# Patient Record
Sex: FEMALE | Race: WHITE | NOT HISPANIC OR LATINO | Employment: OTHER | URBAN - METROPOLITAN AREA
[De-identification: names, ages, dates, MRNs, and addresses within clinical notes are randomized per-mention and may not be internally consistent; named-entity substitution may affect disease eponyms.]

---

## 2017-01-24 ENCOUNTER — TRANSCRIBE ORDERS (OUTPATIENT)
Dept: ADMINISTRATIVE | Facility: HOSPITAL | Age: 67
End: 2017-01-24

## 2017-01-24 ENCOUNTER — APPOINTMENT (OUTPATIENT)
Dept: GENERAL RADIOLOGY | Facility: HOSPITAL | Age: 67
End: 2017-01-24
Attending: FAMILY MEDICINE

## 2017-01-24 DIAGNOSIS — M79.622 LEFT UPPER ARM PAIN: Primary | ICD-10-CM

## 2017-05-13 ENCOUNTER — HOSPITAL ENCOUNTER (OUTPATIENT)
Facility: HOSPITAL | Age: 67
Setting detail: OBSERVATION
Discharge: HOME | End: 2017-05-15
Attending: EMERGENCY MEDICINE | Admitting: HOSPITALIST

## 2017-05-13 DIAGNOSIS — K92.2 ACUTE UPPER GI BLEEDING: Primary | ICD-10-CM

## 2017-05-13 PROBLEM — K21.9 GERD (GASTROESOPHAGEAL REFLUX DISEASE): Status: ACTIVE | Noted: 2017-05-13

## 2017-05-13 PROBLEM — E11.9 DIABETES MELLITUS: Status: ACTIVE | Noted: 2017-05-13

## 2017-05-13 PROBLEM — I95.1 ORTHOSTATIC HYPOTENSION: Status: ACTIVE | Noted: 2017-05-13

## 2017-05-13 LAB
ABO GROUP BLD: NORMAL
ABO GROUP BLD: NORMAL
ALBUMIN SERPL-MCNC: 4.8 G/DL (ref 3.2–4.8)
ALBUMIN/GLOB SERPL: 1.7 G/DL (ref 1.5–2.5)
ALP SERPL-CCNC: 75 U/L (ref 25–100)
ALT SERPL W P-5'-P-CCNC: 24 U/L (ref 7–40)
ANION GAP SERPL CALCULATED.3IONS-SCNC: 9 MMOL/L (ref 3–11)
APTT PPP: 25 SECONDS (ref 24–31)
AST SERPL-CCNC: 31 U/L (ref 0–33)
BASOPHILS # BLD AUTO: 0.01 10*3/MM3 (ref 0–0.2)
BASOPHILS NFR BLD AUTO: 0.1 % (ref 0–1)
BILIRUB SERPL-MCNC: 0.6 MG/DL (ref 0.3–1.2)
BLD GP AB SCN SERPL QL: NEGATIVE
BUN BLD-MCNC: 11 MG/DL (ref 9–23)
BUN/CREAT SERPL: 12.2 (ref 7–25)
CALCIUM SPEC-SCNC: 10.6 MG/DL (ref 8.7–10.4)
CHLORIDE SERPL-SCNC: 101 MMOL/L (ref 99–109)
CO2 SERPL-SCNC: 31 MMOL/L (ref 20–31)
CREAT BLD-MCNC: 0.9 MG/DL (ref 0.6–1.3)
D-LACTATE SERPL-SCNC: 1.4 MMOL/L (ref 0.5–2)
DEPRECATED RDW RBC AUTO: 47 FL (ref 37–54)
DEVELOPER EXPIRATION DATE: NORMAL
DEVELOPER LOT NUMBER: NORMAL
EOSINOPHIL # BLD AUTO: 0.19 10*3/MM3 (ref 0.1–0.3)
EOSINOPHIL NFR BLD AUTO: 2.2 % (ref 0–3)
ERYTHROCYTE [DISTWIDTH] IN BLOOD BY AUTOMATED COUNT: 13.7 % (ref 11.3–14.5)
EXPIRATION DATE: NORMAL
FECAL OCCULT BLOOD SCREEN, POC: NEGATIVE
GASTROCULT GAST QL: POSITIVE
GFR SERPL CREATININE-BSD FRML MDRD: 62 ML/MIN/1.73
GLOBULIN UR ELPH-MCNC: 2.9 GM/DL
GLUCOSE BLD-MCNC: 164 MG/DL (ref 70–100)
GLUCOSE BLDC GLUCOMTR-MCNC: 110 MG/DL (ref 70–130)
GLUCOSE BLDC GLUCOMTR-MCNC: 99 MG/DL (ref 70–130)
HCT VFR BLD AUTO: 43.9 % (ref 34.5–44)
HCT VFR BLD AUTO: 44.4 % (ref 34.5–44)
HGB BLD-MCNC: 13.4 G/DL (ref 11.5–15.5)
HGB BLD-MCNC: 14.1 G/DL (ref 11.5–15.5)
IMM GRANULOCYTES # BLD: 0.03 10*3/MM3 (ref 0–0.03)
IMM GRANULOCYTES NFR BLD: 0.3 % (ref 0–0.6)
INR PPP: 1.01
LYMPHOCYTES # BLD AUTO: 3.18 10*3/MM3 (ref 0.6–4.8)
LYMPHOCYTES NFR BLD AUTO: 36.1 % (ref 24–44)
Lab: NORMAL
MCH RBC QN AUTO: 29.9 PG (ref 27–31)
MCHC RBC AUTO-ENTMCNC: 32.1 G/DL (ref 32–36)
MCV RBC AUTO: 93.2 FL (ref 80–99)
MONOCYTES # BLD AUTO: 0.56 10*3/MM3 (ref 0–1)
MONOCYTES NFR BLD AUTO: 6.4 % (ref 0–12)
NEGATIVE CONTROL: NEGATIVE
NEUTROPHILS # BLD AUTO: 4.84 10*3/MM3 (ref 1.5–8.3)
NEUTROPHILS NFR BLD AUTO: 54.9 % (ref 41–71)
PH GAST: 4 [PH]
PLATELET # BLD AUTO: 183 10*3/MM3 (ref 150–450)
PMV BLD AUTO: 11.1 FL (ref 6–12)
POSITIVE CONTROL: POSITIVE
POTASSIUM BLD-SCNC: 4.2 MMOL/L (ref 3.5–5.5)
PROT SERPL-MCNC: 7.7 G/DL (ref 5.7–8.2)
PROTHROMBIN TIME: 11 SECONDS (ref 9.6–11.5)
RBC # BLD AUTO: 4.71 10*6/MM3 (ref 3.89–5.14)
RH BLD: POSITIVE
RH BLD: POSITIVE
SODIUM BLD-SCNC: 141 MMOL/L (ref 132–146)
WBC NRBC COR # BLD: 8.81 10*3/MM3 (ref 3.5–10.8)

## 2017-05-13 PROCEDURE — 99284 EMERGENCY DEPT VISIT MOD MDM: CPT

## 2017-05-13 PROCEDURE — 99220 PR INITIAL OBSERVATION CARE/DAY 70 MINUTES: CPT | Performed by: HOSPITALIST

## 2017-05-13 PROCEDURE — 85730 THROMBOPLASTIN TIME PARTIAL: CPT | Performed by: EMERGENCY MEDICINE

## 2017-05-13 PROCEDURE — 80053 COMPREHEN METABOLIC PANEL: CPT | Performed by: EMERGENCY MEDICINE

## 2017-05-13 PROCEDURE — 86850 RBC ANTIBODY SCREEN: CPT | Performed by: EMERGENCY MEDICINE

## 2017-05-13 PROCEDURE — 86900 BLOOD TYPING SEROLOGIC ABO: CPT | Performed by: EMERGENCY MEDICINE

## 2017-05-13 PROCEDURE — 85610 PROTHROMBIN TIME: CPT | Performed by: EMERGENCY MEDICINE

## 2017-05-13 PROCEDURE — G0378 HOSPITAL OBSERVATION PER HR: HCPCS

## 2017-05-13 PROCEDURE — 96376 TX/PRO/DX INJ SAME DRUG ADON: CPT

## 2017-05-13 PROCEDURE — 83605 ASSAY OF LACTIC ACID: CPT | Performed by: EMERGENCY MEDICINE

## 2017-05-13 PROCEDURE — 96365 THER/PROPH/DIAG IV INF INIT: CPT

## 2017-05-13 PROCEDURE — 82271 OCCULT BLOOD OTHER SOURCES: CPT | Performed by: EMERGENCY MEDICINE

## 2017-05-13 PROCEDURE — 85014 HEMATOCRIT: CPT | Performed by: HOSPITALIST

## 2017-05-13 PROCEDURE — 85018 HEMOGLOBIN: CPT | Performed by: HOSPITALIST

## 2017-05-13 PROCEDURE — 82962 GLUCOSE BLOOD TEST: CPT

## 2017-05-13 PROCEDURE — 36415 COLL VENOUS BLD VENIPUNCTURE: CPT

## 2017-05-13 PROCEDURE — 86900 BLOOD TYPING SEROLOGIC ABO: CPT

## 2017-05-13 PROCEDURE — 86901 BLOOD TYPING SEROLOGIC RH(D): CPT

## 2017-05-13 PROCEDURE — 96375 TX/PRO/DX INJ NEW DRUG ADDON: CPT

## 2017-05-13 PROCEDURE — 96361 HYDRATE IV INFUSION ADD-ON: CPT

## 2017-05-13 PROCEDURE — 85025 COMPLETE CBC W/AUTO DIFF WBC: CPT | Performed by: EMERGENCY MEDICINE

## 2017-05-13 PROCEDURE — 25010000002 ONDANSETRON PER 1 MG: Performed by: HOSPITALIST

## 2017-05-13 PROCEDURE — 86901 BLOOD TYPING SEROLOGIC RH(D): CPT | Performed by: EMERGENCY MEDICINE

## 2017-05-13 RX ORDER — ACETAMINOPHEN 325 MG/1
650 TABLET ORAL EVERY 4 HOURS PRN
Status: DISCONTINUED | OUTPATIENT
Start: 2017-05-13 | End: 2017-05-15 | Stop reason: HOSPADM

## 2017-05-13 RX ORDER — SODIUM CHLORIDE 0.9 % (FLUSH) 0.9 %
10 SYRINGE (ML) INJECTION AS NEEDED
Status: DISCONTINUED | OUTPATIENT
Start: 2017-05-13 | End: 2017-05-15 | Stop reason: HOSPADM

## 2017-05-13 RX ORDER — ATENOLOL 50 MG/1
50 TABLET ORAL DAILY
COMMUNITY
End: 2019-02-20 | Stop reason: HOSPADM

## 2017-05-13 RX ORDER — SIMVASTATIN 20 MG
20 TABLET ORAL NIGHTLY
COMMUNITY
End: 2019-02-20 | Stop reason: HOSPADM

## 2017-05-13 RX ORDER — SODIUM CHLORIDE 0.9 % (FLUSH) 0.9 %
1-10 SYRINGE (ML) INJECTION AS NEEDED
Status: DISCONTINUED | OUTPATIENT
Start: 2017-05-13 | End: 2017-05-15 | Stop reason: HOSPADM

## 2017-05-13 RX ORDER — NICOTINE POLACRILEX 4 MG
15 LOZENGE BUCCAL
Status: DISCONTINUED | OUTPATIENT
Start: 2017-05-13 | End: 2017-05-15 | Stop reason: HOSPADM

## 2017-05-13 RX ORDER — FLUOXETINE HYDROCHLORIDE 20 MG/1
40 CAPSULE ORAL DAILY
Status: DISCONTINUED | OUTPATIENT
Start: 2017-05-13 | End: 2017-05-15 | Stop reason: HOSPADM

## 2017-05-13 RX ORDER — LOSARTAN POTASSIUM AND HYDROCHLOROTHIAZIDE 25; 100 MG/1; MG/1
1 TABLET ORAL DAILY
COMMUNITY
End: 2019-02-20 | Stop reason: HOSPADM

## 2017-05-13 RX ORDER — ONDANSETRON 4 MG/1
4 TABLET, FILM COATED ORAL EVERY 6 HOURS PRN
Status: DISCONTINUED | OUTPATIENT
Start: 2017-05-13 | End: 2017-05-15 | Stop reason: HOSPADM

## 2017-05-13 RX ORDER — FERROUS SULFATE 325(65) MG
325 TABLET ORAL
Status: DISCONTINUED | OUTPATIENT
Start: 2017-05-14 | End: 2017-05-15 | Stop reason: HOSPADM

## 2017-05-13 RX ORDER — FLUOXETINE HYDROCHLORIDE 20 MG/1
40 CAPSULE ORAL DAILY
COMMUNITY

## 2017-05-13 RX ORDER — GLIMEPIRIDE 1 MG/1
1 TABLET ORAL
COMMUNITY

## 2017-05-13 RX ORDER — ONDANSETRON 2 MG/ML
4 INJECTION INTRAMUSCULAR; INTRAVENOUS EVERY 6 HOURS PRN
Status: DISCONTINUED | OUTPATIENT
Start: 2017-05-13 | End: 2017-05-15 | Stop reason: HOSPADM

## 2017-05-13 RX ORDER — DEXTROSE MONOHYDRATE 25 G/50ML
25 INJECTION, SOLUTION INTRAVENOUS
Status: DISCONTINUED | OUTPATIENT
Start: 2017-05-13 | End: 2017-05-15 | Stop reason: HOSPADM

## 2017-05-13 RX ORDER — ASPIRIN 81 MG/1
81 TABLET ORAL DAILY
Status: ON HOLD | COMMUNITY
End: 2019-02-20

## 2017-05-13 RX ORDER — SODIUM CHLORIDE 9 MG/ML
75 INJECTION, SOLUTION INTRAVENOUS CONTINUOUS
Status: DISCONTINUED | OUTPATIENT
Start: 2017-05-13 | End: 2017-05-15 | Stop reason: HOSPADM

## 2017-05-13 RX ORDER — ATORVASTATIN CALCIUM 10 MG/1
10 TABLET, FILM COATED ORAL DAILY
Status: DISCONTINUED | OUTPATIENT
Start: 2017-05-13 | End: 2017-05-15 | Stop reason: HOSPADM

## 2017-05-13 RX ORDER — PANTOPRAZOLE SODIUM 40 MG/10ML
80 INJECTION, POWDER, LYOPHILIZED, FOR SOLUTION INTRAVENOUS ONCE
Status: COMPLETED | OUTPATIENT
Start: 2017-05-13 | End: 2017-05-13

## 2017-05-13 RX ORDER — MELOXICAM 15 MG/1
15 TABLET ORAL DAILY
COMMUNITY
End: 2017-05-13

## 2017-05-13 RX ORDER — FERROUS SULFATE TAB EC 324 MG (65 MG FE EQUIVALENT) 324 (65 FE) MG
324 TABLET DELAYED RESPONSE ORAL
COMMUNITY

## 2017-05-13 RX ORDER — PANTOPRAZOLE SODIUM 40 MG/1
40 TABLET, DELAYED RELEASE ORAL DAILY
COMMUNITY
End: 2019-02-20 | Stop reason: HOSPADM

## 2017-05-13 RX ORDER — SODIUM CHLORIDE 0.9 % (FLUSH) 0.9 %
10 SYRINGE (ML) INJECTION AS NEEDED
Status: DISCONTINUED | OUTPATIENT
Start: 2017-05-13 | End: 2017-05-13

## 2017-05-13 RX ADMIN — SODIUM CHLORIDE 8 MG/HR: 900 INJECTION INTRAVENOUS at 17:35

## 2017-05-13 RX ADMIN — ACETAMINOPHEN 650 MG: 325 TABLET, FILM COATED ORAL at 21:04

## 2017-05-13 RX ADMIN — PANTOPRAZOLE SODIUM 80 MG: 40 INJECTION, POWDER, FOR SOLUTION INTRAVENOUS at 11:56

## 2017-05-13 RX ADMIN — ONDANSETRON 4 MG: 2 INJECTION INTRAMUSCULAR; INTRAVENOUS at 17:32

## 2017-05-13 RX ADMIN — SODIUM CHLORIDE 1000 ML: 9 INJECTION, SOLUTION INTRAVENOUS at 12:34

## 2017-05-13 RX ADMIN — SODIUM CHLORIDE 75 ML/HR: 9 INJECTION, SOLUTION INTRAVENOUS at 14:58

## 2017-05-13 RX ADMIN — SODIUM CHLORIDE 8 MG/HR: 900 INJECTION INTRAVENOUS at 13:32

## 2017-05-13 RX ADMIN — ATORVASTATIN CALCIUM 10 MG: 10 TABLET, FILM COATED ORAL at 14:58

## 2017-05-14 PROBLEM — I10 HYPERTENSION: Status: ACTIVE | Noted: 2017-05-14

## 2017-05-14 LAB
ANION GAP SERPL CALCULATED.3IONS-SCNC: 5 MMOL/L (ref 3–11)
BUN BLD-MCNC: 11 MG/DL (ref 9–23)
BUN/CREAT SERPL: 13.8 (ref 7–25)
CALCIUM SPEC-SCNC: 9.5 MG/DL (ref 8.7–10.4)
CHLORIDE SERPL-SCNC: 106 MMOL/L (ref 99–109)
CO2 SERPL-SCNC: 28 MMOL/L (ref 20–31)
CREAT BLD-MCNC: 0.8 MG/DL (ref 0.6–1.3)
DEPRECATED RDW RBC AUTO: 49.1 FL (ref 37–54)
ERYTHROCYTE [DISTWIDTH] IN BLOOD BY AUTOMATED COUNT: 14 % (ref 11.3–14.5)
GFR SERPL CREATININE-BSD FRML MDRD: 72 ML/MIN/1.73
GLUCOSE BLD-MCNC: 136 MG/DL (ref 70–100)
GLUCOSE BLDC GLUCOMTR-MCNC: 105 MG/DL (ref 70–130)
GLUCOSE BLDC GLUCOMTR-MCNC: 108 MG/DL (ref 70–130)
GLUCOSE BLDC GLUCOMTR-MCNC: 113 MG/DL (ref 70–130)
GLUCOSE BLDC GLUCOMTR-MCNC: 121 MG/DL (ref 70–130)
GLUCOSE BLDC GLUCOMTR-MCNC: 129 MG/DL (ref 70–130)
GLUCOSE BLDC GLUCOMTR-MCNC: 145 MG/DL (ref 70–130)
HCT VFR BLD AUTO: 37.7 % (ref 34.5–44)
HCT VFR BLD AUTO: 38.8 % (ref 34.5–44)
HGB BLD-MCNC: 11.9 G/DL (ref 11.5–15.5)
HGB BLD-MCNC: 11.9 G/DL (ref 11.5–15.5)
IRON 24H UR-MRATE: 58 MCG/DL (ref 50–175)
IRON SATN MFR SERPL: 18 % (ref 15–50)
MCH RBC QN AUTO: 29.5 PG (ref 27–31)
MCHC RBC AUTO-ENTMCNC: 30.7 G/DL (ref 32–36)
MCV RBC AUTO: 96.3 FL (ref 80–99)
PLATELET # BLD AUTO: 144 10*3/MM3 (ref 150–450)
PMV BLD AUTO: 10.7 FL (ref 6–12)
POTASSIUM BLD-SCNC: 4.1 MMOL/L (ref 3.5–5.5)
RBC # BLD AUTO: 4.03 10*6/MM3 (ref 3.89–5.14)
SODIUM BLD-SCNC: 139 MMOL/L (ref 132–146)
TIBC SERPL-MCNC: 324 MCG/DL (ref 250–450)
WBC NRBC COR # BLD: 5.96 10*3/MM3 (ref 3.5–10.8)

## 2017-05-14 PROCEDURE — G0378 HOSPITAL OBSERVATION PER HR: HCPCS

## 2017-05-14 PROCEDURE — 85014 HEMATOCRIT: CPT | Performed by: HOSPITALIST

## 2017-05-14 PROCEDURE — 85018 HEMOGLOBIN: CPT | Performed by: HOSPITALIST

## 2017-05-14 PROCEDURE — 80048 BASIC METABOLIC PNL TOTAL CA: CPT | Performed by: HOSPITALIST

## 2017-05-14 PROCEDURE — 99225 PR SBSQ OBSERVATION CARE/DAY 25 MINUTES: CPT | Performed by: INTERNAL MEDICINE

## 2017-05-14 PROCEDURE — 83550 IRON BINDING TEST: CPT | Performed by: HOSPITALIST

## 2017-05-14 PROCEDURE — 82962 GLUCOSE BLOOD TEST: CPT

## 2017-05-14 PROCEDURE — 83540 ASSAY OF IRON: CPT | Performed by: HOSPITALIST

## 2017-05-14 PROCEDURE — 85027 COMPLETE CBC AUTOMATED: CPT | Performed by: HOSPITALIST

## 2017-05-14 RX ORDER — ATENOLOL 50 MG/1
50 TABLET ORAL DAILY
Status: DISCONTINUED | OUTPATIENT
Start: 2017-05-14 | End: 2017-05-15 | Stop reason: HOSPADM

## 2017-05-14 RX ADMIN — SODIUM CHLORIDE 8 MG/HR: 900 INJECTION INTRAVENOUS at 06:54

## 2017-05-14 RX ADMIN — SODIUM CHLORIDE 8 MG/HR: 900 INJECTION INTRAVENOUS at 23:22

## 2017-05-14 RX ADMIN — SODIUM CHLORIDE 8 MG/HR: 900 INJECTION INTRAVENOUS at 12:30

## 2017-05-14 RX ADMIN — FLUOXETINE HYDROCHLORIDE 40 MG: 20 CAPSULE ORAL at 08:59

## 2017-05-14 RX ADMIN — ATENOLOL 50 MG: 50 TABLET ORAL at 12:29

## 2017-05-14 RX ADMIN — SODIUM CHLORIDE 8 MG/HR: 900 INJECTION INTRAVENOUS at 01:27

## 2017-05-14 RX ADMIN — SODIUM CHLORIDE 8 MG/HR: 900 INJECTION INTRAVENOUS at 17:09

## 2017-05-14 RX ADMIN — ATORVASTATIN CALCIUM 10 MG: 10 TABLET, FILM COATED ORAL at 08:59

## 2017-05-14 RX ADMIN — FERROUS SULFATE TAB 325 MG (65 MG ELEMENTAL FE) 325 MG: 325 (65 FE) TAB at 08:59

## 2017-05-14 RX ADMIN — Medication 5 MG: at 01:29

## 2017-05-15 ENCOUNTER — ANESTHESIA (OUTPATIENT)
Dept: GASTROENTEROLOGY | Facility: HOSPITAL | Age: 67
End: 2017-05-15

## 2017-05-15 ENCOUNTER — ANESTHESIA EVENT (OUTPATIENT)
Dept: GASTROENTEROLOGY | Facility: HOSPITAL | Age: 67
End: 2017-05-15

## 2017-05-15 VITALS
OXYGEN SATURATION: 95 % | HEIGHT: 66 IN | DIASTOLIC BLOOD PRESSURE: 61 MMHG | BODY MASS INDEX: 37.45 KG/M2 | TEMPERATURE: 98 F | HEART RATE: 53 BPM | SYSTOLIC BLOOD PRESSURE: 150 MMHG | WEIGHT: 233 LBS | RESPIRATION RATE: 20 BRPM

## 2017-05-15 LAB
GLUCOSE BLDC GLUCOMTR-MCNC: 108 MG/DL (ref 70–130)
GLUCOSE BLDC GLUCOMTR-MCNC: 124 MG/DL (ref 70–130)
HCT VFR BLD AUTO: 37.1 % (ref 34.5–44)
HGB BLD-MCNC: 12 G/DL (ref 11.5–15.5)

## 2017-05-15 PROCEDURE — 99217 PR OBSERVATION CARE DISCHARGE MANAGEMENT: CPT | Performed by: NURSE PRACTITIONER

## 2017-05-15 PROCEDURE — 85018 HEMOGLOBIN: CPT | Performed by: INTERNAL MEDICINE

## 2017-05-15 PROCEDURE — G0378 HOSPITAL OBSERVATION PER HR: HCPCS

## 2017-05-15 PROCEDURE — 25010000002 PROPOFOL 10 MG/ML EMULSION: Performed by: NURSE ANESTHETIST, CERTIFIED REGISTERED

## 2017-05-15 PROCEDURE — 82962 GLUCOSE BLOOD TEST: CPT

## 2017-05-15 PROCEDURE — 85014 HEMATOCRIT: CPT | Performed by: INTERNAL MEDICINE

## 2017-05-15 RX ORDER — ONDANSETRON 2 MG/ML
4 INJECTION INTRAMUSCULAR; INTRAVENOUS ONCE AS NEEDED
Status: DISCONTINUED | OUTPATIENT
Start: 2017-05-15 | End: 2017-05-15

## 2017-05-15 RX ORDER — SODIUM CHLORIDE, SODIUM LACTATE, POTASSIUM CHLORIDE, AND CALCIUM CHLORIDE .6; .31; .03; .02 G/100ML; G/100ML; G/100ML; G/100ML
9 INJECTION, SOLUTION INTRAVENOUS CONTINUOUS
Status: DISCONTINUED | OUTPATIENT
Start: 2017-05-15 | End: 2017-05-15 | Stop reason: HOSPADM

## 2017-05-15 RX ORDER — LIDOCAINE HYDROCHLORIDE 10 MG/ML
0.5 INJECTION, SOLUTION EPIDURAL; INFILTRATION; INTRACAUDAL; PERINEURAL ONCE AS NEEDED
Status: CANCELLED | OUTPATIENT
Start: 2017-05-15

## 2017-05-15 RX ORDER — SODIUM CHLORIDE 0.9 % (FLUSH) 0.9 %
1-10 SYRINGE (ML) INJECTION AS NEEDED
Status: CANCELLED | OUTPATIENT
Start: 2017-05-15

## 2017-05-15 RX ORDER — LIDOCAINE HYDROCHLORIDE 10 MG/ML
INJECTION, SOLUTION INFILTRATION; PERINEURAL AS NEEDED
Status: DISCONTINUED | OUTPATIENT
Start: 2017-05-15 | End: 2017-05-15 | Stop reason: SURG

## 2017-05-15 RX ORDER — SODIUM CHLORIDE, SODIUM LACTATE, POTASSIUM CHLORIDE, CALCIUM CHLORIDE 600; 310; 30; 20 MG/100ML; MG/100ML; MG/100ML; MG/100ML
9 INJECTION, SOLUTION INTRAVENOUS CONTINUOUS
Status: CANCELLED | OUTPATIENT
Start: 2017-05-15

## 2017-05-15 RX ORDER — PROPOFOL 10 MG/ML
VIAL (ML) INTRAVENOUS AS NEEDED
Status: DISCONTINUED | OUTPATIENT
Start: 2017-05-15 | End: 2017-05-15 | Stop reason: SURG

## 2017-05-15 RX ADMIN — PROPOFOL 50 MG: 10 INJECTION, EMULSION INTRAVENOUS at 11:38

## 2017-05-15 RX ADMIN — PROPOFOL 30 MG: 10 INJECTION, EMULSION INTRAVENOUS at 11:42

## 2017-05-15 RX ADMIN — PROPOFOL 40 MG: 10 INJECTION, EMULSION INTRAVENOUS at 11:44

## 2017-05-15 RX ADMIN — FERROUS SULFATE TAB 325 MG (65 MG ELEMENTAL FE) 325 MG: 325 (65 FE) TAB at 09:32

## 2017-05-15 RX ADMIN — FLUOXETINE HYDROCHLORIDE 40 MG: 20 CAPSULE ORAL at 09:32

## 2017-05-15 RX ADMIN — SODIUM CHLORIDE 8 MG/HR: 900 INJECTION INTRAVENOUS at 05:25

## 2017-05-15 RX ADMIN — ATORVASTATIN CALCIUM 10 MG: 10 TABLET, FILM COATED ORAL at 09:33

## 2017-05-15 RX ADMIN — PROPOFOL 50 MG: 10 INJECTION, EMULSION INTRAVENOUS at 11:40

## 2017-05-15 RX ADMIN — LIDOCAINE HYDROCHLORIDE 100 MG: 10 INJECTION, SOLUTION INFILTRATION; PERINEURAL at 11:35

## 2017-05-15 RX ADMIN — SODIUM CHLORIDE, POTASSIUM CHLORIDE, SODIUM LACTATE AND CALCIUM CHLORIDE: 600; 310; 30; 20 INJECTION, SOLUTION INTRAVENOUS at 11:29

## 2017-05-15 RX ADMIN — SODIUM CHLORIDE, POTASSIUM CHLORIDE, SODIUM LACTATE AND CALCIUM CHLORIDE 9 ML/HR: 600; 310; 30; 20 INJECTION, SOLUTION INTRAVENOUS at 10:50

## 2017-05-15 RX ADMIN — ATENOLOL 50 MG: 50 TABLET ORAL at 09:33

## 2017-05-15 RX ADMIN — SODIUM CHLORIDE, POTASSIUM CHLORIDE, SODIUM LACTATE AND CALCIUM CHLORIDE 250 ML: 600; 310; 30; 20 INJECTION, SOLUTION INTRAVENOUS at 11:48

## 2017-05-15 RX ADMIN — PROPOFOL 75 MG: 10 INJECTION, EMULSION INTRAVENOUS at 11:36

## 2017-06-01 ENCOUNTER — TRANSCRIBE ORDERS (OUTPATIENT)
Dept: MAMMOGRAPHY | Facility: HOSPITAL | Age: 67
End: 2017-06-01

## 2017-06-01 DIAGNOSIS — Z12.31 VISIT FOR SCREENING MAMMOGRAM: Primary | ICD-10-CM

## 2017-06-20 ENCOUNTER — OUTSIDE FACILITY SERVICE (OUTPATIENT)
Dept: GASTROENTEROLOGY | Facility: CLINIC | Age: 67
End: 2017-06-20

## 2017-06-20 ENCOUNTER — LAB REQUISITION (OUTPATIENT)
Dept: LAB | Facility: HOSPITAL | Age: 67
End: 2017-06-20

## 2017-06-20 DIAGNOSIS — D12.0 BENIGN NEOPLASM OF CECUM: ICD-10-CM

## 2017-06-20 DIAGNOSIS — K92.1 MELENA: ICD-10-CM

## 2017-06-20 DIAGNOSIS — Z12.11 ENCOUNTER FOR SCREENING FOR MALIGNANT NEOPLASM OF COLON: ICD-10-CM

## 2017-06-20 DIAGNOSIS — D12.5 BENIGN NEOPLASM OF SIGMOID COLON: ICD-10-CM

## 2017-06-20 DIAGNOSIS — D12.6 BENIGN NEOPLASM OF COLON: ICD-10-CM

## 2017-06-20 PROCEDURE — 45380 COLONOSCOPY AND BIOPSY: CPT | Performed by: INTERNAL MEDICINE

## 2017-06-20 PROCEDURE — G0500 MOD SEDAT ENDO SERVICE >5YRS: HCPCS | Performed by: INTERNAL MEDICINE

## 2017-06-20 PROCEDURE — 45385 COLONOSCOPY W/LESION REMOVAL: CPT | Performed by: INTERNAL MEDICINE

## 2017-06-20 PROCEDURE — 88305 TISSUE EXAM BY PATHOLOGIST: CPT | Performed by: INTERNAL MEDICINE

## 2017-06-23 ENCOUNTER — TELEPHONE (OUTPATIENT)
Dept: GASTROENTEROLOGY | Facility: CLINIC | Age: 67
End: 2017-06-23

## 2017-06-23 LAB
CYTO UR: NORMAL
LAB AP CASE REPORT: NORMAL
LAB AP CLINICAL INFORMATION: NORMAL
Lab: NORMAL
PATH REPORT.FINAL DX SPEC: NORMAL
PATH REPORT.GROSS SPEC: NORMAL

## 2017-06-30 ENCOUNTER — HOSPITAL ENCOUNTER (OUTPATIENT)
Dept: MAMMOGRAPHY | Facility: HOSPITAL | Age: 67
Discharge: HOME | End: 2017-06-30
Attending: FAMILY MEDICINE | Admitting: FAMILY MEDICINE

## 2017-06-30 DIAGNOSIS — Z12.31 VISIT FOR SCREENING MAMMOGRAM: ICD-10-CM

## 2017-06-30 PROCEDURE — 77063 BREAST TOMOSYNTHESIS BI: CPT | Performed by: RADIOLOGY

## 2017-06-30 PROCEDURE — 77063 BREAST TOMOSYNTHESIS BI: CPT

## 2017-06-30 PROCEDURE — G0202 SCR MAMMO BI INCL CAD: HCPCS | Performed by: RADIOLOGY

## 2017-06-30 PROCEDURE — G0202 SCR MAMMO BI INCL CAD: HCPCS

## 2018-06-26 ENCOUNTER — TRANSCRIBE ORDERS (OUTPATIENT)
Dept: ADMINISTRATIVE | Facility: HOSPITAL | Age: 68
End: 2018-06-26

## 2018-06-26 DIAGNOSIS — Z12.31 VISIT FOR SCREENING MAMMOGRAM: Primary | ICD-10-CM

## 2018-07-06 ENCOUNTER — HOSPITAL ENCOUNTER (OUTPATIENT)
Dept: MAMMOGRAPHY | Facility: HOSPITAL | Age: 68
Discharge: HOME | End: 2018-07-06
Attending: FAMILY MEDICINE | Admitting: FAMILY MEDICINE

## 2018-07-06 DIAGNOSIS — Z12.31 VISIT FOR SCREENING MAMMOGRAM: ICD-10-CM

## 2018-07-06 PROCEDURE — 77063 BREAST TOMOSYNTHESIS BI: CPT | Performed by: RADIOLOGY

## 2018-07-06 PROCEDURE — 77067 SCR MAMMO BI INCL CAD: CPT | Performed by: RADIOLOGY

## 2018-07-06 PROCEDURE — 77067 SCR MAMMO BI INCL CAD: CPT

## 2018-07-06 PROCEDURE — 77063 BREAST TOMOSYNTHESIS BI: CPT

## 2018-07-16 ENCOUNTER — HOSPITAL ENCOUNTER (OUTPATIENT)
Dept: ULTRASOUND IMAGING | Facility: HOSPITAL | Age: 68
Discharge: HOME | End: 2018-07-16

## 2018-07-16 ENCOUNTER — HOSPITAL ENCOUNTER (OUTPATIENT)
Dept: MAMMOGRAPHY | Facility: HOSPITAL | Age: 68
Discharge: HOME | End: 2018-07-16
Admitting: RADIOLOGY

## 2018-07-16 DIAGNOSIS — R92.8 ABNORMAL MAMMOGRAM: ICD-10-CM

## 2018-07-16 PROCEDURE — 77066 DX MAMMO INCL CAD BI: CPT | Performed by: RADIOLOGY

## 2018-07-16 PROCEDURE — 76642 ULTRASOUND BREAST LIMITED: CPT | Performed by: RADIOLOGY

## 2018-07-16 PROCEDURE — 77066 DX MAMMO INCL CAD BI: CPT

## 2018-07-16 PROCEDURE — 76642 ULTRASOUND BREAST LIMITED: CPT

## 2018-07-16 PROCEDURE — G0279 TOMOSYNTHESIS, MAMMO: HCPCS

## 2018-07-16 PROCEDURE — G0279 TOMOSYNTHESIS, MAMMO: HCPCS | Performed by: RADIOLOGY

## 2019-02-14 ENCOUNTER — TRANSCRIBE ORDERS (OUTPATIENT)
Dept: ADMINISTRATIVE | Facility: HOSPITAL | Age: 69
End: 2019-02-14

## 2019-02-14 ENCOUNTER — HOSPITAL ENCOUNTER (OUTPATIENT)
Dept: GENERAL RADIOLOGY | Facility: HOSPITAL | Age: 69
Discharge: HOME | End: 2019-02-14
Admitting: FAMILY MEDICINE

## 2019-02-14 DIAGNOSIS — M25.551 BILATERAL HIP PAIN: Primary | ICD-10-CM

## 2019-02-14 DIAGNOSIS — M25.552 BILATERAL HIP PAIN: ICD-10-CM

## 2019-02-14 DIAGNOSIS — M25.552 BILATERAL HIP PAIN: Primary | ICD-10-CM

## 2019-02-14 DIAGNOSIS — M25.551 BILATERAL HIP PAIN: ICD-10-CM

## 2019-02-14 PROCEDURE — 73521 X-RAY EXAM HIPS BI 2 VIEWS: CPT

## 2019-02-17 ENCOUNTER — APPOINTMENT (OUTPATIENT)
Dept: CT IMAGING | Facility: HOSPITAL | Age: 69
End: 2019-02-17

## 2019-02-17 ENCOUNTER — APPOINTMENT (OUTPATIENT)
Dept: GENERAL RADIOLOGY | Facility: HOSPITAL | Age: 69
End: 2019-02-17

## 2019-02-17 ENCOUNTER — HOSPITAL ENCOUNTER (INPATIENT)
Facility: HOSPITAL | Age: 69
LOS: 3 days | Discharge: HOME | End: 2019-02-20
Attending: EMERGENCY MEDICINE | Admitting: FAMILY MEDICINE

## 2019-02-17 DIAGNOSIS — E11.65 POORLY CONTROLLED DIABETES MELLITUS (HCC): ICD-10-CM

## 2019-02-17 DIAGNOSIS — J96.02 ACUTE RESPIRATORY FAILURE WITH HYPOXIA AND HYPERCAPNIA (HCC): ICD-10-CM

## 2019-02-17 DIAGNOSIS — I10 ESSENTIAL HYPERTENSION: ICD-10-CM

## 2019-02-17 DIAGNOSIS — J96.01 ACUTE RESPIRATORY FAILURE WITH HYPOXIA AND HYPERCAPNIA (HCC): ICD-10-CM

## 2019-02-17 DIAGNOSIS — I21.4 NSTEMI (NON-ST ELEVATED MYOCARDIAL INFARCTION) (HCC): Primary | ICD-10-CM

## 2019-02-17 DIAGNOSIS — J18.9 MULTIFOCAL PNEUMONIA: ICD-10-CM

## 2019-02-17 DIAGNOSIS — I51.81 TAKOTSUBO CARDIOMYOPATHY: ICD-10-CM

## 2019-02-17 LAB
ALBUMIN SERPL-MCNC: 4.89 G/DL (ref 3.2–4.8)
ALBUMIN/GLOB SERPL: 1.6 G/DL (ref 1.5–2.5)
ALP SERPL-CCNC: 89 U/L (ref 25–100)
ALT SERPL W P-5'-P-CCNC: 55 U/L (ref 7–40)
ANION GAP SERPL CALCULATED.3IONS-SCNC: 14 MMOL/L (ref 3–11)
APTT PPP: 30.8 SECONDS (ref 85–120)
ARTERIAL PATENCY WRIST A: ABNORMAL
AST SERPL-CCNC: 92 U/L (ref 0–33)
ATMOSPHERIC PRESS: ABNORMAL MMHG
BASE EXCESS BLDA CALC-SCNC: -4.7 MMOL/L (ref 0–2)
BASOPHILS # BLD AUTO: 0.02 10*3/MM3 (ref 0–0.2)
BASOPHILS NFR BLD AUTO: 0.1 % (ref 0–1)
BDY SITE: ABNORMAL
BILIRUB SERPL-MCNC: 0.5 MG/DL (ref 0.3–1.2)
BILIRUB UR QL STRIP: NEGATIVE
BODY TEMPERATURE: 37 C
BUN BLD-MCNC: 14 MG/DL (ref 9–23)
BUN/CREAT SERPL: 14.3 (ref 7–25)
CALCIUM SPEC-SCNC: 10.4 MG/DL (ref 8.7–10.4)
CHLORIDE SERPL-SCNC: 104 MMOL/L (ref 99–109)
CLARITY UR: CLEAR
CO2 BLDA-SCNC: 23.5 MMOL/L (ref 23–27)
CO2 SERPL-SCNC: 25 MMOL/L (ref 20–31)
COHGB MFR BLD: 1.2 % (ref 0–2)
COLOR UR: YELLOW
CREAT BLD-MCNC: 0.98 MG/DL (ref 0.6–1.3)
DEPRECATED RDW RBC AUTO: 46.7 FL (ref 37–54)
EOSINOPHIL # BLD AUTO: 0.15 10*3/MM3 (ref 0–0.3)
EOSINOPHIL NFR BLD AUTO: 1 % (ref 0–3)
ERYTHROCYTE [DISTWIDTH] IN BLOOD BY AUTOMATED COUNT: 13.7 % (ref 11.3–14.5)
GFR SERPL CREATININE-BSD FRML MDRD: 56 ML/MIN/1.73
GLOBULIN UR ELPH-MCNC: 3.1 GM/DL
GLUCOSE BLD-MCNC: 230 MG/DL (ref 70–100)
GLUCOSE UR STRIP-MCNC: ABNORMAL MG/DL
HCO3 BLDA-SCNC: 22.1 MMOL/L (ref 20–26)
HCT VFR BLD AUTO: 47.2 % (ref 34.5–44)
HCT VFR BLD CALC: 47.3 %
HGB BLD-MCNC: 15.6 G/DL (ref 11.5–15.5)
HGB BLDA-MCNC: 15.4 G/DL (ref 14–18)
HGB UR QL STRIP.AUTO: NEGATIVE
HOLD SPECIMEN: NORMAL
HOLD SPECIMEN: NORMAL
HOROWITZ INDEX BLD+IHG-RTO: 40 %
IMM GRANULOCYTES # BLD AUTO: 0.04 10*3/MM3 (ref 0–0.05)
IMM GRANULOCYTES NFR BLD AUTO: 0.3 % (ref 0–0.6)
INR PPP: 0.98 (ref 0.85–1.16)
KETONES UR QL STRIP: ABNORMAL
LEUKOCYTE ESTERASE UR QL STRIP.AUTO: NEGATIVE
LIPASE SERPL-CCNC: 36 U/L (ref 6–51)
LYMPHOCYTES # BLD AUTO: 3.89 10*3/MM3 (ref 0.6–4.8)
LYMPHOCYTES NFR BLD AUTO: 26.5 % (ref 24–44)
MCH RBC QN AUTO: 31.3 PG (ref 27–31)
MCHC RBC AUTO-ENTMCNC: 33.1 G/DL (ref 32–36)
MCV RBC AUTO: 94.8 FL (ref 80–99)
METHGB BLD QL: 0.8 % (ref 0–1.5)
MODALITY: ABNORMAL
MONOCYTES # BLD AUTO: 0.64 10*3/MM3 (ref 0–1)
MONOCYTES NFR BLD AUTO: 4.4 % (ref 0–12)
NEUTROPHILS # BLD AUTO: 10 10*3/MM3 (ref 1.5–8.3)
NEUTROPHILS NFR BLD AUTO: 68 % (ref 41–71)
NITRITE UR QL STRIP: NEGATIVE
NOTE: ABNORMAL
OXYHGB MFR BLDV: 88.2 % (ref 94–99)
PCO2 BLDA: 46.2 MM HG (ref 35–45)
PCO2 TEMP ADJ BLD: 46.2 MM HG (ref 35–45)
PH BLDA: 7.29 PH UNITS (ref 7.35–7.45)
PH UR STRIP.AUTO: <=5 [PH] (ref 5–8)
PH, TEMP CORRECTED: 7.29 PH UNITS
PLATELET # BLD AUTO: 261 10*3/MM3 (ref 150–450)
PMV BLD AUTO: 12 FL (ref 6–12)
PO2 BLDA: 65.2 MM HG (ref 83–108)
PO2 TEMP ADJ BLD: 65.2 MM HG (ref 83–108)
POTASSIUM BLD-SCNC: 4.5 MMOL/L (ref 3.5–5.5)
PROT SERPL-MCNC: 8 G/DL (ref 5.7–8.2)
PROT UR QL STRIP: NEGATIVE
PROTHROMBIN TIME: 12.5 SECONDS (ref 11.2–14.3)
RBC # BLD AUTO: 4.98 10*6/MM3 (ref 3.89–5.14)
SODIUM BLD-SCNC: 143 MMOL/L (ref 132–146)
SP GR UR STRIP: 1.09 (ref 1–1.03)
TROPONIN I SERPL-MCNC: 0.01 NG/ML (ref 0–0.07)
TROPONIN I SERPL-MCNC: 0.23 NG/ML (ref 0–0.07)
UROBILINOGEN UR QL STRIP: ABNORMAL
VENTILATOR MODE: ABNORMAL
WBC NRBC COR # BLD: 14.7 10*3/MM3 (ref 3.5–10.8)
WHOLE BLOOD HOLD SPECIMEN: NORMAL
WHOLE BLOOD HOLD SPECIMEN: NORMAL

## 2019-02-17 PROCEDURE — 4A023N7 MEASUREMENT OF CARDIAC SAMPLING AND PRESSURE, LEFT HEART, PERCUTANEOUS APPROACH: ICD-10-PCS | Performed by: INTERNAL MEDICINE

## 2019-02-17 PROCEDURE — 94799 UNLISTED PULMONARY SVC/PX: CPT

## 2019-02-17 PROCEDURE — 84484 ASSAY OF TROPONIN QUANT: CPT

## 2019-02-17 PROCEDURE — 85730 THROMBOPLASTIN TIME PARTIAL: CPT | Performed by: PHYSICIAN ASSISTANT

## 2019-02-17 PROCEDURE — 93005 ELECTROCARDIOGRAM TRACING: CPT | Performed by: PHYSICIAN ASSISTANT

## 2019-02-17 PROCEDURE — 25010000002 CEFTRIAXONE PER 250 MG: Performed by: PHYSICIAN ASSISTANT

## 2019-02-17 PROCEDURE — 87147 CULTURE TYPE IMMUNOLOGIC: CPT | Performed by: PHYSICIAN ASSISTANT

## 2019-02-17 PROCEDURE — 25010000002 AZITHROMYCIN: Performed by: PHYSICIAN ASSISTANT

## 2019-02-17 PROCEDURE — 71260 CT THORAX DX C+: CPT

## 2019-02-17 PROCEDURE — 84145 PROCALCITONIN (PCT): CPT | Performed by: PHYSICIAN ASSISTANT

## 2019-02-17 PROCEDURE — 99285 EMERGENCY DEPT VISIT HI MDM: CPT

## 2019-02-17 PROCEDURE — 87186 SC STD MICRODIL/AGAR DIL: CPT | Performed by: PHYSICIAN ASSISTANT

## 2019-02-17 PROCEDURE — 85610 PROTHROMBIN TIME: CPT | Performed by: PHYSICIAN ASSISTANT

## 2019-02-17 PROCEDURE — 25010000002 IOPAMIDOL 61 % SOLUTION: Performed by: EMERGENCY MEDICINE

## 2019-02-17 PROCEDURE — 87076 CULTURE ANAEROBE IDENT EACH: CPT | Performed by: PHYSICIAN ASSISTANT

## 2019-02-17 PROCEDURE — 25010000002 HYDROMORPHONE PER 4 MG: Performed by: EMERGENCY MEDICINE

## 2019-02-17 PROCEDURE — B2111ZZ FLUOROSCOPY OF MULTIPLE CORONARY ARTERIES USING LOW OSMOLAR CONTRAST: ICD-10-PCS | Performed by: INTERNAL MEDICINE

## 2019-02-17 PROCEDURE — 85025 COMPLETE CBC W/AUTO DIFF WBC: CPT | Performed by: EMERGENCY MEDICINE

## 2019-02-17 PROCEDURE — 80053 COMPREHEN METABOLIC PANEL: CPT | Performed by: EMERGENCY MEDICINE

## 2019-02-17 PROCEDURE — 87185 SC STD ENZYME DETCJ PER NZM: CPT | Performed by: PHYSICIAN ASSISTANT

## 2019-02-17 PROCEDURE — 83880 ASSAY OF NATRIURETIC PEPTIDE: CPT | Performed by: INTERNAL MEDICINE

## 2019-02-17 PROCEDURE — 25010000002 HEPARIN (PORCINE) PER 1000 UNITS: Performed by: PHYSICIAN ASSISTANT

## 2019-02-17 PROCEDURE — 93005 ELECTROCARDIOGRAM TRACING: CPT | Performed by: EMERGENCY MEDICINE

## 2019-02-17 PROCEDURE — 71045 X-RAY EXAM CHEST 1 VIEW: CPT

## 2019-02-17 PROCEDURE — 87040 BLOOD CULTURE FOR BACTERIA: CPT | Performed by: PHYSICIAN ASSISTANT

## 2019-02-17 PROCEDURE — 82805 BLOOD GASES W/O2 SATURATION: CPT

## 2019-02-17 PROCEDURE — 81003 URINALYSIS AUTO W/O SCOPE: CPT | Performed by: EMERGENCY MEDICINE

## 2019-02-17 PROCEDURE — 94660 CPAP INITIATION&MGMT: CPT

## 2019-02-17 PROCEDURE — 83605 ASSAY OF LACTIC ACID: CPT | Performed by: PHYSICIAN ASSISTANT

## 2019-02-17 PROCEDURE — 25010000002 PROMETHAZINE PER 50 MG: Performed by: PHYSICIAN ASSISTANT

## 2019-02-17 PROCEDURE — 87077 CULTURE AEROBIC IDENTIFY: CPT | Performed by: PHYSICIAN ASSISTANT

## 2019-02-17 PROCEDURE — 36600 WITHDRAWAL OF ARTERIAL BLOOD: CPT

## 2019-02-17 PROCEDURE — 74177 CT ABD & PELVIS W/CONTRAST: CPT

## 2019-02-17 PROCEDURE — 87150 DNA/RNA AMPLIFIED PROBE: CPT | Performed by: PHYSICIAN ASSISTANT

## 2019-02-17 PROCEDURE — 83690 ASSAY OF LIPASE: CPT | Performed by: EMERGENCY MEDICINE

## 2019-02-17 PROCEDURE — B2151ZZ FLUOROSCOPY OF LEFT HEART USING LOW OSMOLAR CONTRAST: ICD-10-PCS | Performed by: INTERNAL MEDICINE

## 2019-02-17 RX ORDER — HEPARIN SODIUM 10000 [USP'U]/100ML
11 INJECTION, SOLUTION INTRAVENOUS
Status: DISCONTINUED | OUTPATIENT
Start: 2019-02-17 | End: 2019-02-18

## 2019-02-17 RX ORDER — METOPROLOL TARTRATE 50 MG/1
50 TABLET, FILM COATED ORAL 2 TIMES DAILY
COMMUNITY
End: 2019-11-26 | Stop reason: HOSPADM

## 2019-02-17 RX ORDER — HYDROMORPHONE HYDROCHLORIDE 1 MG/ML
0.5 INJECTION, SOLUTION INTRAMUSCULAR; INTRAVENOUS; SUBCUTANEOUS
Status: DISCONTINUED | OUTPATIENT
Start: 2019-02-17 | End: 2019-02-20 | Stop reason: HOSPADM

## 2019-02-17 RX ORDER — HEPARIN SODIUM 1000 [USP'U]/ML
4000 INJECTION, SOLUTION INTRAVENOUS; SUBCUTANEOUS ONCE
Status: COMPLETED | OUTPATIENT
Start: 2019-02-17 | End: 2019-02-17

## 2019-02-17 RX ORDER — CEFTRIAXONE SODIUM 1 G/50ML
1 INJECTION, SOLUTION INTRAVENOUS ONCE
Status: COMPLETED | OUTPATIENT
Start: 2019-02-17 | End: 2019-02-18

## 2019-02-17 RX ORDER — SODIUM CHLORIDE 0.9 % (FLUSH) 0.9 %
10 SYRINGE (ML) INJECTION AS NEEDED
Status: DISCONTINUED | OUTPATIENT
Start: 2019-02-17 | End: 2019-02-20 | Stop reason: HOSPADM

## 2019-02-17 RX ORDER — ASPIRIN 81 MG/1
324 TABLET, CHEWABLE ORAL ONCE
Status: COMPLETED | OUTPATIENT
Start: 2019-02-17 | End: 2019-02-17

## 2019-02-17 RX ORDER — LANSOPRAZOLE 30 MG/1
30 CAPSULE, DELAYED RELEASE ORAL DAILY
COMMUNITY

## 2019-02-17 RX ORDER — PROMETHAZINE HYDROCHLORIDE 25 MG/ML
12.5 INJECTION, SOLUTION INTRAMUSCULAR; INTRAVENOUS ONCE
Status: COMPLETED | OUTPATIENT
Start: 2019-02-17 | End: 2019-02-17

## 2019-02-17 RX ADMIN — IOPAMIDOL 70 ML: 612 INJECTION, SOLUTION INTRAVENOUS at 21:38

## 2019-02-17 RX ADMIN — HEPARIN SODIUM 9 UNITS/KG/HR: 10000 INJECTION, SOLUTION INTRAVENOUS at 22:49

## 2019-02-17 RX ADMIN — AZITHROMYCIN MONOHYDRATE 500 MG: 500 INJECTION, POWDER, LYOPHILIZED, FOR SOLUTION INTRAVENOUS at 23:00

## 2019-02-17 RX ADMIN — HEPARIN SODIUM 4000 UNITS: 1000 INJECTION, SOLUTION INTRAVENOUS; SUBCUTANEOUS at 22:50

## 2019-02-17 RX ADMIN — CEFTRIAXONE SODIUM 1 G: 1 INJECTION, SOLUTION INTRAVENOUS at 23:50

## 2019-02-17 RX ADMIN — SODIUM CHLORIDE 1000 ML: 9 INJECTION, SOLUTION INTRAVENOUS at 19:53

## 2019-02-17 RX ADMIN — IOPAMIDOL 100 ML: 612 INJECTION, SOLUTION INTRAVENOUS at 20:06

## 2019-02-17 RX ADMIN — ASPIRIN 81 MG 324 MG: 81 TABLET ORAL at 22:51

## 2019-02-17 RX ADMIN — PROMETHAZINE HYDROCHLORIDE 12.5 MG: 25 INJECTION INTRAMUSCULAR; INTRAVENOUS at 19:53

## 2019-02-17 RX ADMIN — HYDROMORPHONE HYDROCHLORIDE 0.5 MG: 1 INJECTION, SOLUTION INTRAMUSCULAR; INTRAVENOUS; SUBCUTANEOUS at 19:57

## 2019-02-18 ENCOUNTER — APPOINTMENT (OUTPATIENT)
Dept: CARDIOLOGY | Facility: HOSPITAL | Age: 69
End: 2019-02-18

## 2019-02-18 ENCOUNTER — APPOINTMENT (OUTPATIENT)
Dept: GENERAL RADIOLOGY | Facility: HOSPITAL | Age: 69
End: 2019-02-18

## 2019-02-18 PROBLEM — E78.5 HYPERLIPIDEMIA: Status: ACTIVE | Noted: 2019-02-18

## 2019-02-18 PROBLEM — J18.9 COMMUNITY ACQUIRED PNEUMONIA OF RIGHT LUNG: Status: ACTIVE | Noted: 2019-02-18

## 2019-02-18 PROBLEM — J96.01 ACUTE RESPIRATORY FAILURE WITH HYPOXIA AND HYPERCAPNIA: Status: ACTIVE | Noted: 2019-02-18

## 2019-02-18 PROBLEM — J96.02 ACUTE RESPIRATORY FAILURE WITH HYPOXIA AND HYPERCAPNIA: Status: ACTIVE | Noted: 2019-02-18

## 2019-02-18 LAB
ANION GAP SERPL CALCULATED.3IONS-SCNC: 9 MMOL/L (ref 3–11)
ARTICHOKE IGE QN: 148 MG/DL (ref 0–130)
BACTERIA BLD CULT: ABNORMAL
BASOPHILS # BLD AUTO: 0.01 10*3/MM3 (ref 0–0.2)
BASOPHILS NFR BLD AUTO: 0.1 % (ref 0–1)
BH CV ECHO MEAS - AO MAX PG (FULL): 7.7 MMHG
BH CV ECHO MEAS - AO MAX PG: 10.1 MMHG
BH CV ECHO MEAS - AO MEAN PG (FULL): 4.5 MMHG
BH CV ECHO MEAS - AO MEAN PG: 6 MMHG
BH CV ECHO MEAS - AO ROOT AREA (BSA CORRECTED): 1.3
BH CV ECHO MEAS - AO ROOT AREA: 5.9 CM^2
BH CV ECHO MEAS - AO ROOT DIAM: 2.7 CM
BH CV ECHO MEAS - AO V2 MAX: 158.8 CM/SEC
BH CV ECHO MEAS - AO V2 MEAN: 113.7 CM/SEC
BH CV ECHO MEAS - AO V2 VTI: 27.6 CM
BH CV ECHO MEAS - AVA(I,A): 1.7 CM^2
BH CV ECHO MEAS - AVA(I,D): 1.7 CM^2
BH CV ECHO MEAS - AVA(V,A): 1.6 CM^2
BH CV ECHO MEAS - AVA(V,D): 1.6 CM^2
BH CV ECHO MEAS - BSA(HAYCOCK): 2.3 M^2
BH CV ECHO MEAS - BSA: 2.2 M^2
BH CV ECHO MEAS - BZI_BMI: 39.1 KILOGRAMS/M^2
BH CV ECHO MEAS - BZI_METRIC_HEIGHT: 167.6 CM
BH CV ECHO MEAS - BZI_METRIC_WEIGHT: 109.8 KG
BH CV ECHO MEAS - EDV(CUBED): 175.2 ML
BH CV ECHO MEAS - EDV(TEICH): 153.4 ML
BH CV ECHO MEAS - EF(CUBED): 83.5 %
BH CV ECHO MEAS - EF(TEICH): 75.9 %
BH CV ECHO MEAS - ESV(CUBED): 28.8 ML
BH CV ECHO MEAS - ESV(TEICH): 36.9 ML
BH CV ECHO MEAS - FS: 45.2 %
BH CV ECHO MEAS - IVS/LVPW: 1.1
BH CV ECHO MEAS - IVSD: 1.2 CM
BH CV ECHO MEAS - LA DIMENSION: 4.2 CM
BH CV ECHO MEAS - LA/AO: 1.5
BH CV ECHO MEAS - LAD MAJOR: 5.1 CM
BH CV ECHO MEAS - LAT PEAK E' VEL: 9 CM/SEC
BH CV ECHO MEAS - LV MASS(C)D: 260.9 GRAMS
BH CV ECHO MEAS - LV MASS(C)DI: 120.3 GRAMS/M^2
BH CV ECHO MEAS - LV MAX PG: 2.4 MMHG
BH CV ECHO MEAS - LV MEAN PG: 1.5 MMHG
BH CV ECHO MEAS - LV V1 MAX: 77.3 CM/SEC
BH CV ECHO MEAS - LV V1 MEAN: 56.9 CM/SEC
BH CV ECHO MEAS - LV V1 VTI: 14 CM
BH CV ECHO MEAS - LVIDD: 5.6 CM
BH CV ECHO MEAS - LVIDS: 3.1 CM
BH CV ECHO MEAS - LVOT AREA (M): 3.5 CM^2
BH CV ECHO MEAS - LVOT AREA: 3.3 CM^2
BH CV ECHO MEAS - LVOT DIAM: 2.1 CM
BH CV ECHO MEAS - LVPWD: 1.1 CM
BH CV ECHO MEAS - MED PEAK E' VEL: 4.1 CM/SEC
BH CV ECHO MEAS - PA ACC SLOPE: 1246 CM/SEC^2
BH CV ECHO MEAS - PA ACC TIME: 0.08 SEC
BH CV ECHO MEAS - PA MAX PG: 8.3 MMHG
BH CV ECHO MEAS - PA PR(ACCEL): 42.6 MMHG
BH CV ECHO MEAS - PA V2 MAX: 143.9 CM/SEC
BH CV ECHO MEAS - RAP SYSTOLE: 3 MMHG
BH CV ECHO MEAS - RVSP: 33 MMHG
BH CV ECHO MEAS - SI(AO): 74.7 ML/M^2
BH CV ECHO MEAS - SI(CUBED): 67.5 ML/M^2
BH CV ECHO MEAS - SI(LVOT): 21.4 ML/M^2
BH CV ECHO MEAS - SI(TEICH): 53.7 ML/M^2
BH CV ECHO MEAS - SV(AO): 162.1 ML
BH CV ECHO MEAS - SV(CUBED): 146.4 ML
BH CV ECHO MEAS - SV(LVOT): 46.4 ML
BH CV ECHO MEAS - SV(TEICH): 116.5 ML
BH CV ECHO MEAS - TAPSE (>1.6): 3.3 CM2
BH CV ECHO MEAS - TR MAX PG: 30 MMHG
BH CV ECHO MEAS - TR MAX VEL: 270.1 CM/SEC
BH CV VAS BP RIGHT ARM: NORMAL MMHG
BH CV XLRA - RV BASE: 2.7 CM
BH CV XLRA - RV LENGTH: 7.5 CM
BH CV XLRA - RV MID: 2.8 CM
BH CV XLRA - TDI S': 12.6 CM/SEC
BNP SERPL-MCNC: 83 PG/ML (ref 0–100)
BUN BLD-MCNC: 12 MG/DL (ref 9–23)
BUN/CREAT SERPL: 14.3 (ref 7–25)
CALCIUM SPEC-SCNC: 9.6 MG/DL (ref 8.7–10.4)
CHLORIDE SERPL-SCNC: 106 MMOL/L (ref 99–109)
CHOLEST SERPL-MCNC: 213 MG/DL (ref 0–200)
CO2 SERPL-SCNC: 23 MMOL/L (ref 20–31)
CREAT BLD-MCNC: 0.84 MG/DL (ref 0.6–1.3)
D-LACTATE SERPL-SCNC: 2 MMOL/L (ref 0.5–2)
D-LACTATE SERPL-SCNC: 2.4 MMOL/L (ref 0.5–2)
DEPRECATED RDW RBC AUTO: 46.8 FL (ref 37–54)
EOSINOPHIL # BLD AUTO: 0 10*3/MM3 (ref 0–0.3)
EOSINOPHIL NFR BLD AUTO: 0 % (ref 0–3)
ERYTHROCYTE [DISTWIDTH] IN BLOOD BY AUTOMATED COUNT: 13.5 % (ref 11.3–14.5)
GFR SERPL CREATININE-BSD FRML MDRD: 67 ML/MIN/1.73
GLUCOSE BLD-MCNC: 319 MG/DL (ref 70–100)
GLUCOSE BLDC GLUCOMTR-MCNC: 186 MG/DL (ref 70–130)
GLUCOSE BLDC GLUCOMTR-MCNC: 195 MG/DL (ref 70–130)
GLUCOSE BLDC GLUCOMTR-MCNC: 205 MG/DL (ref 70–130)
GLUCOSE BLDC GLUCOMTR-MCNC: 223 MG/DL (ref 70–130)
GLUCOSE BLDC GLUCOMTR-MCNC: 238 MG/DL (ref 70–130)
GLUCOSE BLDC GLUCOMTR-MCNC: 259 MG/DL (ref 70–130)
GLUCOSE BLDC GLUCOMTR-MCNC: 301 MG/DL (ref 70–130)
HBA1C MFR BLD: 7.7 % (ref 4.8–5.6)
HCT VFR BLD AUTO: 45.5 % (ref 34.5–44)
HDLC SERPL-MCNC: 49 MG/DL (ref 40–60)
HGB BLD-MCNC: 14.9 G/DL (ref 11.5–15.5)
HOLD SPECIMEN: NORMAL
IMM GRANULOCYTES # BLD AUTO: 0.05 10*3/MM3 (ref 0–0.05)
IMM GRANULOCYTES NFR BLD AUTO: 0.3 % (ref 0–0.6)
LEFT ATRIUM VOLUME INDEX: 21.2 ML/M^2
LEFT ATRIUM VOLUME: 46 ML
LYMPHOCYTES # BLD AUTO: 1.88 10*3/MM3 (ref 0.6–4.8)
LYMPHOCYTES NFR BLD AUTO: 11.1 % (ref 24–44)
MAGNESIUM SERPL-MCNC: 1.4 MG/DL (ref 1.3–2.7)
MCH RBC QN AUTO: 30.7 PG (ref 27–31)
MCHC RBC AUTO-ENTMCNC: 32.7 G/DL (ref 32–36)
MCV RBC AUTO: 93.8 FL (ref 80–99)
MONOCYTES # BLD AUTO: 0.88 10*3/MM3 (ref 0–1)
MONOCYTES NFR BLD AUTO: 5.2 % (ref 0–12)
NEUTROPHILS # BLD AUTO: 14.11 10*3/MM3 (ref 1.5–8.3)
NEUTROPHILS NFR BLD AUTO: 83.3 % (ref 41–71)
PLATELET # BLD AUTO: 279 10*3/MM3 (ref 150–450)
PMV BLD AUTO: 11.7 FL (ref 6–12)
POTASSIUM BLD-SCNC: 4.8 MMOL/L (ref 3.5–5.5)
PROCALCITONIN SERPL-MCNC: <0.05 NG/ML
RBC # BLD AUTO: 4.85 10*6/MM3 (ref 3.89–5.14)
SODIUM BLD-SCNC: 138 MMOL/L (ref 132–146)
TRIGL SERPL-MCNC: 176 MG/DL (ref 0–150)
TROPONIN I SERPL-MCNC: 0.76 NG/ML (ref 0–0.07)
TROPONIN I SERPL-MCNC: 2.25 NG/ML
TSH SERPL DL<=0.05 MIU/L-ACNC: 1.8 MIU/ML (ref 0.35–5.35)
UFH PPP CHRO-ACNC: 0.2 IU/ML (ref 0.3–0.7)
UFH PPP CHRO-ACNC: 0.35 IU/ML (ref 0.3–0.7)
UFH PPP CHRO-ACNC: 0.36 IU/ML (ref 0.3–0.7)
WBC NRBC COR # BLD: 16.93 10*3/MM3 (ref 3.5–10.8)

## 2019-02-18 PROCEDURE — 94799 UNLISTED PULMONARY SVC/PX: CPT

## 2019-02-18 PROCEDURE — 99152 MOD SED SAME PHYS/QHP 5/>YRS: CPT | Performed by: INTERNAL MEDICINE

## 2019-02-18 PROCEDURE — 25010000002 FENTANYL CITRATE (PF) 100 MCG/2ML SOLUTION: Performed by: INTERNAL MEDICINE

## 2019-02-18 PROCEDURE — 82962 GLUCOSE BLOOD TEST: CPT

## 2019-02-18 PROCEDURE — 83735 ASSAY OF MAGNESIUM: CPT | Performed by: NURSE PRACTITIONER

## 2019-02-18 PROCEDURE — 25010000002 MIDAZOLAM PER 1 MG: Performed by: INTERNAL MEDICINE

## 2019-02-18 PROCEDURE — 99223 1ST HOSP IP/OBS HIGH 75: CPT | Performed by: INTERNAL MEDICINE

## 2019-02-18 PROCEDURE — 25010000002 MAGNESIUM SULFATE 2 GM/50ML SOLUTION: Performed by: PHYSICIAN ASSISTANT

## 2019-02-18 PROCEDURE — 93005 ELECTROCARDIOGRAM TRACING: CPT | Performed by: INTERNAL MEDICINE

## 2019-02-18 PROCEDURE — 0 IOPAMIDOL PER 1 ML: Performed by: INTERNAL MEDICINE

## 2019-02-18 PROCEDURE — 85520 HEPARIN ASSAY: CPT

## 2019-02-18 PROCEDURE — 99153 MOD SED SAME PHYS/QHP EA: CPT | Performed by: INTERNAL MEDICINE

## 2019-02-18 PROCEDURE — 93306 TTE W/DOPPLER COMPLETE: CPT | Performed by: INTERNAL MEDICINE

## 2019-02-18 PROCEDURE — C1769 GUIDE WIRE: HCPCS | Performed by: INTERNAL MEDICINE

## 2019-02-18 PROCEDURE — 93005 ELECTROCARDIOGRAM TRACING: CPT | Performed by: NURSE PRACTITIONER

## 2019-02-18 PROCEDURE — 93458 L HRT ARTERY/VENTRICLE ANGIO: CPT | Performed by: INTERNAL MEDICINE

## 2019-02-18 PROCEDURE — 63710000001 INSULIN LISPRO (HUMAN) PER 5 UNITS: Performed by: INTERNAL MEDICINE

## 2019-02-18 PROCEDURE — 85520 HEPARIN ASSAY: CPT | Performed by: INTERNAL MEDICINE

## 2019-02-18 PROCEDURE — 87899 AGENT NOS ASSAY W/OPTIC: CPT | Performed by: INTERNAL MEDICINE

## 2019-02-18 PROCEDURE — 93306 TTE W/DOPPLER COMPLETE: CPT

## 2019-02-18 PROCEDURE — 85025 COMPLETE CBC W/AUTO DIFF WBC: CPT | Performed by: NURSE PRACTITIONER

## 2019-02-18 PROCEDURE — 84484 ASSAY OF TROPONIN QUANT: CPT | Performed by: NURSE PRACTITIONER

## 2019-02-18 PROCEDURE — 80048 BASIC METABOLIC PNL TOTAL CA: CPT | Performed by: NURSE PRACTITIONER

## 2019-02-18 PROCEDURE — 83605 ASSAY OF LACTIC ACID: CPT | Performed by: PHYSICIAN ASSISTANT

## 2019-02-18 PROCEDURE — 93010 ELECTROCARDIOGRAM REPORT: CPT | Performed by: INTERNAL MEDICINE

## 2019-02-18 PROCEDURE — 63710000001 INSULIN LISPRO (HUMAN) PER 5 UNITS: Performed by: NURSE PRACTITIONER

## 2019-02-18 PROCEDURE — 86738 MYCOPLASMA ANTIBODY: CPT | Performed by: INTERNAL MEDICINE

## 2019-02-18 PROCEDURE — 71045 X-RAY EXAM CHEST 1 VIEW: CPT

## 2019-02-18 PROCEDURE — 83036 HEMOGLOBIN GLYCOSYLATED A1C: CPT | Performed by: NURSE PRACTITIONER

## 2019-02-18 PROCEDURE — 80061 LIPID PANEL: CPT | Performed by: NURSE PRACTITIONER

## 2019-02-18 PROCEDURE — 84443 ASSAY THYROID STIM HORMONE: CPT | Performed by: NURSE PRACTITIONER

## 2019-02-18 PROCEDURE — 25010000002 SULFUR HEXAFLUORIDE MICROSPH 60.7-25 MG RECONSTITUTED SUSPENSION: Performed by: NURSE PRACTITIONER

## 2019-02-18 PROCEDURE — C1894 INTRO/SHEATH, NON-LASER: HCPCS | Performed by: INTERNAL MEDICINE

## 2019-02-18 RX ORDER — METOPROLOL TARTRATE 50 MG/1
50 TABLET, FILM COATED ORAL EVERY 12 HOURS SCHEDULED
Status: DISCONTINUED | OUTPATIENT
Start: 2019-02-18 | End: 2019-02-20 | Stop reason: HOSPADM

## 2019-02-18 RX ORDER — IPRATROPIUM BROMIDE AND ALBUTEROL SULFATE 2.5; .5 MG/3ML; MG/3ML
3 SOLUTION RESPIRATORY (INHALATION) EVERY 4 HOURS PRN
Status: DISCONTINUED | OUTPATIENT
Start: 2019-02-18 | End: 2019-02-20 | Stop reason: HOSPADM

## 2019-02-18 RX ORDER — SODIUM CHLORIDE 0.9 % (FLUSH) 0.9 %
3-10 SYRINGE (ML) INJECTION AS NEEDED
Status: DISCONTINUED | OUTPATIENT
Start: 2019-02-18 | End: 2019-02-20 | Stop reason: HOSPADM

## 2019-02-18 RX ORDER — NITROGLYCERIN 0.4 MG/1
0.4 TABLET SUBLINGUAL
Status: DISCONTINUED | OUTPATIENT
Start: 2019-02-18 | End: 2019-02-20 | Stop reason: HOSPADM

## 2019-02-18 RX ORDER — FENTANYL CITRATE 50 UG/ML
INJECTION, SOLUTION INTRAMUSCULAR; INTRAVENOUS AS NEEDED
Status: DISCONTINUED | OUTPATIENT
Start: 2019-02-18 | End: 2019-02-18 | Stop reason: HOSPADM

## 2019-02-18 RX ORDER — PANTOPRAZOLE SODIUM 40 MG/1
40 TABLET, DELAYED RELEASE ORAL
Status: DISCONTINUED | OUTPATIENT
Start: 2019-02-18 | End: 2019-02-18 | Stop reason: SDUPTHER

## 2019-02-18 RX ORDER — HYDROCHLOROTHIAZIDE 25 MG/1
25 TABLET ORAL DAILY
Status: DISCONTINUED | OUTPATIENT
Start: 2019-02-19 | End: 2019-02-19

## 2019-02-18 RX ORDER — CEFTRIAXONE SODIUM 1 G/50ML
1 INJECTION, SOLUTION INTRAVENOUS EVERY 24 HOURS
Status: DISCONTINUED | OUTPATIENT
Start: 2019-02-19 | End: 2019-02-20

## 2019-02-18 RX ORDER — ATORVASTATIN CALCIUM 40 MG/1
80 TABLET, FILM COATED ORAL NIGHTLY
Status: DISCONTINUED | OUTPATIENT
Start: 2019-02-18 | End: 2019-02-20 | Stop reason: HOSPADM

## 2019-02-18 RX ORDER — PANTOPRAZOLE SODIUM 40 MG/1
40 TABLET, DELAYED RELEASE ORAL
Status: DISCONTINUED | OUTPATIENT
Start: 2019-02-18 | End: 2019-02-20 | Stop reason: HOSPADM

## 2019-02-18 RX ORDER — MAGNESIUM SULFATE HEPTAHYDRATE 40 MG/ML
2 INJECTION, SOLUTION INTRAVENOUS AS NEEDED
Status: DISCONTINUED | OUTPATIENT
Start: 2019-02-18 | End: 2019-02-20 | Stop reason: HOSPADM

## 2019-02-18 RX ORDER — NICOTINE POLACRILEX 4 MG
15 LOZENGE BUCCAL
Status: DISCONTINUED | OUTPATIENT
Start: 2019-02-18 | End: 2019-02-20 | Stop reason: HOSPADM

## 2019-02-18 RX ORDER — DEXTROSE MONOHYDRATE 25 G/50ML
25 INJECTION, SOLUTION INTRAVENOUS
Status: DISCONTINUED | OUTPATIENT
Start: 2019-02-18 | End: 2019-02-20 | Stop reason: HOSPADM

## 2019-02-18 RX ORDER — SODIUM CHLORIDE 0.9 % (FLUSH) 0.9 %
3 SYRINGE (ML) INJECTION EVERY 12 HOURS SCHEDULED
Status: DISCONTINUED | OUTPATIENT
Start: 2019-02-18 | End: 2019-02-20 | Stop reason: HOSPADM

## 2019-02-18 RX ORDER — MAGNESIUM SULFATE HEPTAHYDRATE 40 MG/ML
4 INJECTION, SOLUTION INTRAVENOUS AS NEEDED
Status: DISCONTINUED | OUTPATIENT
Start: 2019-02-18 | End: 2019-02-20 | Stop reason: HOSPADM

## 2019-02-18 RX ORDER — ASPIRIN 81 MG/1
81 TABLET, CHEWABLE ORAL DAILY
Status: DISCONTINUED | OUTPATIENT
Start: 2019-02-19 | End: 2019-02-20 | Stop reason: HOSPADM

## 2019-02-18 RX ORDER — MIDAZOLAM HYDROCHLORIDE 1 MG/ML
INJECTION INTRAMUSCULAR; INTRAVENOUS AS NEEDED
Status: DISCONTINUED | OUTPATIENT
Start: 2019-02-18 | End: 2019-02-18 | Stop reason: HOSPADM

## 2019-02-18 RX ORDER — CLOPIDOGREL BISULFATE 75 MG/1
300 TABLET ORAL ONCE
Status: COMPLETED | OUTPATIENT
Start: 2019-02-18 | End: 2019-02-18

## 2019-02-18 RX ORDER — LOSARTAN POTASSIUM 50 MG/1
100 TABLET ORAL
Status: DISCONTINUED | OUTPATIENT
Start: 2019-02-19 | End: 2019-02-19

## 2019-02-18 RX ORDER — LIDOCAINE HYDROCHLORIDE 10 MG/ML
INJECTION, SOLUTION EPIDURAL; INFILTRATION; INTRACAUDAL; PERINEURAL AS NEEDED
Status: DISCONTINUED | OUTPATIENT
Start: 2019-02-18 | End: 2019-02-18 | Stop reason: HOSPADM

## 2019-02-18 RX ORDER — SODIUM CHLORIDE 9 MG/ML
75 INJECTION, SOLUTION INTRAVENOUS ONCE
Status: COMPLETED | OUTPATIENT
Start: 2019-02-18 | End: 2019-02-18

## 2019-02-18 RX ORDER — FLUOXETINE HYDROCHLORIDE 20 MG/1
40 CAPSULE ORAL DAILY
Status: DISCONTINUED | OUTPATIENT
Start: 2019-02-18 | End: 2019-02-20 | Stop reason: HOSPADM

## 2019-02-18 RX ORDER — FERROUS SULFATE 325(65) MG
325 TABLET ORAL
Status: DISCONTINUED | OUTPATIENT
Start: 2019-02-18 | End: 2019-02-20 | Stop reason: HOSPADM

## 2019-02-18 RX ORDER — ACETAMINOPHEN 325 MG/1
650 TABLET ORAL EVERY 6 HOURS PRN
Status: DISCONTINUED | OUTPATIENT
Start: 2019-02-18 | End: 2019-02-20 | Stop reason: HOSPADM

## 2019-02-18 RX ADMIN — DOXYCYCLINE 100 MG: 100 INJECTION, POWDER, LYOPHILIZED, FOR SOLUTION INTRAVENOUS at 02:49

## 2019-02-18 RX ADMIN — ACETAMINOPHEN 650 MG: 325 TABLET ORAL at 06:07

## 2019-02-18 RX ADMIN — PANTOPRAZOLE SODIUM 40 MG: 40 TABLET, DELAYED RELEASE ORAL at 06:07

## 2019-02-18 RX ADMIN — SODIUM CHLORIDE 75 ML/HR: 9 INJECTION, SOLUTION INTRAVENOUS at 02:27

## 2019-02-18 RX ADMIN — MAGNESIUM SULFATE HEPTAHYDRATE 2 G: 40 INJECTION, SOLUTION INTRAVENOUS at 18:20

## 2019-02-18 RX ADMIN — SODIUM CHLORIDE, PRESERVATIVE FREE 3 ML: 5 INJECTION INTRAVENOUS at 22:07

## 2019-02-18 RX ADMIN — SULFUR HEXAFLUORIDE 2 ML: KIT at 15:15

## 2019-02-18 RX ADMIN — INSULIN LISPRO 5 UNITS: 100 INJECTION, SOLUTION INTRAVENOUS; SUBCUTANEOUS at 02:48

## 2019-02-18 RX ADMIN — SODIUM CHLORIDE, PRESERVATIVE FREE 3 ML: 5 INJECTION INTRAVENOUS at 02:24

## 2019-02-18 RX ADMIN — INSULIN LISPRO 5 UNITS: 100 INJECTION, SOLUTION INTRAVENOUS; SUBCUTANEOUS at 08:23

## 2019-02-18 RX ADMIN — ACETAMINOPHEN 650 MG: 325 TABLET ORAL at 16:17

## 2019-02-18 RX ADMIN — INSULIN LISPRO 5 UNITS: 100 INJECTION, SOLUTION INTRAVENOUS; SUBCUTANEOUS at 11:44

## 2019-02-18 RX ADMIN — METOPROLOL TARTRATE 50 MG: 50 TABLET ORAL at 21:38

## 2019-02-18 RX ADMIN — Medication 325 MG: at 07:56

## 2019-02-18 RX ADMIN — INSULIN LISPRO 5 UNITS: 100 INJECTION, SOLUTION INTRAVENOUS; SUBCUTANEOUS at 21:38

## 2019-02-18 RX ADMIN — CLOPIDOGREL BISULFATE 300 MG: 75 TABLET, FILM COATED ORAL at 08:23

## 2019-02-18 RX ADMIN — DOXYCYCLINE 100 MG: 100 INJECTION, POWDER, LYOPHILIZED, FOR SOLUTION INTRAVENOUS at 11:44

## 2019-02-18 RX ADMIN — METOPROLOL TARTRATE 50 MG: 50 TABLET ORAL at 07:56

## 2019-02-18 RX ADMIN — FLUOXETINE HYDROCHLORIDE 40 MG: 20 CAPSULE ORAL at 07:55

## 2019-02-18 RX ADMIN — INSULIN LISPRO 3 UNITS: 100 INJECTION, SOLUTION INTRAVENOUS; SUBCUTANEOUS at 16:21

## 2019-02-18 RX ADMIN — MAGNESIUM SULFATE HEPTAHYDRATE 2 G: 40 INJECTION, SOLUTION INTRAVENOUS at 16:18

## 2019-02-18 RX ADMIN — MAGNESIUM SULFATE HEPTAHYDRATE 2 G: 40 INJECTION, SOLUTION INTRAVENOUS at 22:07

## 2019-02-18 RX ADMIN — ATORVASTATIN CALCIUM 80 MG: 40 TABLET, FILM COATED ORAL at 21:38

## 2019-02-18 NOTE — PLAN OF CARE
Problem: Patient Care Overview  Goal: Plan of Care Review  Outcome: Ongoing (interventions implemented as appropriate)   02/18/19 0319   Coping/Psychosocial   Plan of Care Reviewed With patient   Plan of Care Review   Progress no change   OTHER   Outcome Summary Patient arrived from ED this shift. Currently on bipap @ 40. Tolerating IV antibiotics. On heparin gtt. Fall precautions in place. Will continue to monitor.       Problem: Fall Risk (Adult)  Goal: Identify Related Risk Factors and Signs and Symptoms  Outcome: Ongoing (interventions implemented as appropriate)   02/18/19 0319   Fall Risk (Adult)   Related Risk Factors (Fall Risk) inadequate lighting;sleep pattern alteration;environment unfamiliar   Signs and Symptoms (Fall Risk) presence of risk factors       Problem: Cardiac: ACS (Acute Coronary Syndrome) (Adult)  Goal: Signs and Symptoms of Listed Potential Problems Will be Absent, Minimized or Managed (Cardiac: ACS)  Outcome: Ongoing (interventions implemented as appropriate)   02/18/19 0319   Goal/Outcome Evaluation   Problems Assessed (Acute Coronary Syndrome) all   Problems Present (Acute Coronary Syn) situational response       Problem: ARDS (Acute Resp Distress Syndrome) (Adult)  Goal: Signs and Symptoms of Listed Potential Problems Will be Absent, Minimized or Managed (ARDS)  Outcome: Ongoing (interventions implemented as appropriate)   02/18/19 0319   Goal/Outcome Evaluation   Problems Assessed (Acute Respiratory Distress Syndrome) all   Problems Present (ARDS) hypoxia/hypoxemia;situational response

## 2019-02-18 NOTE — PROGRESS NOTES
Please see history and physical dated today for full details.  I've seen and examined the patient.  She is a 68-year-old female with a history of diabetes, morbid obesity, hypertension, hyperlipidemia who presented to the emergency room with complaints of left upper quadrant and left breast discomfort.  Initial troponin was unremarkable.  Chest x-ray showed some opacities and a chest CT was done which showed some confluent opacities suggestive of pneumonia but also worrisome for pulmonary edema and mention of ARDS.  On arrival back from the CT scanner she was hypoxic and lethargic and rebounded with a nonrebreather and brief BiPAP.  Subsequent troponins and continue to rise with most recent being greater than 2.  EKG shows some inferior T-wave changes almost nonspecific lateral changes.  She was given aspirin and started on a heparin drip.  I'm also going to load her with Plavix this morning.  Currently rates chest discomfort that 1-2 out of 10.  Breathing has improved and going to wean her down to nasal cannula.  White blood cell count was 16,000 on admission and she does describe some chills at home.  We will continue empiric antibiotics for community-acquired pneumonia.  Unclear how much of the appearance of the CAT scan of her chest is related to edema and she may benefit from some diuretics.  Cardiology has been consult did and she'll be kept nothing by mouth in case of possible left heart catheterization today.  Currently glucoses have been greater than 300 and I will adjust her insulin better control of that.  Plan of care discussed with patient and all questions answered.    Electronically signed by Adin Kaplan MD, 02/18/19, 7:53 AM.

## 2019-02-18 NOTE — H&P
[facility] Medicine Services  HISTORY AND PHYSICAL    Patient Name: Micaela Mc  : 1950  MRN: [id number]  Primary Care Physician: Katya Howard MD  Date of admission: 2019      Subjective   Subjective     Chief Complaint:  Abdominal pain, nausea     HPI:  Micaela Mc is a 68 y.o. female with PMH significant for anemia, GERD, DM2, HTn, and HLD that presents to the ED with complaint of acute onset abdominal pain and nausea with diaphoresis.   She states that she went to [employer] this morning and was not feeling well when she came home. She notes that she abdominal pain in her LUQ and lower chest. She reports having a loose stool, but no vomiting although she reports she felt she needed to vomiting. She did have associated chills and diaphoresis. She denies any fever or cough.   Upon arrival to the ED she was stable. CT chest was obtained secondary to concerning areas found on CXR. Upon return from CT, she was found to be pale, diaphoretic, and semi-conscious with oxygen saturation around 75% on room air which rebounded on 100% non-rebreather. ABG was obtained and found to have hypoxia and hypercapnia and pt was ultimately placed on BiPAP.  CT chest was concerning for multifocal pneumonia. She is also found to have concern for NSTEMI.   She will be admitted to Hospital Medicine for further evaluation.     Review of Systems   Constitutional: Positive for appetite change, chills, diaphoresis and fatigue. Negative for activity change and fever.   HENT: Negative.    Eyes: Negative for visual disturbance.   Respiratory: Positive for shortness of breath. Negative for cough, chest tightness and wheezing.    Cardiovascular: Positive for chest pain. Negative for palpitations and leg swelling.   Gastrointestinal: Positive for abdominal pain, diarrhea and nausea. Negative for constipation and vomiting.   Genitourinary: Negative for difficulty urinating, dysuria, frequency and  urgency.   Musculoskeletal: Negative for arthralgias and myalgias.   Skin: Negative for color change, pallor and rash.   Neurological: Negative for dizziness, weakness and headaches.   Psychiatric/Behavioral: Negative for confusion. The patient is not nervous/anxious.         Otherwise complete ROS reviewed and is negative except as mentioned in the HPI.    Personal History     Past Medical History:   Diagnosis Date   • Anemia    • Diabetes mellitus (CMS/HCC)    • GERD (gastroesophageal reflux disease)    • Hyperlipidemia    • Hypertension        Past Surgical History:   Procedure Laterality Date   • APPENDECTOMY     • BREAST CYST ASPIRATION  1999    pt doesn't remember which breast   • ENDOSCOPY N/A 5/15/2017    Procedure: ESOPHAGOGASTRODUODENOSCOPY;  Surgeon: Jennifer Ingram MD;  Location: [facility] ENDOSCOPY;  Service:    • HYSTERECTOMY      age 41   • LAPAROSCOPIC GASTRIC BANDING         Family History: family history is not on file. Otherwise pertinent FHx was reviewed and unremarkable.     Social History:  reports that  has never smoked. she has never used smokeless tobacco. She reports that she drinks alcohol. She reports that she does not use drugs.  Social History     Social History Narrative   • Not on file       Medications:    Available home medication information reviewed.    (Not in a hospital admission)    Allergies   Allergen Reactions   • Codeine Itching       Objective   Objective     Vital Signs:   Temp:  [97.5 °F (36.4 °C)] 97.5 °F (36.4 °C)  Heart Rate:  [72-88] 88  Resp:  [16-24] 21  BP: ()/() 158/91  FiO2 (%):  [40 %] 40 %        Physical Exam   Constitutional: She is oriented to person, place, and time. She appears well-developed and well-nourished. No distress.   HENT:   Head: Normocephalic.   Eyes: Pupils are equal, round, and reactive to light.   Neck: Normal range of motion. Neck supple. No JVD present.   Cardiovascular: Normal rate, regular rhythm, normal heart sounds and intact  distal pulses. Exam reveals no gallop and no friction rub.   No murmur heard.  Pulmonary/Chest: Effort normal. She has no wheezes. She has no rales.   BiPAP in place during exam. Coarse lung sounds throughout    Abdominal: Soft. Bowel sounds are normal. She exhibits no distension and no mass. There is no tenderness. There is no guarding.   Musculoskeletal: Normal range of motion. She exhibits no edema or tenderness.   Neurological: She is alert and oriented to person, place, and time.   Skin: Skin is warm and dry. No erythema. No pallor.   Psychiatric: She has a normal mood and affect. Her behavior is normal. Thought content normal.   Vitals reviewed.       Results Reviewed:  I have personally reviewed current lab, radiology, and data and agree.    Results from last 7 days   Lab Units 02/17/19  1859   WBC 10*3/mm3 14.70*   HEMOGLOBIN g/dL 15.6*   HEMATOCRIT % 47.2*   PLATELETS 10*3/mm3 261   INR  0.98     Results from last 7 days   Lab Units 02/17/19  2355  02/17/19  1859   SODIUM mmol/L  --   --  143   POTASSIUM mmol/L  --   --  4.5   CHLORIDE mmol/L  --   --  104   CO2 mmol/L  --   --  25.0   BUN mg/dL  --   --  14   CREATININE mg/dL  --   --  0.98   GLUCOSE mg/dL  --   --  230*   CALCIUM mg/dL  --   --  10.4   ALT (SGPT) U/L  --   --  55*   AST (SGOT) U/L  --   --  92*   TROPONIN I ng/mL 0.76*   < >  --     < > = values in this interval not displayed.     Estimated Creatinine Clearance: 66.9 mL/min (by C-G formula based on SCr of 0.98 mg/dL).  Brief Urine Lab Results  (Last result in the past 365 days)      Color   Clarity   Blood   Leuk Est   Nitrite   Protein   CREAT   Urine HCG        02/17/19 2254 Yellow Clear Negative Negative Negative Negative             No results found for: BNP  Imaging Results (last 24 hours)     Procedure Component Value Units Date/Time    CT Chest With Contrast [[id number]] Collected:  02/17/19 2129     Updated:  02/17/19 2258    Narrative:       EXAM:    CT Chest With Contrast      EXAM DATE/TIME:    2/17/2019 9:29 PM     CLINICAL HISTORY:    68 years old, female; Abnormal findings; Abnormal radiologic exam of lung or   chest; Patient HX: Abnormal CT abdomen this admit; Rll lung; Additional info:   Rul / rll mass / infiltrate     TECHNIQUE:    Axial computed tomography images of the chest with intravenous contrast.    All CT scans at this facility use at least one of these dose optimization   techniques: automated exposure control; mA and/or kV adjustment per patient   size (includes targeted exams where dose is matched to clinical indication); or   iterative reconstruction.    Coronal and sagittal reformatted images were created and reviewed.     CONTRAST:    70 ml of isovue 300 administered intravenously.     COMPARISON:    CR XR CHEST 1 VW 2/17/2019 7:11 PM     FINDINGS:    Lungs:  Diffuse interstitial thickening, bilaterally. Focal confluent   consolidation in the peripheral aspect of the right upper lobe measuring   approximately 8.5 x 3.4 x 4.4 cm (series 2 image 13 and series 4 image 38).   Redemonstration of the focal opacity in the right lower lobe measuring 3.2 x   2.4 x 3.2 cm (series 235). There are subtle groundglass opacities throughout   both lungs.    Pleural space: Normal. No pneumothorax. No pleural effusion.    Heart:  Negative for right heart strain. Mild cardiomegaly. No pericardial   effusion.    Aorta:  Aorta and arteries have atherosclerotic calcification. No aortic   aneurysm.    Lymph nodes: Unremarkable. No enlarged lymph nodes.    Bones/joints:  Multilevel degenerative spondylosis of the spine. No acute   fracture or dislocation.    Soft tissues: Unremarkable.    Gallbladder and bile ducts:  Redemonstration of a gallstone in the neck with   subtle adjacent fat stranding.    Stomach and bowel:  Status post gastric banding.       Impression:       1. Redemonstration of the focal confluent regions of consolidation in the   peripheral aspect of the right upper lobe  (corresponding to the radiographs)   and in the right lower lobe as described. Additional diffuse scattered regions   of groundglass opacities and diffuse interstitial thickening, bilaterally.     Although these findings are nonspecific, consider multilobar pneumonia with   considerations for both typical and atypical etiologies. Please correlate   clinically to exclude the possibility of early acute respiratory distress   syndrome (ARDS). Followup CT of the chest in 3 months is advised to document   resolution and exclude other less likely etiologies.     2. Negative for pulmonary embolism.     THIS DOCUMENT HAS BEEN ELECTRONICALLY SIGNED BY XU BAHENA MD    CT Abdomen Pelvis With Contrast [[id number]] Collected:  02/17/19 1957     Updated:  02/17/19 2128    Narrative:       EXAM:    CT Abdomen and Pelvis With Contrast     EXAM DATE/TIME:    2/17/2019 7:57 PM     CLINICAL HISTORY:    68 years old, female; Pain; Abdominal pain; Additional info: Llq abd pain     TECHNIQUE:    Axial computed tomography images of the abdomen and pelvis with intravenous   contrast.    All CT scans at this facility use at least one of these dose optimization   techniques: automated exposure control; mA and/or kV adjustment per patient   size (includes targeted exams where dose is matched to clinical indication); or   iterative reconstruction.    Coronal reformatted images were created and reviewed.     CONTRAST:    100 ml of  administered intravenously.     COMPARISON:    CT ABD PELVIS WO CONTRAST 3/16/2016 2:18 PM     FINDINGS:    Lower thorax:  Focal groundglass opacity abutting the pleura in the right   lower lobe measures 3 x 2 cm (series 2 image 3). Subtle groundglass opacities   throughout the lung bases, bilaterally. Diffuse interstitial thickening.     ABDOMEN:    Liver: Normal. No mass.    Gallbladder and bile ducts:  There is a gallstone in the gallbladder neck   measuring up to 1.4 cm (series 2 image 25). There are  adjacent subtle   inflammatory changes in the gallbladder neck region. No biliary dilatation.    Pancreas: Normal. No ductal dilation.    Spleen: Normal. No splenomegaly.    Adrenals: Normal. No mass.    Kidneys and ureters: Normal. No hydronephrosis.    Stomach and bowel:  Status post gastric banding. No abnormal dilatation of the   proximal esophagus to the extent imaged. Scattered colonic diverticula without   inflammatory changes to suggest acute diverticulitis. No evidence of intestinal   perforation or obstruction.    Appendix:  Status post appendectomy.     PELVIS:    Bladder:  No calcified stones in the incompletely distended urinary bladder.    Reproductive:  Status post hysterectomy. No adnexal cysts or masses are   identified.     ABDOMEN and PELVIS:    Intraperitoneal space: Normal. No free air. No significant fluid collection.    Bones/joints:  Multilevel degenerative spondylosis of the spine. No acute   fracture or dislocation.    Soft tissues:  Small fat containing umbilical hernia.    Vasculature:  Aorta and arteries have atherosclerotic calcification. No   abdominal aortic aneurysm.    Lymph nodes: Normal. No enlarged lymph nodes.       Impression:       1. Gallstone in the gallbladder neck with subtle djacent inflammatory changes.   Consider further assessment with a right quadrant ultrasound as clinically   warranted if there is concern for acute cholecystitis.   2. Status post gastric banding with no evident complication.   3. Nonspecific focal ground glass opacity abutting the pleura in the right   lower lobe (3 x 2 cm). Although nonspecific, consider pulmonary infection, to   include atypical etiologies, such as fungal or mycobacterium. Malignancy is a   differential consideration.     Further assessment with a CT of the chest is advised for further assessment of   the right upper lung opacity seen on the chest radiograph from the same day.     4. Additionally, there are subtle groundglass  opacities of both lung bases and   interstitial thickening, nonspecific findings.   5. Colonic diverticulosis without diverticulitis.   6. Additional chronic findings as described.     THIS DOCUMENT HAS BEEN ELECTRONICALLY SIGNED BY XU BAHENA MD    XR Chest 1 View [[id number]] Collected:  19     Updated:  19    Narrative:       EXAM:    XR Chest, 1 View     EXAM DATE/TIME:    2019 7:01 PM     CLINICAL HISTORY:    68 years old, female; Pain; Other: See notes; Additional info: Upper abdominal   pain triage protocol     TECHNIQUE:    XR of the chest, 1 view.     COMPARISON:    No relevant prior studies available.     FINDINGS:    Lungs:  There is right lower lobe opacity at the level of the diaphragm which   is seen greater advantage on the comparison CT. There is an additional focal   opacity the peripheral aspect of the right upper lung located at the level of   the peripheral third-fifth ribs abutting the pleura, a nonspecific finding.    Pleural space:  No pleural effusion or pneumothorax.    Heart/Mediastinum:  There is mild cardiomegaly and pericardial lipomatosis as   seen on the CT from the same date.    Bones/joints: Unremarkable.       Impression:       Nonspecific right lower lobe opacity at the level of the diaphragm which is   seen to greater advantage of comparison CT and additional focal opacity at the   peripheral aspect of the right upper lung as described. Although nonspecific   findings, consider pulmonary infection or malignancy. Further assessment with a   CT of the chest is advised.     THIS DOCUMENT HAS BEEN ELECTRONICALLY SIGNED BY XU BAHENA MD        Results for orders placed during the hospital encounter of 01/21/15   CONVERTED (HISTORICAL) ECHO    Narrative Patient:      GLORIA GILLIAM    [facility] Rec#:     [id number]               :          1950            Date:         2015            Age:          64y                   Height:       167.64 cm /  66.0 in  Weight:       107.05 kg / 235.9 lbs  Sex:          F                     BSA:          2.15  Room#:        op                        Sonographer:  Gina Saldana BS,RDCS,RVS  Referring:    CORRINE  Reading:      Chase Iverson MD  Primary:      HENMELISSAMARYH  ______________________________________________________________________    Transthoracic Echocardiogram    Indication:  HTN  BP:           136/64  HR:           57    Conclusions  1. The estimated ejection fraction is 55-60%.   2. All valves are structurally and functionally normal with no  hemodynamically significant valve disease.     Findings       Technical Comments:  The study quality is fair.      Left Ventricle:  The left ventricular chamber size is normal. The estimated ejection  fraction is 55-60%.      Left Atrium:  The left atrium is mildly dilated.     Right Ventricle:  The right ventricle is mildly dilated.  The right ventricular global  systolic function is normal.     Right Atrium:  The right atrial cavity size is normal. No atrial septal defect is  visualized.     Aortic Valve:  The aortic valve is trileaflet. Mild aortic leaflet calcification is  visualized. There is no evidence of aortic regurgitation. There is no  evidence of aortic stenosis.     Mitral Valve:  The mitral valve leaflets appear normal. There is no evidence of mitral  valve prolapse. There is mild mitral regurgitation.  There is no  evidence of mitral stenosis.     Tricuspid Valve:  The tricuspid valve leaflets are normal.  There is mild tricuspid  regurgitation.     Pulmonic Valve:  The pulmonic valve appears normal. There is a trace pulmonic  regurgitation.      Pericardium:  There is no evidence of pericardial effusion.     Aorta:  There is no dilatation of the aortic root.     Pulmonary Artery:  The main pulmonary artery appears normal.     Venous:  The venous system appears normal.     Measurements   Chambers  Name                    Value           RVIDd (AP)  2D           2.92 cm         IVSd (2D)               0.92 cm         LVIDd (2D)              5.17 cm         LVIDs (2D)              3.35 cm         LVPWd (2D)              1 cm            Ao root diameter (2D)   2.6 cm          LA dimension (AP) 2D    4.7 cm          LA:Ao ratio (2D)        1.81 ratio        Diastolic/Systolic Function  Name                    Value           MV E-wave Vmax          0.85 m/sec      MV A-wave Vmax          0.91 m/sec      MV E:A ratio            0.9 ratio       LV lateral e' Vmax      0.05 m/sec      LV E:e' lateral ratio   16.5 ratio        Aortic Valve  Name                    Value           AV Vmax                 1.8 m/sec       AV VTI                  35.9 cm         AV peak gradient        13 mmHg         AV mean gradient        6 mmHg          LVOT Vmax               0.89 m/sec      LVOT VTI                21.7 cm         LVOT peak gradient      3 mmHg          LVOT mean gradient      2 mmHg            Pulmonic Valve/Qp:Qs  Name                    Value           PV Vmax                 1.18 m/sec      PV peak gradient        6 mmHg          PV acceleration time    127 msec          Electronically signed by: Chase Iverson MD on 01/22/2015 17:42:25       Assessment/Plan   Assessment / Plan     Active Hospital Problems    Diagnosis Date Noted   • **Acute respiratory failure with hypoxia and hypercapnia (CMS/HCC) [J96.01, J96.02] 02/18/2019   • Hyperlipidemia [E78.5] 02/18/2019   • Community acquired pneumonia of right lung [J18.9] 02/18/2019   • NSTEMI (non-ST elevated myocardial infarction) (CMS/HCC) [I21.4] 02/17/2019   • Hypertension [I10] 05/14/2017   • GERD (gastroesophageal reflux disease) [K21.9] 05/13/2017   • Type 2 diabetes mellitus (CMS/HCC) [E11.9] 05/13/2017     68 year old female presenting to the ED with atypical presentation of LUQ abdominal pain/ lower chest pain who is found to have NSTEMI as well as CT concerning for right lobe pneumonia     Acute  Respiratory Failure/ Right Lobe PNA  -BiPAP in place for now, wean as appropriate  -Rocephin and Azithromycin started in ED, will continue Rocephin and change to doxycycline   -PNA antigens  -PRN nebs   -Lactic acid, procal  pending   -CBC, BMP in am     NSTEMI  -Heparin gtt started in ED  -trend troponin   -Trend EKG  -BNP pending   -ASA load given in ED, will continue daily   -TSH, Lipid panel, CBC, BMP in am     Diabetes Mellitus 2  -FSBG ACHS  -SS insulin   -HgA1c in am     GERD  -continue home medication    Hypertension   -continue home medication       DVT prophylaxis:  Heparin gtt     CODE STATUS:    There are no questions and answers to display.       Admission Status:  I believe this patient meets INPATIENT status due to the need for care which can only be reasonably provided in an hospital setting such as possible need for aggressive/expedited ancillary services and/or consultation services, IV medications, close physician monitoring, and/or procedures.  In such, I feel patient’s risk for adverse outcomes and need for care warrant INPATIENT evaluation and predict the patient’s care encounter to likely last beyond 2 midnights.    Electronically signed by DIONNE Hawk, 02/18/2019    Brief Attending Admission Attestation     I have seen and examined the patient, performing an independent face-to-face diagnostic evaluation with plan of care reviewed and developed with the advanced practice clinician (APC).      Brief Summary Statement:   Micaela Mc is a 68 y.o. female with history of type 2 diabetes mellitus, morbid obesity, hypertension, obstructive sleep apnea but noncompliant with CPAP at home.  Patient presents to the ER with complaint of left upper abdominal pain/chest discomfort.  Her symptoms started several hours ago, associated with nausea but no emesis.  In addition, patient reports some chills and diaphoresis.  She denies prior history of coronary artery disease.  No vomiting,  dysuria, urinary frequency, dizziness or syncope.  Patient noted to be hypoxic in the ER.  She was started on BiPAP and ABG showed pH of 7.28 with PCO2 of 46.2 and PO2 of 65.2.  Troponin is 0.23 but EKG shows normal ST elevation.  Chest CT with IV contrast reports some focal confluent regions of consolidation in the peripheral aspect of the right upper lobe and in the right lower lobe.  There are diffuse scattered regions of ground glass opacities and diffuse interstitial thickening bilaterally.  Findings are consistent with possible multilobar pneumonia.  Patient was started on IV antibiotics for community-acquired pneumonia.  We'll continue Rocephin and IV doxycycline.  We'll check mycoplasma antibodies as well as Legionella urine antigens.  Patient is currently on BiPAP and will hold off as symptoms improve.  Remainder of detailed HPI is as noted above and has been reviewed and/or edited by me for completeness.      Attending Physical Exam:  Constitutional: No acute distress, awake, alert  Eyes: PERRLA, sclerae anicteric, no conjunctival injection  HENT: NCAT, mucous membranes moist  Neck: Supple, no thyromegaly, no lymphadenopathy, trachea midline  Respiratory: Clear to auscultation bilaterally, nonlabored respirations   Cardiovascular: RRR, no murmurs, rubs, or gallops, palpable pedal pulses bilaterally  Gastrointestinal: Positive bowel sounds, soft, nontender, nondistended  Musculoskeletal: No bilateral ankle edema, no clubbing or cyanosis to extremities  Psychiatric: Appropriate affect, cooperative  Neurologic: Oriented x 3, strength symmetric in all extremities, Cranial Nerves grossly intact, speech clear  Skin: No rashes      Brief Assessment/Plan :  See above for further detailed assessment and plan developed with APC which I have reviewed and/or edited for completeness.      Electronically signed by Clifford Llanes MD, 02/18/19, 12:20 AM.

## 2019-02-18 NOTE — PROGRESS NOTES
Order received for Phase II Cardiac Rehab. Staff will follow up once patient is assessed by cardiology.

## 2019-02-18 NOTE — NURSING NOTE
Patient refusing to wear biPap.  States it does not help and it makes her sweaty.  Primary RN and charge RN reiterated importance of wearing biPap as ordered.  Respiratory therapy placed patient on non-rebreather.  Patient agrees to wear this.  Will address with hospitalist on call.

## 2019-02-18 NOTE — ED PROVIDER NOTES
Subjective   68-year-old female presents to emergency department complaining of nausea, diaphoresis, crampy upper abdominal pain, some left lower quadrant abdominal pain, and some loose stools.  No chest arm neck jaw shoulder pain or shortness of breath.  No sputum or hemoptysis.  No hematochezia or melena.  No hematemesis or emesis.  She has had some chills and sweats.  She denies flank pain dysuria hematuria or pyuria.  She states she took a dose of Levsin due to her nausea, and this is helped somewhat with her symptoms.  She has a history of hypertension, hyperlipidemia, non-insulin-dependent diabetes.  Her primary care provider is Dr. Katya Howard.  Bon has no prior history of heart disease otherwise, no history of MI or angina.  He does have a history of upper GI bleed in May 2017, takes Prevacid daily.  Medication list includes atenolol 50 mg daily, ferrous sulfate, calcium carbonate, Prozac, Amaryl, Levsin, Prevacid, metformin, Lopressor, 81 mg aspirin daily losartan hydrochlorothiazide 100/25, Protonix 40 mg, Zocor 20 mg.  She is allergic codeine.        History provided by:  Patient  Illness   Location:  Per HPI  Quality:  Per HPI  Severity:  Moderate  Onset quality:  Gradual  Duration:  1 day  Timing:  Intermittent  Progression:  Waxing and waning  Chronicity:  New  Context:  Per HPI  Relieved by:  Per HPI  Worsened by:  Per HPI  Ineffective treatments:  None  Associated symptoms: diarrhea, fatigue and nausea    Associated symptoms: no abdominal pain, no chest pain, no congestion, no cough, no fever, no headaches, no myalgias, no rash, no rhinorrhea, no shortness of breath, no sore throat, no vomiting and no wheezing        Review of Systems   Constitutional: Positive for activity change, appetite change, chills, diaphoresis and fatigue. Negative for fever.   HENT: Negative for congestion, rhinorrhea and sore throat.    Respiratory: Negative for cough, chest tightness, shortness of breath and wheezing.     Cardiovascular: Negative for chest pain, palpitations and leg swelling.   Gastrointestinal: Positive for diarrhea and nausea. Negative for abdominal pain and vomiting.   Musculoskeletal: Negative for myalgias.   Skin: Negative for rash.   Neurological: Negative for headaches.   All other systems reviewed and are negative.      Past Medical History:   Diagnosis Date   • Anemia    • Diabetes mellitus (CMS/HCC)    • GERD (gastroesophageal reflux disease)    • Hyperlipidemia    • Hypertension        Allergies   Allergen Reactions   • Codeine Itching       Past Surgical History:   Procedure Laterality Date   • APPENDECTOMY     • BREAST CYST ASPIRATION  1999    pt doesn't remember which breast   • ENDOSCOPY N/A 5/15/2017    Procedure: ESOPHAGOGASTRODUODENOSCOPY;  Surgeon: Jennifer Ingram MD;  Location: [facility] ENDOSCOPY;  Service:    • HYSTERECTOMY      age 41   • LAPAROSCOPIC GASTRIC BANDING         Family History   Problem Relation Age of Onset   • Breast cancer Neg Hx    • Colon cancer Neg Hx    • Endometrial cancer Neg Hx    • Ovarian cancer Neg Hx        Social History     Socioeconomic History   • Marital status:      Spouse name: Not on file   • Number of children: Not on file   • Years of education: Not on file   • Highest education level: Not on file   Tobacco Use   • Smoking status: Never Smoker   • Smokeless tobacco: Never Used   Substance and Sexual Activity   • Alcohol use: Yes     Comment: OCCASIONALLY    • Drug use: No   • Sexual activity: Defer           Objective   Physical Exam   Constitutional: She is oriented to person, place, and time. She appears well-developed and well-nourished.  Non-toxic appearance. She appears ill. No distress.   HENT:   Head: Normocephalic and atraumatic.   Right Ear: External ear normal.   Left Ear: External ear normal.   Nose: Nose normal.   Mouth/Throat: Oropharynx is clear and moist. No oropharyngeal exudate.   Eyes: Conjunctivae and EOM are normal. Pupils are equal,  round, and reactive to light. Right eye exhibits no discharge. Left eye exhibits no discharge. No scleral icterus.   Neck: Normal range of motion. Neck supple. No JVD present. No tracheal deviation present. No thyromegaly present.   Cardiovascular: Normal rate and normal heart sounds. Exam reveals no gallop and no friction rub.   No murmur heard.  Pulmonary/Chest: Effort normal. No stridor. No respiratory distress. She has no wheezes. She has no rhonchi. She has no rales. She exhibits no tenderness.   Abdominal: Soft. Bowel sounds are normal. She exhibits no distension. There is no hepatosplenomegaly or splenomegaly. There is tenderness (Mild tenderness left upper and left lower quadrant, no guarding or rebound.  Bowel sounds are normal.  No palpable organomegaly or pulsatile masses.). There is no rigidity, no rebound, no guarding, no CVA tenderness, no tenderness at McBurney's point and negative Red's sign.   Musculoskeletal: Normal range of motion. She exhibits no edema, tenderness or deformity.   Neurological: She is alert and oriented to person, place, and time. No cranial nerve deficit. She exhibits normal muscle tone. Coordination normal.   Skin: Skin is warm and dry. No rash noted. She is not diaphoretic. No erythema. No pallor.   Psychiatric: She has a normal mood and affect. Her behavior is normal. Judgment and thought content normal.   Nursing note and vitals reviewed.      Procedures           ED Course  ED Course as of Feb 17 2333   Sun Feb 17, 2019   5009 Patient remained stable during course of stay in emergency department, until returning from CT scan for CT of the chest with contrast to evaluate right upper and lower lobe mass / infiltrate identified per chest x-ray.  Upon her return from CT, she was found to be pale, diaphoretic, semi-conscious, with oxygen saturations are around 75% on room air.  She was immediately placed on 100% nonrebreather with near immediate return of normal oxygen  saturations, mentation.  Patient still denies having chest or back pain.  Her repeat EKG (pre-event) shows subtle inversion of T waves in the lateral leads, and a second troponin that resulted from same time she came back from CT was elevated at 0.23.  A third postevent EKG was obtained, with no appreciable change.  Arterial blood gas performed remarkable for PCO2 45, and pH of 7.28.  BiPAP was initiated.  She was given a 324 mg aspirin and heparinized.  We'll admit to telemetry for N STEMI, mixed respiratory failure, controlled diabetes, and multifocal pneumonia  [TG]      ED Course User Index  [TG] Yves Zaragoza PA-C                HEART Score (for prediction of 6-week risk of major adverse cardiac event) reviewed and/or performed as part of the patient evaluation and treatment planning process.  The result associated with this review/performance is: 6    JANI (UA/NSTEMI) Index (for mortality estimate in patients with unstable angina or non-STEMI) reviewed and/or performed as part of the patient evaluation and treatment planning process.  The result associated with this review/performance is: 5       MDM      Final diagnoses:   NSTEMI (non-ST elevated myocardial infarction) (CMS/HCC)   Acute respiratory failure with hypoxia and hypercapnia (CMS/HCC)   Multifocal pneumonia   Essential hypertension   Poorly controlled diabetes mellitus (CMS/HCC)            Yves Zaragoza PA-C  02/17/19 6724

## 2019-02-18 NOTE — PROGRESS NOTES
Discharge Planning Assessment  [facility]     Patient Name: Micaela Mc  MRN: [id number]  Today's Date: 2/18/2019    Admit Date: 2/17/2019    Discharge Needs Assessment     Row Name 02/18/19 1526       Living Environment    Name(s) of Who Lives With Patient  Ruddy Cruz    Current Living Arrangements  home/apartment/condo    Primary Care Provided by  self    Provides Primary Care For  no one    Family Caregiver if Needed  spouse    Family Caregiver Names  Spouse, Ruddy Mc    Quality of Family Relationships  supportive;helpful;involved    Able to Return to Prior Arrangements  yes    Living Arrangement Comments  Lives with spouse in house with 1 step at entrance.       Resource/Environmental Concerns    Resource/Environmental Concerns  none    Transportation Concerns  car, none       Transition Planning    Patient/Family Anticipates Transition to  home with family    Patient/Family Anticipated Services at Transition  none    Transportation Anticipated  family or friend will provide       Discharge Needs Assessment    Concerns to be Addressed  no discharge needs identified    Equipment Currently Used at Home  bipap/cpap    Anticipated Changes Related to Illness  none    Equipment Needed After Discharge  none        Discharge Plan     Row Name 02/18/19 1528       Plan    Plan  Plan is home with spouse at discharge    Patient/Family in Agreement with Plan  yes    Plan Comments  Met with patient at bedside.  Lives with spouse in house with1 step at entry.  PCP is Katya Howard,  No home health.   Has CPAP.  Following for D/C needs    Final Discharge Disposition Code  01 - home or self-care        Destination      No service coordination in this encounter.      Durable Medical Equipment      No service coordination in this encounter.      Dialysis/Infusion      No service coordination in this encounter.      Home Medical Care      No service coordination in this encounter.      Community Resources       No service coordination in this encounter.          Demographic Summary     Row Name 02/18/19 1525       General Information    Admission Type  inpatient    Arrived From  emergency department    Referral Source  admission list    Reason for Consult  discharge planning    Preferred Language  English        Functional Status     Row Name 02/18/19 1526       Functional Status    Usual Activity Tolerance  fair    Current Activity Tolerance  fair       Functional Status, IADL    Medications  independent    Meal Preparation  independent    Housekeeping  assistive person    Laundry  assistive person    Shopping  assistive person    IADL Comments  Spouse assists with housekeeping, laundry and shopping        Psychosocial    No documentation.       Abuse/Neglect    No documentation.       Legal    No documentation.       Substance Abuse    No documentation.       Patient Forms    No documentation.           Miri Osuna, RN

## 2019-02-18 NOTE — PLAN OF CARE
Problem: Patient Care Overview  Goal: Plan of Care Review   02/18/19 1045   Coping/Psychosocial   Plan of Care Reviewed With patient   Plan of Care Review   Progress improving   OTHER   Outcome Summary Patient denies chest pain elevated troponins last at 05:51 2.247. Cards has been consulted and Mag replacement is being started       Problem: Fall Risk (Adult)  Goal: Identify Related Risk Factors and Signs and Symptoms  Outcome: Ongoing (interventions implemented as appropriate)   02/18/19 1045   Fall Risk (Adult)   Related Risk Factors (Fall Risk) environment unfamiliar;sleep pattern alteration   Signs and Symptoms (Fall Risk) presence of risk factors     Goal: Absence of Fall  Outcome: Ongoing (interventions implemented as appropriate)   02/18/19 1045   Fall Risk (Adult)   Absence of Fall making progress toward outcome       Problem: Cardiac: ACS (Acute Coronary Syndrome) (Adult)  Goal: Signs and Symptoms of Listed Potential Problems Will be Absent, Minimized or Managed (Cardiac: ACS)  Outcome: Ongoing (interventions implemented as appropriate)   02/18/19 1045   Goal/Outcome Evaluation   Problems Assessed (Acute Coronary Syndrome) all   Problems Present (Acute Coronary Syn) situational response       Problem: ARDS (Acute Resp Distress Syndrome) (Adult)  Goal: Signs and Symptoms of Listed Potential Problems Will be Absent, Minimized or Managed (ARDS)  Outcome: Ongoing (interventions implemented as appropriate)   02/18/19 1045   Goal/Outcome Evaluation   Problems Assessed (Acute Respiratory Distress Syndrome) all   Problems Present (ARDS) hypoxia/hypoxemia;situational response

## 2019-02-18 NOTE — CONSULTS
[facility] at [facility]   Consult    Micaela Mc  1950    There is no work phone number on file.      02/18/19    DATE OF ADMISSION: 2/17/2019  [facility] 2F    Katya Howard MD  [address] [facility] [address] / [facility] [id number]    Chief Complaint/Reason for Consultation: NSTEMI    Problem List:  1. Recurrent diaphoresis spells.  2. Hypertension.  1. Echo 1/21/15: EF 55-60%, mild MR/TR  2. MPS 1/22/15: EF 55% at rest, 57% with stress, normal cardiac PET nuclear perfusion study  3. Dyslipidemia.  4. Murmur.  5. Type 2 diabetes mellitus.  6. GERD.  7. Peptic ulcer disease.  8. Depression.  9. Obstructive sleep apnea, intolerant to CPAP.  10. Iron deficiency.  11. Tonsillectomy.  12. Partial hysterectomy.  13. Appendectomy.  14. Right ankle fracture.    15. Remote LAP-BAND surgery.      History of Present Illness:   Patient is a 68-year-old  female with a past medical history significant for hypertension, hyperlipidemia, diabetes mellitus type 2, [place], GERD, who presented to [facility] ED yesterday with complaints of new onset left-sided upper abdominal pain that initially occurred at rest while she was at [place].  She reports that it was initially dull in nature and mild only rating 1-2 out of 10.  She felt that this was indigestion.  She went home and took a nap, woke up, and reports that the pain was worse.  This was associated with nausea and diaphoresis.  She denied any vomiting, dizziness, lightheadedness, or syncope.  She reports she took a sublingual Hycosamine with no relief.  Subsequently, she presented to the ED for further evaluation.  Initial troponin 0.23, 0.76, 2.247.  EKG this morning with new T-wave inversion in the inferior and anterolateral leads.  She was given aspirin 325 mg, 300 mg Plavix and initiated on a heparin drip.  CT chest concerning for possible right sided pneumonia for which she was started on antibiotics.  Of note, she reports she has  had worsening fatigue over the last month and a half but denies any exertional chest pains.  She does report that she has chronic dyspnea on exertion with activity such as walking up the stairs but reports that this has been unchanged in nature.  She is a nonsmoker.  Her mother did have an MI in her 50s.    Allergies   Allergen Reactions   • Codeine Itching       Prior to Admission Medications     Prescriptions Last Dose Informant Patient Reported? Taking?    atenolol (TENORMIN) 50 MG tablet  Self Yes Yes    Take 50 mg by mouth Daily.    Calcium Carb-Cholecalciferol (CALCIUM 1000 + D PO)  Self Yes Yes    Take 1,000 mg by mouth Daily.    ferrous sulfate 324 (65 FE) MG tablet delayed-release EC tablet  Self Yes Yes    Take 324 mg by mouth Daily With Breakfast.    FLUoxetine (PROzac) 20 MG capsule  Self Yes Yes    Take 40 mg by mouth Daily.    glimepiride (AMARYL) 1 MG tablet  Self Yes Yes    Take 1 mg by mouth Every Morning Before Breakfast.    hyoscyamine (LEVSIN) 0.125 MG SL tablet   Yes Yes    Take 0.125 mg by mouth Every 4 (Four) Hours As Needed for Cramping.    lansoprazole (PREVACID) 30 MG capsule   Yes Yes    Take 30 mg by mouth Daily.    metFORMIN (GLUCOPHAGE) 500 MG tablet  Self Yes Yes    Take 500 mg by mouth 2 (Two) Times a Day With Meals.    metoprolol tartrate (LOPRESSOR) 50 MG tablet   Yes Yes    Take 50 mg by mouth 2 (Two) Times a Day.    aspirin 81 MG EC tablet  Self Yes No    Take 81 mg by mouth Daily.    losartan-hydrochlorothiazide (HYZAAR) 100-25 MG per tablet  Self Yes No    Take 1 tablet by mouth Daily.    pantoprazole (PROTONIX) 40 MG EC tablet  Self Yes No    Take 40 mg by mouth Daily.    simvastatin (ZOCOR) 20 MG tablet  Self Yes No    Take 20 mg by mouth Every Night.            Current Facility-Administered Medications:   •  acetaminophen (TYLENOL) tablet 650 mg, 650 mg, Oral, Q6H PRN, Saloni Wu, APRN, 650 mg at 02/18/19 0607  •  [START ON 2/19/2019] aspirin chewable tablet 81 mg, 81  mg, Oral, Daily, Mirna Nguyen APRN  •  atorvastatin (LIPITOR) tablet 80 mg, 80 mg, Oral, Nightly, Mirna Nguyen APRN  •  [START ON 2/19/2019] cefTRIAXone (ROCEPHIN) IVPB 1 g, 1 g, Intravenous, Q24H, Mirna Nguyen APRN  •  dextrose (D50W) 25 g/ 50mL Intravenous Solution 25 g, 25 g, Intravenous, Q15 Min PRN, Mirna Nguyen APRN  •  dextrose (GLUTOSE) oral gel 15 g, 15 g, Oral, Q15 Min PRN, Mirna Nguyen APRN  •  doxycycline (VIBRAMYCIN) 100 mg/100 mL 0.9% NS MBP, 100 mg, Intravenous, Q12H, Mirna Nguyen APRN, 100 mg at 02/18/19 0249  •  ferrous sulfate tablet 325 mg, 325 mg, Oral, Daily With Breakfast, Mirna Nguyen APRN, 325 mg at 02/18/19 0756  •  FLUoxetine (PROzac) capsule 40 mg, 40 mg, Oral, Daily, Mirna Nguyen APRN, 40 mg at 02/18/19 0755  •  glucagon (human recombinant) (GLUCAGEN DIAGNOSTIC) injection 1 mg, 1 mg, Subcutaneous, PRN, Mirna Nguyen APRN  •  heparin 73930 units/250 mL (100 units/mL) in 0.45 % NaCl infusion, 9 Units/kg/hr, Intravenous, Titrated, Yves Zaragoza PA-C, Last Rate: 9.9 mL/hr at 02/17/19 2249, 9 Units/kg/hr at 02/17/19 2249  •  HYDROmorphone (DILAUDID) injection 0.5 mg, 0.5 mg, Intravenous, Q15 Min PRN, Larry Verduzco MD, 0.5 mg at 02/17/19 1957  •  insulin lispro (humaLOG) injection 0-14 Units, 0-14 Units, Subcutaneous, 4x Daily With Meals & Nightly, Adin Kaplan MD, 5 Units at 02/18/19 0823  •  ipratropium-albuterol (DUO-NEB) nebulizer solution 3 mL, 3 mL, Nebulization, Q4H PRN, Patrick, Mirna, APRN  •  Magnesium Sulfate 2 gram Bolus, followed by 8 gram infusion (total Mg dose 10 grams)- Mg less than or equal to 1mg/dL, 2 g, Intravenous, PRN **OR** Magnesium Sulfate 2 gram / 50mL Infusion (GIVE X 3 BAGS TO EQUAL 6GM TOTAL DOSE) - Mg 1.1 - 1.5 mg/dl, 2 g, Intravenous, PRN **OR** Magnesium Sulfate 4 gram infusion- Mg 1.6-1.9 mg/dL, 4 g, Intravenous, PRN, Gertrude Solorzano, PA-C  •  metoprolol tartrate (LOPRESSOR) tablet 50 mg, 50 mg, Oral, Q12H, Patrick  DIONNE Bradley, 50 mg at 02/18/19 0756  •  nitroglycerin (NITROSTAT) SL tablet 0.4 mg, 0.4 mg, Sublingual, Q5 Min PRN, Mirna Nguyen APRN  •  pantoprazole (PROTONIX) EC tablet 40 mg, 40 mg, Oral, Q AM, Mirna Nguyen APRN, 40 mg at 02/18/19 0607  •  Pharmacy to Dose Heparin, , Does not apply, Continuous PRN, Olvin Kong, [facility]  •  sodium chloride 0.9 % flush 10 mL, 10 mL, Intravenous, PRN, Larry Verduzco MD  •  sodium chloride 0.9 % flush 3 mL, 3 mL, Intravenous, Q12H, Mirna Nguyen APRN, 3 mL at 02/18/19 0224  •  sodium chloride 0.9 % flush 3-10 mL, 3-10 mL, Intravenous, PRN, Mirna Nguyen APRN    Social History     Socioeconomic History   • Marital status:      Spouse name: Not on file   • Number of children: Not on file   • Years of education: Not on file   • Highest education level: Not on file   Tobacco Use   • Smoking status: Never Smoker   • Smokeless tobacco: Never Used   Substance and Sexual Activity   • Alcohol use: Yes     Comment: OCCASIONALLY    • Drug use: No   • Sexual activity: Defer       Family History   Problem Relation Age of Onset   • Breast cancer Neg Hx    • Colon cancer Neg Hx    • Endometrial cancer Neg Hx    • Ovarian cancer Neg Hx        REVIEW OF SYSTEMS:   CONST:  No weight loss, fever, chills, weakness + fatigue.   HEENT:  No visual loss, blurred vision, double vision, yellow sclerae.                   No hearing loss, congestion, sore throat.   SKIN:      No rashes, urticaria, ulcers, sores.     CARD:     + lower left sided CP, no palpitations, edema  RESP:     + shortness of breath, no hemoptysis, cough, sputum.   GI:           No anorexia, nausea, vomiting, diarrhea. + LUQ abdominal pain, no melena.   :         No burning on urination, hematuria or increased frequency.  ENDO:    + diaphoresis, no cold or heat intolerance. No polyuria or polydipsia.   NEURO:  No headache, dizziness, syncope, paralysis, ataxia, or parasthesias.                  No change in bowel or  "bladder control. No history of CVA/TIA  MUSC:    No muscle, back pain, joint pain or stiffness.   HEME:    No anemia, bleeding, bruising. No history of DVT/PE.  PSYCH:  No history of depression, anxiety    OBJECTIVE:    Vitals:    02/18/19 0040 02/18/19 0047 02/18/19 0405 02/18/19 0722   BP:  147/85 159/95 147/85   BP Location:  Right arm Right arm Right arm   Patient Position:  Lying Lying Lying   Pulse:  86 90 90   Resp: 24 22 18 18   Temp:  96.1 °F (35.6 °C) 98 °F (36.7 °C) 98.4 °F (36.9 °C)   TempSrc:  Axillary Oral Oral   SpO2: 98% 98% 99% 98%   Weight:  110 kg (242 lb)     Height:  167.6 cm (66\")           Vital Sign Min/Max for last 24 hours  Temp  Min: 96.1 °F (35.6 °C)  Max: 98.4 °F (36.9 °C)   BP  Min: 93/65  Max: 176/87   Pulse  Min: 72  Max: 90   Resp  Min: 16  Max: 24   SpO2  Min: 91 %  Max: 99 %   Flow (L/min)  Min: 5  Max: 15      Intake/Output Summary (Last 24 hours) at 2/18/2019 1012  Last data filed at 2/18/2019 0400  Gross per 24 hour   Intake 700 ml   Output 500 ml   Net 200 ml             Physical Exam:  GEN: Obese female, well nourished, well-developed, no acute distress  HEENT: Normocephalic, atraumatic, PERRLA, moist mucous membranes  NECK: Supple, No JVD, no thyromegaly, no lymphadenopathy, no carotid bruit noted  CARD: S1S2, RRR, no murmur, gallop, or rub  LUNGS: Clear to auscultation, normal respiratory effort  ABDOMEN: Soft, nontender, normal bowel sounds  EXTREMITIES: No gross deformities, no clubbing, cyanosis, or edema  SKIN: Warm, dry  NEURO: No focal deficits, alert and oriented x 3  PSYCHIATRIC: Normal affect and mood      Data:   Results from last 7 days   Lab Units 02/18/19  0358 02/17/19  1859   WBC 10*3/mm3 16.93* 14.70*   HEMOGLOBIN g/dL 14.9 15.6*   HEMATOCRIT % 45.5* 47.2*   PLATELETS 10*3/mm3 279 261     Results from last 7 days   Lab Units 02/18/19  0358 02/17/19  1859   SODIUM mmol/L 138 143   POTASSIUM mmol/L 4.8 4.5   CHLORIDE mmol/L 106 104   CO2 mmol/L 23.0 25.0   BUN " mg/dL 12 14   CREATININE mg/dL 0.84 0.98   GLUCOSE mg/dL 319* 230*      Results from last 7 days   Lab Units 02/18/19  0358   HEMOGLOBIN A1C % 7.70*     Results from last 7 days   Lab Units 02/18/19  0358   TSH mIU/mL 1.797         Results from last 7 days   Lab Units 02/17/19  1859   PROTIME Seconds 12.5   INR  0.98   APTT seconds 30.8*     Results from last 7 days   Lab Units 02/18/19  0551   TROPONIN I ng/mL 2.247*     Results from last 7 days   Lab Units 02/18/19  0358   CHOLESTEROL mg/dL 213*   TRIGLYCERIDES mg/dL 176*   HDL CHOL mg/dL 49   LDL CHOL mg/dL 148*           Intake/Output Summary (Last 24 hours) at 2/18/2019 1012  Last data filed at 2/18/2019 0400  Gross per 24 hour   Intake 700 ml   Output 500 ml   Net 200 ml       Chest X-Ray:  Imaging Results (last 24 hours)     Procedure Component Value Units Date/Time    XR Chest 1 View [[id number]] Collected:  02/18/19 0011     Updated:  02/18/19 0144    Narrative:       EXAM:    XR Chest, 1 View     EXAM DATE/TIME:    2/18/2019 12:11 AM     CLINICAL HISTORY:    68 years old, female; Type 2 diabetes mellitus with hyperglycemia; Essential   (primary) hypertension; Non-st elevation (nstemi) myocardial infarction;   Pneumonia, unspecified organism; Acute respiratory failure with hypoxia; Acute   respiratory failure with hypercapnia; Signs and symptoms; Dyspnea; Additional   info: Repeat cxr     TECHNIQUE:    XR of the chest, 1 view.     COMPARISON:    CR XR CHEST 1 VW 2/17/2019 7:11 PM     FINDINGS:     Heart size within normal limits. Moderate mixed interstitial/alveolar opacities   throughout both lungs, confluent in the right lung apex and right lung base,   overall increased from prior study. Appearance is consistent with pulmonary   edema; pneumonia could be coexisting. No visible pneumothorax or pleural   effusion.      Impression:       1. Moderate mixed interstitial/alveolar opacities throughout both lungs,   confluent in the right lung apex and right lung  base, overall increased from   prior study. Appearance is consistent with pulmonary edema; pneumonia could be   coexisting.    THIS DOCUMENT HAS BEEN ELECTRONICALLY SIGNED BY ROSSI BERNARDO MD    CT Chest With Contrast [[id number]] Collected:  02/17/19 2129     Updated:  02/17/19 2258    Narrative:       EXAM:    CT Chest With Contrast     EXAM DATE/TIME:    2/17/2019 9:29 PM     CLINICAL HISTORY:    68 years old, female; Abnormal findings; Abnormal radiologic exam of lung or   chest; Patient HX: Abnormal CT abdomen this admit; Rll lung; Additional info:   Rul / rll mass / infiltrate     TECHNIQUE:    Axial computed tomography images of the chest with intravenous contrast.    All CT scans at this facility use at least one of these dose optimization   techniques: automated exposure control; mA and/or kV adjustment per patient   size (includes targeted exams where dose is matched to clinical indication); or   iterative reconstruction.    Coronal and sagittal reformatted images were created and reviewed.     CONTRAST:    70 ml of isovue 300 administered intravenously.     COMPARISON:    CR XR CHEST 1 VW 2/17/2019 7:11 PM     FINDINGS:    Lungs:  Diffuse interstitial thickening, bilaterally. Focal confluent   consolidation in the peripheral aspect of the right upper lobe measuring   approximately 8.5 x 3.4 x 4.4 cm (series 2 image 13 and series 4 image 38).   Redemonstration of the focal opacity in the right lower lobe measuring 3.2 x   2.4 x 3.2 cm (series 235). There are subtle groundglass opacities throughout   both lungs.    Pleural space: Normal. No pneumothorax. No pleural effusion.    Heart:  Negative for right heart strain. Mild cardiomegaly. No pericardial   effusion.    Aorta:  Aorta and arteries have atherosclerotic calcification. No aortic   aneurysm.    Lymph nodes: Unremarkable. No enlarged lymph nodes.    Bones/joints:  Multilevel degenerative spondylosis of the spine. No acute   fracture or dislocation.     Soft tissues: Unremarkable.    Gallbladder and bile ducts:  Redemonstration of a gallstone in the neck with   subtle adjacent fat stranding.    Stomach and bowel:  Status post gastric banding.       Impression:       1. Redemonstration of the focal confluent regions of consolidation in the   peripheral aspect of the right upper lobe (corresponding to the radiographs)   and in the right lower lobe as described. Additional diffuse scattered regions   of groundglass opacities and diffuse interstitial thickening, bilaterally.     Although these findings are nonspecific, consider multilobar pneumonia with   considerations for both typical and atypical etiologies. Please correlate   clinically to exclude the possibility of early acute respiratory distress   syndrome (ARDS). Followup CT of the chest in 3 months is advised to document   resolution and exclude other less likely etiologies.     2. Negative for pulmonary embolism.     THIS DOCUMENT HAS BEEN ELECTRONICALLY SIGNED BY XU BAHENA MD    CT Abdomen Pelvis With Contrast [[id number]] Collected:  02/17/19 1957     Updated:  02/17/19 2128    Narrative:       EXAM:    CT Abdomen and Pelvis With Contrast     EXAM DATE/TIME:    2/17/2019 7:57 PM     CLINICAL HISTORY:    68 years old, female; Pain; Abdominal pain; Additional info: Llq abd pain     TECHNIQUE:    Axial computed tomography images of the abdomen and pelvis with intravenous   contrast.    All CT scans at this facility use at least one of these dose optimization   techniques: automated exposure control; mA and/or kV adjustment per patient   size (includes targeted exams where dose is matched to clinical indication); or   iterative reconstruction.    Coronal reformatted images were created and reviewed.     CONTRAST:    100 ml of  administered intravenously.     COMPARISON:    CT ABD PELVIS WO CONTRAST 3/16/2016 2:18 PM     FINDINGS:    Lower thorax:  Focal groundglass opacity abutting the pleura in the  right   lower lobe measures 3 x 2 cm (series 2 image 3). Subtle groundglass opacities   throughout the lung bases, bilaterally. Diffuse interstitial thickening.     ABDOMEN:    Liver: Normal. No mass.    Gallbladder and bile ducts:  There is a gallstone in the gallbladder neck   measuring up to 1.4 cm (series 2 image 25). There are adjacent subtle   inflammatory changes in the gallbladder neck region. No biliary dilatation.    Pancreas: Normal. No ductal dilation.    Spleen: Normal. No splenomegaly.    Adrenals: Normal. No mass.    Kidneys and ureters: Normal. No hydronephrosis.    Stomach and bowel:  Status post gastric banding. No abnormal dilatation of the   proximal esophagus to the extent imaged. Scattered colonic diverticula without   inflammatory changes to suggest acute diverticulitis. No evidence of intestinal   perforation or obstruction.    Appendix:  Status post appendectomy.     PELVIS:    Bladder:  No calcified stones in the incompletely distended urinary bladder.    Reproductive:  Status post hysterectomy. No adnexal cysts or masses are   identified.     ABDOMEN and PELVIS:    Intraperitoneal space: Normal. No free air. No significant fluid collection.    Bones/joints:  Multilevel degenerative spondylosis of the spine. No acute   fracture or dislocation.    Soft tissues:  Small fat containing umbilical hernia.    Vasculature:  Aorta and arteries have atherosclerotic calcification. No   abdominal aortic aneurysm.    Lymph nodes: Normal. No enlarged lymph nodes.       Impression:       1. Gallstone in the gallbladder neck with subtle djacent inflammatory changes.   Consider further assessment with a right quadrant ultrasound as clinically   warranted if there is concern for acute cholecystitis.   2. Status post gastric banding with no evident complication.   3. Nonspecific focal ground glass opacity abutting the pleura in the right   lower lobe (3 x 2 cm). Although nonspecific, consider pulmonary  infection, to   include atypical etiologies, such as fungal or mycobacterium. Malignancy is a   differential consideration.     Further assessment with a CT of the chest is advised for further assessment of   the right upper lung opacity seen on the chest radiograph from the same day.     4. Additionally, there are subtle groundglass opacities of both lung bases and   interstitial thickening, nonspecific findings.   5. Colonic diverticulosis without diverticulitis.   6. Additional chronic findings as described.     THIS DOCUMENT HAS BEEN ELECTRONICALLY SIGNED BY XU BAHENA MD    XR Chest 1 View [[id number]] Collected:  02/17/19 1901     Updated:  02/17/19 2113    Narrative:       EXAM:    XR Chest, 1 View     EXAM DATE/TIME:    2/17/2019 7:01 PM     CLINICAL HISTORY:    68 years old, female; Pain; Other: See notes; Additional info: Upper abdominal   pain triage protocol     TECHNIQUE:    XR of the chest, 1 view.     COMPARISON:    No relevant prior studies available.     FINDINGS:    Lungs:  There is right lower lobe opacity at the level of the diaphragm which   is seen greater advantage on the comparison CT. There is an additional focal   opacity the peripheral aspect of the right upper lung located at the level of   the peripheral third-fifth ribs abutting the pleura, a nonspecific finding.    Pleural space:  No pleural effusion or pneumothorax.    Heart/Mediastinum:  There is mild cardiomegaly and pericardial lipomatosis as   seen on the CT from the same date.    Bones/joints: Unremarkable.       Impression:       Nonspecific right lower lobe opacity at the level of the diaphragm which is   seen to greater advantage of comparison CT and additional focal opacity at the   peripheral aspect of the right upper lung as described. Although nonspecific   findings, consider pulmonary infection or malignancy. Further assessment with a   CT of the chest is advised.     THIS DOCUMENT HAS BEEN ELECTRONICALLY SIGNED BY  "XU BAHENA MD          Telemetry: NSR, HR 72-90 bpm  EKG: NSR, HR 90 bpm, TWI in inferior and anterolateral leads, new compared to EKG from yesterday    Assessment and Plan:   1. NSTEMI:  -Patient with new onset left-sided abdominal pain associated with diaphoresis and nausea.  She has had elevation of her biomarkers with most recent troponin 2.247 and new T-wave inversions noted on her EKG.  She has been NPO since midnight.  Will proceed with left heart catheterization +/- CBI with Dr. Cutler.      2. Acute respiratory failure/PNA:  -Concern for possible right-sided pneumonia, initiated on antibiotics per primary team    3. Hypertension:  - Restart home losartan/HCTZ    4. Hyperlipidemia:  - On statin  - FLP reviewed.      5. DMII:  - HgA1c 7.7%  - Management per primary team    Electronically signed by Sandee Ivory, DIONNE, 02/18/19, 10:12 AM.  ___________________________________________________________  The patient was seen and examined by me.  Agree and verified/discussed key components of E/M as outlined by the Nurse practitioner/PA.    /85 (BP Location: Right arm, Patient Position: Lying)   Pulse 90   Temp 98.4 °F (36.9 °C) (Oral)   Resp 18   Ht 167.6 cm (66\")   Wt 110 kg (242 lb)   SpO2 98%   Breastfeeding? No   BMI 39.06 kg/m²     General Appearance:   · well developed  · well nourished  Neck:  · thyroid not enlarged  · supple  Respiratory:  · no respiratory distress  · normal breath sounds  · no rales  Cardiovascular:  · no jugular venous distention  · regular rhythm  · apical impulse normal  · S1 normal, S2 normal  · no S3, no S4   · no murmur  · no rub, no thrill  · carotid pulses normal; no bruit  · pedal pulses normal  · lower extremity edema: none  .   Skin:   · warm, dry        Plan:    Chest discomfort started yesterday, off and on, still present but much improved  Troponin still climbing, currently 2.2  EKG shows inferior and anterior lateral T-wave inversions, " more accentuated than previous ECG  Patient has multiple risk factors for coronary disease including diabetes mellitus, obesity, hypertension, and hyperlipidemia  Cardiac catheterization with possible PCI recommended  Continue current medical therapy with aspirin, Plavix, heparin, statin, and beta blocker    The risks, benefits, and alternative options of cardiac catheterization were discussed with the patient and her  at bedside.  The risks include death, MI, stroke, infection, vascular injury requiring surgical repair and/or blood transfusion, coronary dissection, renal dysfunction/failure, allergic reaction, emergent CABG.  If PCI is needed there is a 1-2% risk of emergent CABG.  The patient is agreeable for cardiac catheterization, possible PCI or CABG. Plan is to proceed with cardiac catheterization and possible PCI.   I personally reviewed the patient's chest x-ray image which shows multilobar infiltrates, and I personally reviewed her EKG tracing which shows sinus rhythm with inferior and anterolateral T-wave inversions with no ST deviation.    Martir Cutler MD, [facility]

## 2019-02-19 PROBLEM — I50.811 ACUTE SYSTOLIC RIGHT HEART FAILURE: Status: ACTIVE | Noted: 2019-02-19

## 2019-02-19 PROBLEM — I51.81 TAKOTSUBO CARDIOMYOPATHY: Status: ACTIVE | Noted: 2019-02-19

## 2019-02-19 LAB
ANION GAP SERPL CALCULATED.3IONS-SCNC: 6 MMOL/L (ref 3–11)
BUN BLD-MCNC: 10 MG/DL (ref 9–23)
BUN/CREAT SERPL: 11 (ref 7–25)
CALCIUM SPEC-SCNC: 9.1 MG/DL (ref 8.7–10.4)
CHLORIDE SERPL-SCNC: 102 MMOL/L (ref 99–109)
CO2 SERPL-SCNC: 28 MMOL/L (ref 20–31)
CREAT BLD-MCNC: 0.91 MG/DL (ref 0.6–1.3)
DEPRECATED RDW RBC AUTO: 50.4 FL (ref 37–54)
ERYTHROCYTE [DISTWIDTH] IN BLOOD BY AUTOMATED COUNT: 14.4 % (ref 11.3–14.5)
GFR SERPL CREATININE-BSD FRML MDRD: 61 ML/MIN/1.73
GLUCOSE BLD-MCNC: 227 MG/DL (ref 70–100)
GLUCOSE BLDC GLUCOMTR-MCNC: 194 MG/DL (ref 70–130)
GLUCOSE BLDC GLUCOMTR-MCNC: 199 MG/DL (ref 70–130)
HCT VFR BLD AUTO: 40.4 % (ref 34.5–44)
HGB BLD-MCNC: 12.9 G/DL (ref 11.5–15.5)
M PNEUMONIAE IGG ABS: 216 U/ML (ref 0–99)
M PNEUMONIAE IGM ABS: <770 U/ML (ref 0–769)
MAGNESIUM SERPL-MCNC: 2.4 MG/DL (ref 1.3–2.7)
MCH RBC QN AUTO: 30.6 PG (ref 27–31)
MCHC RBC AUTO-ENTMCNC: 31.9 G/DL (ref 32–36)
MCV RBC AUTO: 96 FL (ref 80–99)
PLATELET # BLD AUTO: 193 10*3/MM3 (ref 150–450)
PMV BLD AUTO: 12 FL (ref 6–12)
POTASSIUM BLD-SCNC: 4.4 MMOL/L (ref 3.5–5.5)
RBC # BLD AUTO: 4.21 10*6/MM3 (ref 3.89–5.14)
SODIUM BLD-SCNC: 136 MMOL/L (ref 132–146)
WBC NRBC COR # BLD: 13.51 10*3/MM3 (ref 3.5–10.8)

## 2019-02-19 PROCEDURE — 99233 SBSQ HOSP IP/OBS HIGH 50: CPT | Performed by: INTERNAL MEDICINE

## 2019-02-19 PROCEDURE — 80048 BASIC METABOLIC PNL TOTAL CA: CPT | Performed by: INTERNAL MEDICINE

## 2019-02-19 PROCEDURE — 25010000002 HEPARIN (PORCINE) PER 1000 UNITS: Performed by: INTERNAL MEDICINE

## 2019-02-19 PROCEDURE — 85027 COMPLETE CBC AUTOMATED: CPT | Performed by: INTERNAL MEDICINE

## 2019-02-19 PROCEDURE — 82962 GLUCOSE BLOOD TEST: CPT

## 2019-02-19 PROCEDURE — 99232 SBSQ HOSP IP/OBS MODERATE 35: CPT | Performed by: NURSE PRACTITIONER

## 2019-02-19 PROCEDURE — 83735 ASSAY OF MAGNESIUM: CPT | Performed by: INTERNAL MEDICINE

## 2019-02-19 PROCEDURE — 25010000002 VANCOMYCIN 10 G RECONSTITUTED SOLUTION: Performed by: INTERNAL MEDICINE

## 2019-02-19 PROCEDURE — 25010000002 CEFTRIAXONE PER 250 MG: Performed by: NURSE PRACTITIONER

## 2019-02-19 RX ORDER — FUROSEMIDE 40 MG/1
40 TABLET ORAL DAILY
Status: DISCONTINUED | OUTPATIENT
Start: 2019-02-19 | End: 2019-02-20 | Stop reason: HOSPADM

## 2019-02-19 RX ORDER — HEPARIN SODIUM 5000 [USP'U]/ML
5000 INJECTION, SOLUTION INTRAVENOUS; SUBCUTANEOUS EVERY 8 HOURS SCHEDULED
Status: DISCONTINUED | OUTPATIENT
Start: 2019-02-19 | End: 2019-02-20 | Stop reason: HOSPADM

## 2019-02-19 RX ADMIN — Medication 325 MG: at 11:10

## 2019-02-19 RX ADMIN — FUROSEMIDE 40 MG: 40 TABLET ORAL at 11:10

## 2019-02-19 RX ADMIN — ACETAMINOPHEN 650 MG: 325 TABLET ORAL at 20:51

## 2019-02-19 RX ADMIN — ATORVASTATIN CALCIUM 80 MG: 40 TABLET, FILM COATED ORAL at 20:52

## 2019-02-19 RX ADMIN — CEFTRIAXONE SODIUM 1 G: 1 INJECTION, SOLUTION INTRAVENOUS at 20:51

## 2019-02-19 RX ADMIN — VANCOMYCIN HYDROCHLORIDE 2250 MG: 10 INJECTION, POWDER, LYOPHILIZED, FOR SOLUTION INTRAVENOUS at 00:16

## 2019-02-19 RX ADMIN — DOXYCYCLINE 100 MG: 100 INJECTION, POWDER, LYOPHILIZED, FOR SOLUTION INTRAVENOUS at 12:46

## 2019-02-19 RX ADMIN — INSULIN LISPRO 3 UNITS: 100 INJECTION, SOLUTION INTRAVENOUS; SUBCUTANEOUS at 17:42

## 2019-02-19 RX ADMIN — SODIUM CHLORIDE, PRESERVATIVE FREE 3 ML: 5 INJECTION INTRAVENOUS at 20:54

## 2019-02-19 RX ADMIN — HEPARIN SODIUM 5000 UNITS: 5000 INJECTION INTRAVENOUS; SUBCUTANEOUS at 17:40

## 2019-02-19 RX ADMIN — DOXYCYCLINE 100 MG: 100 INJECTION, POWDER, LYOPHILIZED, FOR SOLUTION INTRAVENOUS at 23:50

## 2019-02-19 RX ADMIN — PANTOPRAZOLE SODIUM 40 MG: 40 TABLET, DELAYED RELEASE ORAL at 06:06

## 2019-02-19 RX ADMIN — SACUBITRIL AND VALSARTAN 1 TABLET: 49; 51 TABLET, FILM COATED ORAL at 11:10

## 2019-02-19 RX ADMIN — METOPROLOL TARTRATE 50 MG: 50 TABLET ORAL at 11:11

## 2019-02-19 RX ADMIN — METOPROLOL TARTRATE 50 MG: 50 TABLET ORAL at 20:52

## 2019-02-19 RX ADMIN — SODIUM CHLORIDE, PRESERVATIVE FREE 3 ML: 5 INJECTION INTRAVENOUS at 11:12

## 2019-02-19 RX ADMIN — ASPIRIN 81 MG 81 MG: 81 TABLET ORAL at 11:10

## 2019-02-19 RX ADMIN — SACUBITRIL AND VALSARTAN 1 TABLET: 49; 51 TABLET, FILM COATED ORAL at 20:51

## 2019-02-19 RX ADMIN — FLUOXETINE HYDROCHLORIDE 40 MG: 20 CAPSULE ORAL at 11:10

## 2019-02-19 RX ADMIN — DOXYCYCLINE 100 MG: 100 INJECTION, POWDER, LYOPHILIZED, FOR SOLUTION INTRAVENOUS at 02:37

## 2019-02-19 RX ADMIN — INSULIN LISPRO 5 UNITS: 100 INJECTION, SOLUTION INTRAVENOUS; SUBCUTANEOUS at 20:52

## 2019-02-19 RX ADMIN — INSULIN LISPRO 5 UNITS: 100 INJECTION, SOLUTION INTRAVENOUS; SUBCUTANEOUS at 08:00

## 2019-02-19 RX ADMIN — INSULIN LISPRO 3 UNITS: 100 INJECTION, SOLUTION INTRAVENOUS; SUBCUTANEOUS at 12:45

## 2019-02-19 NOTE — PROGRESS NOTES
[facility] Medicine Services  PROGRESS NOTE    Patient Name: Micaela Mc  : 1950  MRN: [id number]    Date of Admission: 2019  Length of Stay: 2  Primary Care Physician: Katya Howard MD    Subjective   Subjective     CC:  F/u CHF, NSTEMI    HPI:  No problems overnight. Denies any further chest pressure.  Breathing is ok.  No fever, cough, sputum.     Review of Systems  Gen- No fevers, chills  CV- No chest pain, palpitations  Resp- No cough, dyspnea  GI- No N/V/D, abd pain    Otherwise ROS is negative except as mentioned in the HPI.    Objective   Objective     Vital Signs:   Temp:  [98 °F (36.7 °C)-99.3 °F (37.4 °C)] 99.3 °F (37.4 °C)  Heart Rate:  [] 82  Resp:  [18-20] 18  BP: (112-145)/(61-88) 132/65        Physical Exam:  Constitutional: No acute distress, awake, alert  HENT: NCAT, mucous membranes moist  Respiratory: mild rales, respiratory effort normal   Cardiovascular: RRR, no murmurs, rubs, or gallops  Gastrointestinal: Positive bowel sounds, soft, nontender, nondistended  Musculoskeletal: No bilateral ankle edema  Psychiatric: Appropriate affect, cooperative  Neurologic: Oriented x 3, no focal deficits  Skin: No rashes      Results Reviewed:  I have personally reviewed current lab, radiology, and data and agree.    Results from last 7 days   Lab Units 19  0503 19  0358 19  1859   WBC 10*3/mm3 13.51* 16.93* 14.70*   HEMOGLOBIN g/dL 12.9 14.9 15.6*   HEMATOCRIT % 40.4 45.5* 47.2*   PLATELETS 10*3/mm3 193 279 261   INR   --   --  0.98     Results from last 7 days   Lab Units 19  0639 19  0551 19  0358 19  2355  19  1859   SODIUM mmol/L 136  --  138  --   --  143   POTASSIUM mmol/L 4.4  --  4.8  --   --  4.5   CHLORIDE mmol/L 102  --  106  --   --  104   CO2 mmol/L 28.0  --  23.0  --   --  25.0   BUN mg/dL 10  --  12  --   --  14   CREATININE mg/dL 0.91  --  0.84  --   --  0.98   GLUCOSE mg/dL 227*  --  319*  --    --  230*   CALCIUM mg/dL 9.1  --  9.6  --   --  10.4   ALT (SGPT) U/L  --   --   --   --   --  55*   AST (SGOT) U/L  --   --   --   --   --  92*   TROPONIN I ng/mL  --  2.247*  --  0.76*   < >  --     < > = values in this interval not displayed.     Estimated Creatinine Clearance: 73.3 mL/min (by C-G formula based on SCr of 0.91 mg/dL).    BNP   Date Value Ref Range Status   02/17/2019 83.0 0.0 - 100.0 pg/mL Final     Comment:     Results may be falsely decreased if patient taking Biotin.       Microbiology Results Abnormal     Procedure Component Value - Date/Time    Blood Culture - Blood, Wrist, Right [[id number]]  (Abnormal) Collected:  02/17/19 2348    Lab Status:  Preliminary result Specimen:  Blood from Wrist, Right Updated:  02/19/19 0013     Blood Culture Abnormal Stain     Gram Stain Anaerobic Bottle Gram positive cocci    Blood Culture ID, PCR - Blood, Wrist, Right [[id number]]  (Abnormal) Collected:  02/17/19 2348    Lab Status:  Final result Specimen:  Blood from Wrist, Right Updated:  02/18/19 2300     BCID, PCR Staphylococcus spp, not aureus. Identification by BCID PCR.    Blood Culture - Blood, Wrist, Right [[id number]]  (Abnormal) Collected:  02/17/19 2348    Lab Status:  Preliminary result Specimen:  Blood from Wrist, Right Updated:  02/18/19 2141     Blood Culture Abnormal Stain     Gram Stain Aerobic Bottle Gram positive cocci in clusters          Imaging Results (last 24 hours)     ** No results found for the last 24 hours. **          Results for orders placed during the hospital encounter of 02/17/19   Adult Transthoracic Echo Complete W/ Cont if Necessary Per Protocol    Narrative · Estimated EF appears to be in the range of 21 - 25%  · Left ventricular systolic function is severely decreased.  · The following left ventricular wall segments are hypokinetic: mid   anterior, mid anterolateral, mid inferoseptal and mid anteroseptal. The   following left ventricular wall segments are akinetic:  apical anterior,   apical lateral, apical inferior, apical septal and apex.  · Left ventricular diastolic dysfunction (grade II) consistent with   pseudonormalization.  · No significant valvular abnormality.  · Calculated right ventricular systolic pressure from tricuspid   regurgitation is 33 mmHg.          I have reviewed the medications:    Current Facility-Administered Medications:   •  acetaminophen (TYLENOL) tablet 650 mg, 650 mg, Oral, Q6H PRN, Saloni Wu, APRN, 650 mg at 02/18/19 1617  •  aspirin chewable tablet 81 mg, 81 mg, Oral, Daily, Mirna Nguyen APRN  •  atorvastatin (LIPITOR) tablet 80 mg, 80 mg, Oral, Nightly, Mirna Nguyen APRN, 80 mg at 02/18/19 2138  •  cefTRIAXone (ROCEPHIN) IVPB 1 g, 1 g, Intravenous, Q24H, Mirna Nguyen APRN  •  dextrose (D50W) 25 g/ 50mL Intravenous Solution 25 g, 25 g, Intravenous, Q15 Min PRN, Mirna Nguyen APRN  •  dextrose (GLUTOSE) oral gel 15 g, 15 g, Oral, Q15 Min PRN, Mirna Nguyen APRN  •  doxycycline (VIBRAMYCIN) 100 mg/100 mL 0.9% NS MBP, 100 mg, Intravenous, Q12H, Mirna Nguyen APRN, Stopped at 02/19/19 0345  •  ferrous sulfate tablet 325 mg, 325 mg, Oral, Daily With Breakfast, Mirna Nguyen APRN, 325 mg at 02/18/19 0756  •  FLUoxetine (PROzac) capsule 40 mg, 40 mg, Oral, Daily, Mirna Nguyen APRN, 40 mg at 02/18/19 0755  •  furosemide (LASIX) tablet 40 mg, 40 mg, Oral, Daily, Adin Kaplan MD  •  glucagon (human recombinant) (GLUCAGEN DIAGNOSTIC) injection 1 mg, 1 mg, Subcutaneous, PRN, Mirna Nguyen APRN  •  HYDROmorphone (DILAUDID) injection 0.5 mg, 0.5 mg, Intravenous, Q15 Min PRN, Larry Verduzco MD, 0.5 mg at 02/17/19 1957  •  insulin lispro (humaLOG) injection 0-14 Units, 0-14 Units, Subcutaneous, 4x Daily With Meals & Nightly, Adin Kaplan MD, 5 Units at 02/18/19 2138  •  ipratropium-albuterol (DUO-NEB) nebulizer solution 3 mL, 3 mL, Nebulization, Q4H PRN, Mirna Nguyen APRN  •  Magnesium Sulfate 2 gram Bolus, followed by 8  gram infusion (total Mg dose 10 grams)- Mg less than or equal to 1mg/dL, 2 g, Intravenous, PRN **OR** Magnesium Sulfate 2 gram / 50mL Infusion (GIVE X 3 BAGS TO EQUAL 6GM TOTAL DOSE) - Mg 1.1 - 1.5 mg/dl, 2 g, Intravenous, PRN, Stopped at 02/19/19 0000 **OR** Magnesium Sulfate 4 gram infusion- Mg 1.6-1.9 mg/dL, 4 g, Intravenous, PRN, Gertrude Solorzano PA-C  •  metoprolol tartrate (LOPRESSOR) tablet 50 mg, 50 mg, Oral, Q12H, Mirna Nguyen APRN, 50 mg at 02/18/19 2138  •  nitroglycerin (NITROSTAT) SL tablet 0.4 mg, 0.4 mg, Sublingual, Q5 Min PRN, Mirna Nguyen APRN  •  pantoprazole (PROTONIX) EC tablet 40 mg, 40 mg, Oral, Q AM, Mirna Nguyen APRN, 40 mg at 02/19/19 0606  •  Pharmacy Consult - MTM, , Does not apply, Daily, Adin Kaplan MD  •  Pharmacy to dose vancomycin, , Does not apply, Continuous PRN, Mirna Nguyen APRN  •  sacubitril-valsartan (ENTRESTO) 49-51 MG tablet 1 tablet, 1 tablet, Oral, Q12H, Adin Kaplan MD  •  sodium chloride 0.9 % flush 10 mL, 10 mL, Intravenous, PRN, Larry Verduzco MD  •  sodium chloride 0.9 % flush 3 mL, 3 mL, Intravenous, Q12H, Mirna Nguyen APRN, 3 mL at 02/18/19 2207  •  sodium chloride 0.9 % flush 3-10 mL, 3-10 mL, Intravenous, PRN, Mirna Nguyen APRN      Assessment/Plan   Assessment / Plan     Active Hospital Problems    Diagnosis Date Noted   • **Takotsubo cardiomyopathy [I51.81] 02/19/2019     Priority: Medium   • Acute respiratory failure with hypoxia and hypercapnia (CMS/HCC) [J96.01, J96.02] 02/18/2019     Priority: Medium   • Community acquired pneumonia [J18.9] 02/18/2019     Priority: Medium   • NSTEMI (non-ST elevated myocardial infarction) (CMS/[facility]) [I21.4] 02/17/2019     Priority: Medium   • Acute systolic right heart failure (CMS/[facility]) [I50.21] 02/19/2019   • Hyperlipidemia [E78.5] 02/18/2019   • Hypertension [I10] 05/14/2017   • Type 2 diabetes mellitus, without long-term current use of insulin (CMS/[facility]) [E11.9] 05/13/2017          Brief  Hospital Course to date:  Micaela Mc is a 68 y.o. female with history of type 2 diabetes, hypertension, hyperlipidemia, morbid obesity presented to the emergency room with complaints of chest discomfort.  Troponins trended upward.  Chest x-ray and CT showed some confluent opacity suggestive of pneumonia versus pulmonary edema.  Treated for non-ST elevation MI and went to Cath Lab which revealed low EF of 20-25% but normal coronary arteries consistent with takosubos cardiomyopathy .      - reviewed [facility] findings.  Consistent with takosubo's cardiomyopathy.  Will d/c cozaar and start entresto per cards recs.  Continue BB.  - will d/c HCTZ and start lasix for diuresis  - lifevest at discharge    - unclear if patient has underlying PNA based on imaging.  Seems clinically more consistent with pulm edema in setting of acute CHF.  WBC 16 on admission but undetectable procalcitonin.  Confounding is 2 of 2 blood cultures with staph (not aureus) with final identification pending.  This seems consistent with contaminant.  Will change abx to PO doxy only to complete 7 day course.  - continue current insulin, a1c=7.7%, home on previous orals    - anticipate home tomorrow with lifevest if ok with cardiology and she is tolerating new meds.    DVT Prophylaxis:  heparin    Disposition: I expect the patient to be discharged home tomorrow.    CODE STATUS:   Code Status and Medical Interventions:   Ordered at: 02/18/19 0056     Code Status:    CPR     Medical Interventions (Level of Support Prior to Arrest):    Full         Electronically signed by Adin Kaplan MD, 02/19/19, 7:44 AM.

## 2019-02-19 NOTE — PLAN OF CARE
Problem: Patient Care Overview  Goal: Plan of Care Review  Outcome: Ongoing (interventions implemented as appropriate)   02/18/19 2200   Coping/Psychosocial   Plan of Care Reviewed With patient   Plan of Care Review   Progress improving   OTHER   Outcome Summary Patient denies pain/chest pain; No signs of bleeding; VSS; Requiring 3L NC to maintain SpO2 > 90%; Will continue to monitor     Goal: Individualization and Mutuality  Outcome: Ongoing (interventions implemented as appropriate)    Goal: Discharge Needs Assessment  Outcome: Ongoing (interventions implemented as appropriate)    Goal: Interprofessional Rounds/Family Conf  Outcome: Ongoing (interventions implemented as appropriate)      Problem: Fall Risk (Adult)  Goal: Identify Related Risk Factors and Signs and Symptoms  Outcome: Ongoing (interventions implemented as appropriate)    Goal: Absence of Fall  Outcome: Ongoing (interventions implemented as appropriate)      Problem: Cardiac: ACS (Acute Coronary Syndrome) (Adult)  Goal: Signs and Symptoms of Listed Potential Problems Will be Absent, Minimized or Managed (Cardiac: ACS)  Outcome: Ongoing (interventions implemented as appropriate)      Problem: ARDS (Acute Resp Distress Syndrome) (Adult)  Goal: Signs and Symptoms of Listed Potential Problems Will be Absent, Minimized or Managed (ARDS)  Outcome: Ongoing (interventions implemented as appropriate)      Problem: Cardiac Catheterization (Diagnostic/Interventional) (Adult)  Goal: Signs and Symptoms of Listed Potential Problems Will be Absent, Minimized or Managed (Cardiac Catheterization)  Outcome: Ongoing (interventions implemented as appropriate)    Goal: Anesthesia/Sedation Recovery  Outcome: Ongoing (interventions implemented as appropriate)

## 2019-02-19 NOTE — PROGRESS NOTES
"  [facility] at [facility]   Inpatient Progress Note       LOS: 2 days   Patient Care Team:  Katya Howard MD as PCP - General  Katya Howard MD as PCP - Family Medicine    Chief Complaint:  Follow-up for cardiomyopathy    Subjective     Interval History:   No current complaints, though has not ambulated yet      Review of Systems:   Pertinent positives noted in history, exam, and assessment. Otherwise reviewed and negative.      Objective     Vitals:  Blood pressure 129/77, pulse 88, temperature 99.8 °F (37.7 °C), resp. rate 18, height 167.6 cm (66\"), weight 110 kg (242 lb), SpO2 98 %, not currently breastfeeding.     Intake/Output Summary (Last 24 hours) at 2/19/2019 0812  Last data filed at 2/19/2019 0345  Gross per 24 hour   Intake 1011.7 ml   Output 450 ml   Net 561.7 ml     Physical Exam   Constitutional: She is oriented to person, place, and time. She appears well-developed and well-nourished.   Neck: No JVD present. No tracheal deviation present.   Cardiovascular: Normal rate, regular rhythm, normal heart sounds and intact distal pulses. Exam reveals no friction rub.   No murmur heard.  Pulmonary/Chest: Effort normal and breath sounds normal.   Abdominal: Bowel sounds are normal. There is no tenderness.   Musculoskeletal: She exhibits no edema or deformity.   Neurological: She is alert and oriented to person, place, and time.     Have examined the patient and agree with the above     Results Review:     I reviewed the patient's new clinical results.    Results from last 7 days   Lab Units 02/19/19  0503   WBC 10*3/mm3 13.51*   HEMOGLOBIN g/dL 12.9   HEMATOCRIT % 40.4   PLATELETS 10*3/mm3 193     Results from last 7 days   Lab Units 02/19/19  0639  02/17/19  1859   SODIUM mmol/L 136   < > 143   POTASSIUM mmol/L 4.4   < > 4.5   CHLORIDE mmol/L 102   < > 104   CO2 mmol/L 28.0   < > 25.0   BUN mg/dL 10   < > 14   CREATININE mg/dL 0.91   < > 0.98   CALCIUM mg/dL 9.1   < > 10.4   BILIRUBIN mg/dL  " --   --  0.5   ALK PHOS U/L  --   --  89   ALT (SGPT) U/L  --   --  55*   AST (SGOT) U/L  --   --  92*   GLUCOSE mg/dL 227*   < > 230*    < > = values in this interval not displayed.     Results from last 7 days   Lab Units 02/19/19  0639   SODIUM mmol/L 136   POTASSIUM mmol/L 4.4   CHLORIDE mmol/L 102   CO2 mmol/L 28.0   BUN mg/dL 10   CREATININE mg/dL 0.91   GLUCOSE mg/dL 227*   CALCIUM mg/dL 9.1     Results from last 7 days   Lab Units 02/17/19  1859   INR  0.98     Lab Results   Lab Value Date/Time    TROPONINI 2.247 (C) 02/18/2019 0551    TROPONINI 0.01 03/16/2016 1250    TROPONINI CANCELED 03/16/2016 1115     Results from last 7 days   Lab Units 02/18/19  0358   TSH mIU/mL 1.797     Results from last 7 days   Lab Units 02/18/19  0358   CHOLESTEROL mg/dL 213*   TRIGLYCERIDES mg/dL 176*   HDL CHOL mg/dL 49   LDL CHOL mg/dL 148*         LHC:  IMPRESSION:  · Normal coronary anatomy  · LVEF 20-25%, with severe mid to distal anterior, apical, and mid to distal inferior hypokinesis, with hypokinetic basal segments consistent with Takotsubo cardiomyopathy.  · LVEDP 18 mmHg    Tele:  SR    Assessment/Plan       Takotsubo cardiomyopathy    Type 2 diabetes mellitus, without long-term current use of insulin (CMS/[facility])    Hypertension    NSTEMI (non-ST elevated myocardial infarction) (CMS/[facility])    Hyperlipidemia    Acute respiratory failure with hypoxia and hypercapnia (CMS/[facility])    Community acquired pneumonia    Acute systolic right heart failure (CMS/[facility])      1. Takotsubo cardiomyopathy         - EF 20-25% by LV gram,normal coronary arteries        - Plan for life vest at discharge       - BB and Entresto and diuretic     2. Acute respiratory failure/PNA:  -Concern for possible right-sided pneumonia, initiated on antibiotics per primary team     3. Hypertension     4. Hyperlipidemia:  - On statin     5. DMII:  - HgA1c 7.7%  - Management per primary team      Plan:  Continue current medications.  Life vest  ordered.  Possible discharge tomorrow if continues to be stable.  Will repeat limited echo in the morning to see if Life vest needed at discharge.   Follow-up in office in 2-3 weeks.    Taylor Blackburn, DIONNE  02/19/19  8:49 AM  I, Chase Iverson MD, personally performed the services described in this documentation as scribed by the above individual in my presence, and it is both accurate and complete    Dictated utilizing Dragon dictation

## 2019-02-20 ENCOUNTER — APPOINTMENT (OUTPATIENT)
Dept: CARDIOLOGY | Facility: HOSPITAL | Age: 69
End: 2019-02-20

## 2019-02-20 VITALS
HEART RATE: 91 BPM | TEMPERATURE: 98 F | SYSTOLIC BLOOD PRESSURE: 121 MMHG | DIASTOLIC BLOOD PRESSURE: 60 MMHG | RESPIRATION RATE: 16 BRPM | BODY MASS INDEX: 38.89 KG/M2 | HEIGHT: 66 IN | OXYGEN SATURATION: 97 % | WEIGHT: 242 LBS

## 2019-02-20 PROBLEM — J18.9 COMMUNITY ACQUIRED PNEUMONIA: Status: RESOLVED | Noted: 2019-02-18 | Resolved: 2019-02-20

## 2019-02-20 PROBLEM — I50.811 ACUTE SYSTOLIC RIGHT HEART FAILURE (HCC): Status: RESOLVED | Noted: 2019-02-19 | Resolved: 2019-02-20

## 2019-02-20 PROBLEM — I21.4 NSTEMI (NON-ST ELEVATED MYOCARDIAL INFARCTION): Status: RESOLVED | Noted: 2019-02-17 | Resolved: 2019-02-20

## 2019-02-20 LAB
GLUCOSE BLDC GLUCOMTR-MCNC: 178 MG/DL (ref 70–130)
GLUCOSE BLDC GLUCOMTR-MCNC: 185 MG/DL (ref 70–130)
GLUCOSE BLDC GLUCOMTR-MCNC: 208 MG/DL (ref 70–130)
GLUCOSE BLDC GLUCOMTR-MCNC: 225 MG/DL (ref 70–130)
L PNEUMO1 AG UR QL IA: NEGATIVE

## 2019-02-20 PROCEDURE — 93308 TTE F-UP OR LMTD: CPT

## 2019-02-20 PROCEDURE — 25010000002 HEPARIN (PORCINE) PER 1000 UNITS: Performed by: INTERNAL MEDICINE

## 2019-02-20 PROCEDURE — 93308 TTE F-UP OR LMTD: CPT | Performed by: INTERNAL MEDICINE

## 2019-02-20 PROCEDURE — 82962 GLUCOSE BLOOD TEST: CPT

## 2019-02-20 PROCEDURE — 99239 HOSP IP/OBS DSCHRG MGMT >30: CPT | Performed by: FAMILY MEDICINE

## 2019-02-20 PROCEDURE — 25010000002 SULFUR HEXAFLUORIDE MICROSPH 60.7-25 MG RECONSTITUTED SUSPENSION: Performed by: NURSE PRACTITIONER

## 2019-02-20 RX ORDER — POTASSIUM CHLORIDE 20 MEQ/1
20 TABLET, EXTENDED RELEASE ORAL DAILY
Qty: 30 TABLET | Refills: 1 | Status: SHIPPED | OUTPATIENT
Start: 2019-02-20 | End: 2019-02-21 | Stop reason: SDUPTHER

## 2019-02-20 RX ORDER — DOXYCYCLINE 100 MG/1
100 CAPSULE ORAL EVERY 12 HOURS SCHEDULED
Status: DISCONTINUED | OUTPATIENT
Start: 2019-02-20 | End: 2019-02-20

## 2019-02-20 RX ORDER — CEFUROXIME AXETIL 500 MG/1
500 TABLET ORAL EVERY 12 HOURS SCHEDULED
Qty: 10 TABLET | Refills: 0 | Status: SHIPPED | OUTPATIENT
Start: 2019-02-20 | End: 2019-02-25

## 2019-02-20 RX ORDER — DOXYCYCLINE 100 MG/1
100 CAPSULE ORAL EVERY 12 HOURS SCHEDULED
Qty: 9 CAPSULE | Refills: 0 | Status: SHIPPED | OUTPATIENT
Start: 2019-02-20 | End: 2019-02-25

## 2019-02-20 RX ORDER — CEFUROXIME AXETIL 250 MG/1
500 TABLET ORAL EVERY 12 HOURS SCHEDULED
Status: DISCONTINUED | OUTPATIENT
Start: 2019-02-20 | End: 2019-02-20 | Stop reason: HOSPADM

## 2019-02-20 RX ORDER — ATORVASTATIN CALCIUM 80 MG/1
80 TABLET, FILM COATED ORAL NIGHTLY
Qty: 30 TABLET | Refills: 1 | Status: SHIPPED | OUTPATIENT
Start: 2019-02-20 | End: 2019-02-21

## 2019-02-20 RX ORDER — FUROSEMIDE 40 MG/1
40 TABLET ORAL DAILY
Qty: 30 TABLET | Refills: 1 | Status: SHIPPED | OUTPATIENT
Start: 2019-02-21 | End: 2019-02-21

## 2019-02-20 RX ORDER — DOXYCYCLINE 100 MG/1
100 CAPSULE ORAL EVERY 12 HOURS SCHEDULED
Status: DISCONTINUED | OUTPATIENT
Start: 2019-02-20 | End: 2019-02-20 | Stop reason: HOSPADM

## 2019-02-20 RX ADMIN — ASPIRIN 81 MG 81 MG: 81 TABLET ORAL at 08:26

## 2019-02-20 RX ADMIN — INSULIN LISPRO 3 UNITS: 100 INJECTION, SOLUTION INTRAVENOUS; SUBCUTANEOUS at 08:27

## 2019-02-20 RX ADMIN — METOPROLOL TARTRATE 50 MG: 50 TABLET ORAL at 08:26

## 2019-02-20 RX ADMIN — Medication 325 MG: at 08:26

## 2019-02-20 RX ADMIN — INSULIN LISPRO 5 UNITS: 100 INJECTION, SOLUTION INTRAVENOUS; SUBCUTANEOUS at 12:35

## 2019-02-20 RX ADMIN — DOXYCYCLINE 100 MG: 100 CAPSULE ORAL at 13:16

## 2019-02-20 RX ADMIN — SACUBITRIL AND VALSARTAN 1 TABLET: 49; 51 TABLET, FILM COATED ORAL at 08:25

## 2019-02-20 RX ADMIN — PANTOPRAZOLE SODIUM 40 MG: 40 TABLET, DELAYED RELEASE ORAL at 05:54

## 2019-02-20 RX ADMIN — FUROSEMIDE 40 MG: 40 TABLET ORAL at 08:26

## 2019-02-20 RX ADMIN — SULFUR HEXAFLUORIDE 2 ML: KIT at 09:50

## 2019-02-20 RX ADMIN — HEPARIN SODIUM 5000 UNITS: 5000 INJECTION INTRAVENOUS; SUBCUTANEOUS at 05:54

## 2019-02-20 RX ADMIN — FLUOXETINE HYDROCHLORIDE 40 MG: 20 CAPSULE ORAL at 08:26

## 2019-02-20 NOTE — PROGRESS NOTES
Case Management Discharge Note    Final Note: Per RN, patient has qualifying O2 sats for excertion.  Spoke with patient at bedside regarding discharge plan and presented DME list to patient.   Patient has chosen to use [facility] in [facility] for her Oxygen.  No other discharge needs verbalized.  Called [facility] [phone] and spoke to Helen who request orders to be faxed to [phone].  Oxygen tank to be delivered to patient room today.  Patient plan is to discharge home via car with family to transport.     Destination      No service has been selected for the patient.      Durable Medical Equipment - Selection Complete      Service Provider Request Status Selected Services Address Phone Number Fax Number    [facility] Selected Durable Medical Equipment [address] [facility] [address] [phone]      Dialysis/Infusion      No service has been selected for the patient.      Home Medical Care      No service has been selected for the patient.      Community Resources      No service has been selected for the patient.             Final Discharge Disposition Code: 01 - home or self-care

## 2019-02-20 NOTE — DISCHARGE SUMMARY
[facility]  DISCHARGE SUMMARY    Patient Name: Micaela Mc  : 1950  MRN: [id number]    Date of Admission: 2019  Date of Discharge: 19    Primary Care Physician: Katya Howard MD    Consults     Date and Time Order Name Status Description    2019 0056 Inpatient Cardiology Consult Completed           Hospital Course     Presenting Problem:   NSTEMI (non-ST elevated myocardial infarction) (CMS/HCC) [I21.4]    Active Hospital Problems    Diagnosis Date Noted   • **Takotsubo cardiomyopathy [I51.81] 2019   • Hyperlipidemia [E78.5] 2019   • Acute respiratory failure with hypoxia and hypercapnia (CMS/HCC) [J96.01, J96.02] 2019   • Hypertension [I10] 2017   • Type 2 diabetes mellitus, without long-term current use of insulin (CMS/HCC) [E11.9] 2017      Resolved Hospital Problems    Diagnosis Date Noted Date Resolved   • Acute systolic right heart failure (CMS/HCC) [I50.21] 2019   • Community acquired pneumonia [J18.9] 2019   • NSTEMI type 2 due to demand mismatch of acute cadiomyopathy (non-ST elevated myocardial infarction) (CMS/HCC) [I21.4] 2019          Hospital Course:  Micaela Mc is a 68 y.o. female with history of type 2 diabetes, hypertension, hyperlipidemia, morbid obesity presented to the emergency room with complaints of chest discomfort.  Troponins trended upward.  Chest x-ray and CT showed some confluent opacity suggestive of pneumonia versus pulmonary edema.  Treated for non-ST elevation MI and went to Cath Lab which revealed low EF of 20-25% but normal coronary arteries consistent with Takosubos cardiomyopathy.  She has been initiated on appropriate therapy including Entresto and initiation of loop diuretic.  She has been fitted for and will be discharged wearing a LifeVest with plans for close cardiology follow-up.  Her community-acquired pneumonia  has responded to empiric antibiotics and will be transitioned to p.o. Ceftin and doxycycline for remaining course at discharge (note: Patient did have an initial blood cultures at time of presentation positive for non-aureus Staphylococcus species which is presumed to be contaminant however final cultures will be followed, patient has been instructed to return to ED if develops fever or rigors but overall has been nontoxic here).        Discharge Follow Up Recommendations for labs/diagnostics:  -PCP within 7-10 days for transition of care  -Dr. Iverson in 2 weeks  -patient has been fitted for and will be wearing a LifeVest upon discharge with close cardiology follow-up    Day of Discharge     HPI:   Still having dyspnea on exertion, nursing noting desats to the upper 80s with activity.  Patient is being evaluated for the need for temporary portable and home O2 prior to discharge.    ROS:  Otherwise ROS is negative except as mentioned in the HPI.    Vital Signs:   Temp:  [97.5 °F (36.4 °C)-98.5 °F (36.9 °C)] 98.5 °F (36.9 °C)  Heart Rate:  [69-92] 84  Resp:  [16-19] 19  BP: ()/(42-66) 136/60     Physical Exam:  Constitutional: No acute distress, awake, alert, nontoxic, obese body habitus  Respiratory: Clear to auscultation bilaterally currently without crackles but with decreased bases due to body habitus, good effort, nonlabored respirations   Cardiovascular: RRR, LifeVest in place  Musculoskeletal: Trace peripheral edema, normal muscle tone for age  Psychiatric: Appropriate affect, good insight and judgement, cooperative  Skin: No rashes, no jaundice, no petechiae, no mottling      Pertinent  and/or Most Recent Results     Results from last 7 days   Lab Units 02/19/19  0639 02/19/19  0503 02/18/19  0358 02/17/19  1859   WBC 10*3/mm3  --  13.51* 16.93* 14.70*   HEMOGLOBIN g/dL  --  12.9 14.9 15.6*   HEMATOCRIT %  --  40.4 45.5* 47.2*   PLATELETS 10*3/mm3  --  193 279 261   SODIUM mmol/L 136  --  138 143    POTASSIUM mmol/L 4.4  --  4.8 4.5   CHLORIDE mmol/L 102  --  106 104   CO2 mmol/L 28.0  --  23.0 25.0   BUN mg/dL 10  --  12 14   CREATININE mg/dL 0.91  --  0.84 0.98   GLUCOSE mg/dL 227*  --  319* 230*   CALCIUM mg/dL 9.1  --  9.6 10.4     Results from last 7 days   Lab Units 02/17/19  1859   BILIRUBIN mg/dL 0.5   ALK PHOS U/L 89   ALT (SGPT) U/L 55*   AST (SGOT) U/L 92*   PROTIME Seconds 12.5   INR  0.98   APTT seconds 30.8*     Results from last 7 days   Lab Units 02/18/19  0358   CHOLESTEROL mg/dL 213*   TRIGLYCERIDES mg/dL 176*   HDL CHOL mg/dL 49     Results from last 7 days   Lab Units 02/18/19  0551 02/18/19  0358 02/17/19  2355  02/17/19  1859   TSH mIU/mL  --  1.797  --   --   --    HEMOGLOBIN A1C %  --  7.70*  --   --   --    BNP pg/mL  --   --   --   --  83.0   TROPONIN I ng/mL 2.247*  --  0.76*   < >  --     < > = values in this interval not displayed.     Brief Urine Lab Results  (Last result in the past 365 days)      Color   Clarity   Blood   Leuk Est   Nitrite   Protein   CREAT   Urine HCG        02/17/19 2254 Yellow Clear Negative Negative Negative Negative               Microbiology Results Abnormal     Procedure Component Value - Date/Time    Legionella Antigen, Urine - Urine, Urine, Clean Catch [[id number]] Collected:  02/18/19 0107    Lab Status:  Final result Specimen:  Urine, Clean Catch Updated:  02/20/19 1311     L. pneumophila Serogp 1 Ur Ag Negative     Comment: Presumptive negative for L. pneumophila serogroup 1 antigen in urine,  suggesting no recent or current infection. Legionnaires' disease  cannot be ruled out since other serogroups and species may also cause  disease.       Narrative:       Performed at:  [address]  [id number]  : Silver Wood MD, Phone:  [phone]    Blood Culture - Blood, Wrist, Right [[id number]]  (Abnormal) Collected:  02/17/19 0569    Lab Status:  Preliminary result Specimen:  Blood from Wrist, Right  Updated:  02/19/19 0822     Blood Culture Abnormal Stain     Gram Stain Anaerobic Bottle Gram positive cocci    Blood Culture ID, PCR - Blood, Wrist, Right [[id number]]  (Abnormal) Collected:  02/17/19 2348    Lab Status:  Final result Specimen:  Blood from Wrist, Right Updated:  02/18/19 2300     BCID, PCR Staphylococcus spp, not aureus. Identification by BCID PCR.    Blood Culture - Blood, Wrist, Right [[id number]]  (Abnormal) Collected:  02/17/19 2348    Lab Status:  Preliminary result Specimen:  Blood from Wrist, Right Updated:  02/18/19 2141     Blood Culture Abnormal Stain     Gram Stain Aerobic Bottle Gram positive cocci in clusters          Imaging Results (all)     Procedure Component Value Units Date/Time    XR Chest 1 View [[id number]] Collected:  02/18/19 0011     Updated:  02/18/19 0144    Narrative:       EXAM:    XR Chest, 1 View     EXAM DATE/TIME:    2/18/2019 12:11 AM     CLINICAL HISTORY:    68 years old, female; Type 2 diabetes mellitus with hyperglycemia; Essential   (primary) hypertension; Non-st elevation (nstemi) myocardial infarction;   Pneumonia, unspecified organism; Acute respiratory failure with hypoxia; Acute   respiratory failure with hypercapnia; Signs and symptoms; Dyspnea; Additional   info: Repeat cxr     TECHNIQUE:    XR of the chest, 1 view.     COMPARISON:    CR XR CHEST 1 VW 2/17/2019 7:11 PM     FINDINGS:     Heart size within normal limits. Moderate mixed interstitial/alveolar opacities   throughout both lungs, confluent in the right lung apex and right lung base,   overall increased from prior study. Appearance is consistent with pulmonary   edema; pneumonia could be coexisting. No visible pneumothorax or pleural   effusion.      Impression:       1. Moderate mixed interstitial/alveolar opacities throughout both lungs,   confluent in the right lung apex and right lung base, overall increased from   prior study. Appearance is consistent with pulmonary edema; pneumonia could be    coexisting.    THIS DOCUMENT HAS BEEN ELECTRONICALLY SIGNED BY ROSSI BERNARDO MD    CT Chest With Contrast [[id number]] Collected:  02/17/19 2129     Updated:  02/17/19 2258    Narrative:       EXAM:    CT Chest With Contrast     EXAM DATE/TIME:    2/17/2019 9:29 PM     CLINICAL HISTORY:    68 years old, female; Abnormal findings; Abnormal radiologic exam of lung or   chest; Patient HX: Abnormal CT abdomen this admit; Rll lung; Additional info:   Rul / rll mass / infiltrate     TECHNIQUE:    Axial computed tomography images of the chest with intravenous contrast.    All CT scans at this facility use at least one of these dose optimization   techniques: automated exposure control; mA and/or kV adjustment per patient   size (includes targeted exams where dose is matched to clinical indication); or   iterative reconstruction.    Coronal and sagittal reformatted images were created and reviewed.     CONTRAST:    70 ml of isovue 300 administered intravenously.     COMPARISON:    CR XR CHEST 1 VW 2/17/2019 7:11 PM     FINDINGS:    Lungs:  Diffuse interstitial thickening, bilaterally. Focal confluent   consolidation in the peripheral aspect of the right upper lobe measuring   approximately 8.5 x 3.4 x 4.4 cm (series 2 image 13 and series 4 image 38).   Redemonstration of the focal opacity in the right lower lobe measuring 3.2 x   2.4 x 3.2 cm (series 235). There are subtle groundglass opacities throughout   both lungs.    Pleural space: Normal. No pneumothorax. No pleural effusion.    Heart:  Negative for right heart strain. Mild cardiomegaly. No pericardial   effusion.    Aorta:  Aorta and arteries have atherosclerotic calcification. No aortic   aneurysm.    Lymph nodes: Unremarkable. No enlarged lymph nodes.    Bones/joints:  Multilevel degenerative spondylosis of the spine. No acute   fracture or dislocation.    Soft tissues: Unremarkable.    Gallbladder and bile ducts:  Redemonstration of a gallstone in the neck with    subtle adjacent fat stranding.    Stomach and bowel:  Status post gastric banding.       Impression:       1. Redemonstration of the focal confluent regions of consolidation in the   peripheral aspect of the right upper lobe (corresponding to the radiographs)   and in the right lower lobe as described. Additional diffuse scattered regions   of groundglass opacities and diffuse interstitial thickening, bilaterally.     Although these findings are nonspecific, consider multilobar pneumonia with   considerations for both typical and atypical etiologies. Please correlate   clinically to exclude the possibility of early acute respiratory distress   syndrome (ARDS). Followup CT of the chest in 3 months is advised to document   resolution and exclude other less likely etiologies.     2. Negative for pulmonary embolism.     THIS DOCUMENT HAS BEEN ELECTRONICALLY SIGNED BY XU BAHENA MD    CT Abdomen Pelvis With Contrast [[id number]] Collected:  02/17/19 1957     Updated:  02/17/19 2128    Narrative:       EXAM:    CT Abdomen and Pelvis With Contrast     EXAM DATE/TIME:    2/17/2019 7:57 PM     CLINICAL HISTORY:    68 years old, female; Pain; Abdominal pain; Additional info: Llq abd pain     TECHNIQUE:    Axial computed tomography images of the abdomen and pelvis with intravenous   contrast.    All CT scans at this facility use at least one of these dose optimization   techniques: automated exposure control; mA and/or kV adjustment per patient   size (includes targeted exams where dose is matched to clinical indication); or   iterative reconstruction.    Coronal reformatted images were created and reviewed.     CONTRAST:    100 ml of  administered intravenously.     COMPARISON:    CT ABD PELVIS WO CONTRAST 3/16/2016 2:18 PM     FINDINGS:    Lower thorax:  Focal groundglass opacity abutting the pleura in the right   lower lobe measures 3 x 2 cm (series 2 image 3). Subtle groundglass opacities   throughout the lung  bases, bilaterally. Diffuse interstitial thickening.     ABDOMEN:    Liver: Normal. No mass.    Gallbladder and bile ducts:  There is a gallstone in the gallbladder neck   measuring up to 1.4 cm (series 2 image 25). There are adjacent subtle   inflammatory changes in the gallbladder neck region. No biliary dilatation.    Pancreas: Normal. No ductal dilation.    Spleen: Normal. No splenomegaly.    Adrenals: Normal. No mass.    Kidneys and ureters: Normal. No hydronephrosis.    Stomach and bowel:  Status post gastric banding. No abnormal dilatation of the   proximal esophagus to the extent imaged. Scattered colonic diverticula without   inflammatory changes to suggest acute diverticulitis. No evidence of intestinal   perforation or obstruction.    Appendix:  Status post appendectomy.     PELVIS:    Bladder:  No calcified stones in the incompletely distended urinary bladder.    Reproductive:  Status post hysterectomy. No adnexal cysts or masses are   identified.     ABDOMEN and PELVIS:    Intraperitoneal space: Normal. No free air. No significant fluid collection.    Bones/joints:  Multilevel degenerative spondylosis of the spine. No acute   fracture or dislocation.    Soft tissues:  Small fat containing umbilical hernia.    Vasculature:  Aorta and arteries have atherosclerotic calcification. No   abdominal aortic aneurysm.    Lymph nodes: Normal. No enlarged lymph nodes.       Impression:       1. Gallstone in the gallbladder neck with subtle djacent inflammatory changes.   Consider further assessment with a right quadrant ultrasound as clinically   warranted if there is concern for acute cholecystitis.   2. Status post gastric banding with no evident complication.   3. Nonspecific focal ground glass opacity abutting the pleura in the right   lower lobe (3 x 2 cm). Although nonspecific, consider pulmonary infection, to   include atypical etiologies, such as fungal or mycobacterium. Malignancy is a   differential  consideration.     Further assessment with a CT of the chest is advised for further assessment of   the right upper lung opacity seen on the chest radiograph from the same day.     4. Additionally, there are subtle groundglass opacities of both lung bases and   interstitial thickening, nonspecific findings.   5. Colonic diverticulosis without diverticulitis.   6. Additional chronic findings as described.     THIS DOCUMENT HAS BEEN ELECTRONICALLY SIGNED BY XU BAHENA MD    XR Chest 1 View [[id number]] Collected:  02/17/19 1901     Updated:  02/17/19 2113    Narrative:       EXAM:    XR Chest, 1 View     EXAM DATE/TIME:    2/17/2019 7:01 PM     CLINICAL HISTORY:    68 years old, female; Pain; Other: See notes; Additional info: Upper abdominal   pain triage protocol     TECHNIQUE:    XR of the chest, 1 view.     COMPARISON:    No relevant prior studies available.     FINDINGS:    Lungs:  There is right lower lobe opacity at the level of the diaphragm which   is seen greater advantage on the comparison CT. There is an additional focal   opacity the peripheral aspect of the right upper lung located at the level of   the peripheral third-fifth ribs abutting the pleura, a nonspecific finding.    Pleural space:  No pleural effusion or pneumothorax.    Heart/Mediastinum:  There is mild cardiomegaly and pericardial lipomatosis as   seen on the CT from the same date.    Bones/joints: Unremarkable.       Impression:       Nonspecific right lower lobe opacity at the level of the diaphragm which is   seen to greater advantage of comparison CT and additional focal opacity at the   peripheral aspect of the right upper lung as described. Although nonspecific   findings, consider pulmonary infection or malignancy. Further assessment with a   CT of the chest is advised.     THIS DOCUMENT HAS BEEN ELECTRONICALLY SIGNED BY XU BAHENA MD                    Results for orders placed during the hospital encounter of 02/17/19   Adult  Transthoracic Echo Complete W/ Cont if Necessary Per Protocol    Narrative · Estimated EF appears to be in the range of 21 - 25%  · Left ventricular systolic function is severely decreased.  · The following left ventricular wall segments are hypokinetic: mid   anterior, mid anterolateral, mid inferoseptal and mid anteroseptal. The   following left ventricular wall segments are akinetic: apical anterior,   apical lateral, apical inferior, apical septal and apex.  · Left ventricular diastolic dysfunction (grade II) consistent with   pseudonormalization.  · No significant valvular abnormality.  · Calculated right ventricular systolic pressure from tricuspid   regurgitation is 33 mmHg.           Order Current Status    Blood Culture - Blood, Wrist, Right Preliminary result    Blood Culture - Blood, Wrist, Right Preliminary result        Discharge Details        Discharge Medications      New Medications      Instructions Start Date   atorvastatin 80 MG tablet  Commonly known as:  LIPITOR   80 mg, Oral, Nightly      cefuroxime 500 MG tablet  Commonly known as:  CEFTIN   500 mg, Oral, Every 12 Hours Scheduled      doxycycline 100 MG capsule  Commonly known as:  MONODOX   100 mg, Oral, Every 12 Hours Scheduled      furosemide 40 MG tablet  Commonly known as:  LASIX   40 mg, Oral, Daily      potassium chloride ER 20 MEQ tablet controlled-release ER tablet  Commonly known as:  K-TAB   20 mEq, Oral, Daily         Continue These Medications      Instructions Start Date   aspirin 81 MG EC tablet   81 mg, Oral, Daily      CALCIUM 1000 + D PO   1,000 mg, Oral, Daily      ferrous sulfate 324 (65 Fe) MG tablet delayed-release EC tablet   324 mg, Oral, Daily With Breakfast      FLUoxetine 20 MG capsule  Commonly known as:  PROzac   40 mg, Oral, Daily      glimepiride 1 MG tablet  Commonly known as:  AMARYL   1 mg, Oral, Every Morning Before Breakfast      hyoscyamine 0.125 MG SL tablet  Commonly known as:  LEVSIN   0.125 mg, Oral,  Every 4 Hours PRN      lansoprazole 30 MG capsule  Commonly known as:  PREVACID   30 mg, Oral, Daily      metFORMIN 500 MG tablet  Commonly known as:  GLUCOPHAGE   500 mg, Oral, 2 Times Daily With Meals      metoprolol tartrate 50 MG tablet  Commonly known as:  LOPRESSOR   50 mg, Oral, 2 Times Daily         Stop These Medications    atenolol 50 MG tablet  Commonly known as:  TENORMIN     losartan-hydrochlorothiazide 100-25 MG per tablet  Commonly known as:  HYZAAR     pantoprazole 40 MG EC tablet  Commonly known as:  PROTONIX     simvastatin 20 MG tablet  Commonly known as:  ZOCOR            Allergies   Allergen Reactions   • Codeine Itching         Discharge Disposition:  Home or Self Care    Discharge Diet:  Diet Order   Procedures   • Diet Regular; Cardiac       Discharge Activity:   Activity Instructions     Activity as Tolerated            CODE STATUS:    Code Status and Medical Interventions:   Ordered at: 02/18/19 0056     Code Status:    CPR     Medical Interventions (Level of Support Prior to Arrest):    Full       Future Appointments   Date Time Provider Department Center   3/6/2019 10:15 AM Chase Iverson MD [facility] None       Additional Instructions for the Follow-ups that You Need to Schedule     Discharge Follow-up with PCP   As directed       Currently Documented PCP:    Katya Howard MD    PCP Phone Number:    [phone]     Follow Up Details:  within 7-10 days for Transition of Care visit         Discharge Follow-up with Specialty: Zayra/Shanice Blackburn; 2 Weeks   As directed      Specialty:  Gemma Blackburn    Follow Up:  2 Weeks                 Time Spent on Discharge:  40 minutes    Electronically signed by Kimber Polo MD, 02/20/19, 2:00 PM.

## 2019-02-20 NOTE — NURSING NOTE
Pt. Referred for Phase II Cardiac Rehab. Staff discussed benefits of exercise, program protocol, and educational material provided. Teach back verified.  Permission granted from patient for staff to fax referral information to outlying program at this time.  Staff to fax referral info to [facility].

## 2019-02-20 NOTE — PLAN OF CARE
Problem: Patient Care Overview  Goal: Plan of Care Review  Outcome: Ongoing (interventions implemented as appropriate)   02/20/19 2261   OTHER   Outcome Summary No c/o chest pain. Life Vest in place. Plan for d/c with Life Vest in am. VSS. Will continue to monitor.

## 2019-02-20 NOTE — DISCHARGE INSTR - APPOINTMENTS
You have an appointment with Katya Howard MD  on February 27, 2019 @ 11:20 AM.   Call them if you have any questions. Phone: [phone] [address] [id number]

## 2019-02-20 NOTE — DISCHARGE PLACEMENT REQUEST
"SOHAN Estevez, RN  Case Management  [phone]    Gloria Mc (68 y.o. Female)     Date of Birth Social Security Number Address Home Phone MRN    1950  [facility] [address] [facility] [address]  [id number]    Faith Marital Status          Jewish        Admission Date Admission Type Admitting Provider Attending Provider Department, Room/Bed    19 Emergency Kimber Polo MD Hall, Holly, MD [facility] 6B, N636/1    Discharge Date Discharge Disposition Discharge Destination                       Attending Provider:  Kimber Polo MD    Allergies:  Codeine    Isolation:  None   Infection:  None   Code Status:  CPR    Ht:  167.6 cm (65.98\")   Wt:  110 kg (242 lb)    Admission Cmt:  None   Principal Problem:  Takotsubo cardiomyopathy [I51.81]                 Active Insurance as of 2019     Primary Coverage     Payor Plan Insurance Group Employer/Plan Group    HUMANA MEDICARE REPLACEMENT HUMANA MEDICARE REPL [id number]     Payor Plan Address Payor Plan Phone Number Payor Plan Fax Number Effective Dates    [address] [address] [phone]  2018 - None Entered    [facility] [address]       Subscriber Name Subscriber Birth Date Member ID       GLORIA MC 1950 [id number]                 Emergency Contacts      (Rel.) Home Phone Work Phone Mobile Phone    Ruddy Mc (Spouse) -- -- [phone]            Insurance Information                HUMANA MEDICARE REPLACEMENT/HUMANA MEDICARE REPL Phone: [phone]    Subscriber: Gloria Mc Subscriber#: [id number]    Group#: [id number] Precert#:              History & Physical      Clifford Llanes MD at 2019 12:15 AM              [facility] Medicine Services  HISTORY AND PHYSICAL    Patient Name: Gloria Mc  : 1950  MRN: [id number]  Primary Care Physician: Katya Howard MD  Date of admission: 2019      Subjective   Subjective "     Chief Complaint:  Abdominal pain, nausea     HPI:  Micaela Mc is a 68 y.o. female with PMH significant for anemia, GERD, DM2, HTn, and HLD that presents to the ED with complaint of acute onset abdominal pain and nausea with diaphoresis.   She states that she went to [employer] this morning and was not feeling well when she came home. She notes that she abdominal pain in her LUQ and lower chest. She reports having a loose stool, but no vomiting although she reports she felt she needed to vomiting. She did have associated chills and diaphoresis. She denies any fever or cough.   Upon arrival to the ED she was stable. CT chest was obtained secondary to concerning areas found on CXR. Upon return from CT, she was found to be pale, diaphoretic, and semi-conscious with oxygen saturation around 75% on room air which rebounded on 100% non-rebreather. ABG was obtained and found to have hypoxia and hypercapnia and pt was ultimately placed on BiPAP.  CT chest was concerning for multifocal pneumonia. She is also found to have concern for NSTEMI.   She will be admitted to Hospital Medicine for further evaluation.     Review of Systems   Constitutional: Positive for appetite change, chills, diaphoresis and fatigue. Negative for activity change and fever.   HENT: Negative.    Eyes: Negative for visual disturbance.   Respiratory: Positive for shortness of breath. Negative for cough, chest tightness and wheezing.    Cardiovascular: Positive for chest pain. Negative for palpitations and leg swelling.   Gastrointestinal: Positive for abdominal pain, diarrhea and nausea. Negative for constipation and vomiting.   Genitourinary: Negative for difficulty urinating, dysuria, frequency and urgency.   Musculoskeletal: Negative for arthralgias and myalgias.   Skin: Negative for color change, pallor and rash.   Neurological: Negative for dizziness, weakness and headaches.   Psychiatric/Behavioral: Negative for confusion. The patient is not  nervous/anxious.         Otherwise complete ROS reviewed and is negative except as mentioned in the HPI.    Personal History     Past Medical History:   Diagnosis Date   • Anemia    • Diabetes mellitus (CMS/HCC)    • GERD (gastroesophageal reflux disease)    • Hyperlipidemia    • Hypertension        Past Surgical History:   Procedure Laterality Date   • APPENDECTOMY     • BREAST CYST ASPIRATION  1999    pt doesn't remember which breast   • ENDOSCOPY N/A 5/15/2017    Procedure: ESOPHAGOGASTRODUODENOSCOPY;  Surgeon: Jennifer Ingram MD;  Location: [facility] ENDOSCOPY;  Service:    • HYSTERECTOMY      age 41   • LAPAROSCOPIC GASTRIC BANDING         Family History: family history is not on file. Otherwise pertinent FHx was reviewed and unremarkable.     Social History:  reports that  has never smoked. she has never used smokeless tobacco. She reports that she drinks alcohol. She reports that she does not use drugs.  Social History     Social History Narrative   • Not on file       Medications:    Available home medication information reviewed.    (Not in a hospital admission)    Allergies   Allergen Reactions   • Codeine Itching       Objective   Objective     Vital Signs:   Temp:  [97.5 °F (36.4 °C)] 97.5 °F (36.4 °C)  Heart Rate:  [72-88] 88  Resp:  [16-24] 21  BP: ()/() 158/91  FiO2 (%):  [40 %] 40 %        Physical Exam   Constitutional: She is oriented to person, place, and time. She appears well-developed and well-nourished. No distress.   HENT:   Head: Normocephalic.   Eyes: Pupils are equal, round, and reactive to light.   Neck: Normal range of motion. Neck supple. No JVD present.   Cardiovascular: Normal rate, regular rhythm, normal heart sounds and intact distal pulses. Exam reveals no gallop and no friction rub.   No murmur heard.  Pulmonary/Chest: Effort normal. She has no wheezes. She has no rales.   BiPAP in place during exam. Coarse lung sounds throughout    Abdominal: Soft. Bowel sounds are normal.  She exhibits no distension and no mass. There is no tenderness. There is no guarding.   Musculoskeletal: Normal range of motion. She exhibits no edema or tenderness.   Neurological: She is alert and oriented to person, place, and time.   Skin: Skin is warm and dry. No erythema. No pallor.   Psychiatric: She has a normal mood and affect. Her behavior is normal. Thought content normal.   Vitals reviewed.       Results Reviewed:  I have personally reviewed current lab, radiology, and data and agree.    Results from last 7 days   Lab Units 02/17/19  1859   WBC 10*3/mm3 14.70*   HEMOGLOBIN g/dL 15.6*   HEMATOCRIT % 47.2*   PLATELETS 10*3/mm3 261   INR  0.98     Results from last 7 days   Lab Units 02/17/19  2355  02/17/19 1859   SODIUM mmol/L  --   --  143   POTASSIUM mmol/L  --   --  4.5   CHLORIDE mmol/L  --   --  104   CO2 mmol/L  --   --  25.0   BUN mg/dL  --   --  14   CREATININE mg/dL  --   --  0.98   GLUCOSE mg/dL  --   --  230*   CALCIUM mg/dL  --   --  10.4   ALT (SGPT) U/L  --   --  55*   AST (SGOT) U/L  --   --  92*   TROPONIN I ng/mL 0.76*   < >  --     < > = values in this interval not displayed.     Estimated Creatinine Clearance: 66.9 mL/min (by C-G formula based on SCr of 0.98 mg/dL).  Brief Urine Lab Results  (Last result in the past 365 days)      Color   Clarity   Blood   Leuk Est   Nitrite   Protein   CREAT   Urine HCG        02/17/19 2254 Yellow Clear Negative Negative Negative Negative             No results found for: BNP  Imaging Results (last 24 hours)     Procedure Component Value Units Date/Time    CT Chest With Contrast [[id number]] Collected:  02/17/19 2129     Updated:  02/17/19 2258    Narrative:       EXAM:    CT Chest With Contrast     EXAM DATE/TIME:    2/17/2019 9:29 PM     CLINICAL HISTORY:    68 years old, female; Abnormal findings; Abnormal radiologic exam of lung or   chest; Patient HX: Abnormal CT abdomen this admit; Rll lung; Additional info:   Rul / rll mass / infiltrate      TECHNIQUE:    Axial computed tomography images of the chest with intravenous contrast.    All CT scans at this facility use at least one of these dose optimization   techniques: automated exposure control; mA and/or kV adjustment per patient   size (includes targeted exams where dose is matched to clinical indication); or   iterative reconstruction.    Coronal and sagittal reformatted images were created and reviewed.     CONTRAST:    70 ml of isovue 300 administered intravenously.     COMPARISON:    CR XR CHEST 1 VW 2/17/2019 7:11 PM     FINDINGS:    Lungs:  Diffuse interstitial thickening, bilaterally. Focal confluent   consolidation in the peripheral aspect of the right upper lobe measuring   approximately 8.5 x 3.4 x 4.4 cm (series 2 image 13 and series 4 image 38).   Redemonstration of the focal opacity in the right lower lobe measuring 3.2 x   2.4 x 3.2 cm (series 235). There are subtle groundglass opacities throughout   both lungs.    Pleural space: Normal. No pneumothorax. No pleural effusion.    Heart:  Negative for right heart strain. Mild cardiomegaly. No pericardial   effusion.    Aorta:  Aorta and arteries have atherosclerotic calcification. No aortic   aneurysm.    Lymph nodes: Unremarkable. No enlarged lymph nodes.    Bones/joints:  Multilevel degenerative spondylosis of the spine. No acute   fracture or dislocation.    Soft tissues: Unremarkable.    Gallbladder and bile ducts:  Redemonstration of a gallstone in the neck with   subtle adjacent fat stranding.    Stomach and bowel:  Status post gastric banding.       Impression:       1. Redemonstration of the focal confluent regions of consolidation in the   peripheral aspect of the right upper lobe (corresponding to the radiographs)   and in the right lower lobe as described. Additional diffuse scattered regions   of groundglass opacities and diffuse interstitial thickening, bilaterally.     Although these findings are nonspecific, consider  multilobar pneumonia with   considerations for both typical and atypical etiologies. Please correlate   clinically to exclude the possibility of early acute respiratory distress   syndrome (ARDS). Followup CT of the chest in 3 months is advised to document   resolution and exclude other less likely etiologies.     2. Negative for pulmonary embolism.     THIS DOCUMENT HAS BEEN ELECTRONICALLY SIGNED BY XU BAHENA MD    CT Abdomen Pelvis With Contrast [[id number]] Collected:  02/17/19 1957     Updated:  02/17/19 2128    Narrative:       EXAM:    CT Abdomen and Pelvis With Contrast     EXAM DATE/TIME:    2/17/2019 7:57 PM     CLINICAL HISTORY:    68 years old, female; Pain; Abdominal pain; Additional info: Llq abd pain     TECHNIQUE:    Axial computed tomography images of the abdomen and pelvis with intravenous   contrast.    All CT scans at this facility use at least one of these dose optimization   techniques: automated exposure control; mA and/or kV adjustment per patient   size (includes targeted exams where dose is matched to clinical indication); or   iterative reconstruction.    Coronal reformatted images were created and reviewed.     CONTRAST:    100 ml of  administered intravenously.     COMPARISON:    CT ABD PELVIS WO CONTRAST 3/16/2016 2:18 PM     FINDINGS:    Lower thorax:  Focal groundglass opacity abutting the pleura in the right   lower lobe measures 3 x 2 cm (series 2 image 3). Subtle groundglass opacities   throughout the lung bases, bilaterally. Diffuse interstitial thickening.     ABDOMEN:    Liver: Normal. No mass.    Gallbladder and bile ducts:  There is a gallstone in the gallbladder neck   measuring up to 1.4 cm (series 2 image 25). There are adjacent subtle   inflammatory changes in the gallbladder neck region. No biliary dilatation.    Pancreas: Normal. No ductal dilation.    Spleen: Normal. No splenomegaly.    Adrenals: Normal. No mass.    Kidneys and ureters: Normal. No  hydronephrosis.    Stomach and bowel:  Status post gastric banding. No abnormal dilatation of the   proximal esophagus to the extent imaged. Scattered colonic diverticula without   inflammatory changes to suggest acute diverticulitis. No evidence of intestinal   perforation or obstruction.    Appendix:  Status post appendectomy.     PELVIS:    Bladder:  No calcified stones in the incompletely distended urinary bladder.    Reproductive:  Status post hysterectomy. No adnexal cysts or masses are   identified.     ABDOMEN and PELVIS:    Intraperitoneal space: Normal. No free air. No significant fluid collection.    Bones/joints:  Multilevel degenerative spondylosis of the spine. No acute   fracture or dislocation.    Soft tissues:  Small fat containing umbilical hernia.    Vasculature:  Aorta and arteries have atherosclerotic calcification. No   abdominal aortic aneurysm.    Lymph nodes: Normal. No enlarged lymph nodes.       Impression:       1. Gallstone in the gallbladder neck with subtle djacent inflammatory changes.   Consider further assessment with a right quadrant ultrasound as clinically   warranted if there is concern for acute cholecystitis.   2. Status post gastric banding with no evident complication.   3. Nonspecific focal ground glass opacity abutting the pleura in the right   lower lobe (3 x 2 cm). Although nonspecific, consider pulmonary infection, to   include atypical etiologies, such as fungal or mycobacterium. Malignancy is a   differential consideration.     Further assessment with a CT of the chest is advised for further assessment of   the right upper lung opacity seen on the chest radiograph from the same day.     4. Additionally, there are subtle groundglass opacities of both lung bases and   interstitial thickening, nonspecific findings.   5. Colonic diverticulosis without diverticulitis.   6. Additional chronic findings as described.     THIS DOCUMENT HAS BEEN ELECTRONICALLY SIGNED BY XU  SHRUTI BROWN    XR Chest 1 View [[id number]] Collected:  19     Updated:  19    Narrative:       EXAM:    XR Chest, 1 View     EXAM DATE/TIME:    2019 7:01 PM     CLINICAL HISTORY:    68 years old, female; Pain; Other: See notes; Additional info: Upper abdominal   pain triage protocol     TECHNIQUE:    XR of the chest, 1 view.     COMPARISON:    No relevant prior studies available.     FINDINGS:    Lungs:  There is right lower lobe opacity at the level of the diaphragm which   is seen greater advantage on the comparison CT. There is an additional focal   opacity the peripheral aspect of the right upper lung located at the level of   the peripheral third-fifth ribs abutting the pleura, a nonspecific finding.    Pleural space:  No pleural effusion or pneumothorax.    Heart/Mediastinum:  There is mild cardiomegaly and pericardial lipomatosis as   seen on the CT from the same date.    Bones/joints: Unremarkable.       Impression:       Nonspecific right lower lobe opacity at the level of the diaphragm which is   seen to greater advantage of comparison CT and additional focal opacity at the   peripheral aspect of the right upper lung as described. Although nonspecific   findings, consider pulmonary infection or malignancy. Further assessment with a   CT of the chest is advised.     THIS DOCUMENT HAS BEEN ELECTRONICALLY SIGNED BY XU BAHENA MD        Results for orders placed during the hospital encounter of 01/21/15   CONVERTED (HISTORICAL) ECHO    Narrative Patient:      GLORIA GILLIAM    [facility] Rec#:     [id number]               :          1950            Date:         2015            Age:          64y                   Height:       167.64 cm / 66.0 in  Weight:       107.05 kg / 235.9 lbs  Sex:          F                     BSA:          2.15  Room#:        op                        Sonographer:  Gina Saldana BS,RDCS,RVS  Referring:    CORRINE  Reading:      Chase  Zayra BROWN  Primary:      HENMELISSAMARYH  ______________________________________________________________________    Transthoracic Echocardiogram    Indication:  HTN  BP:           136/64  HR:           57    Conclusions  1. The estimated ejection fraction is 55-60%.   2. All valves are structurally and functionally normal with no  hemodynamically significant valve disease.     Findings       Technical Comments:  The study quality is fair.      Left Ventricle:  The left ventricular chamber size is normal. The estimated ejection  fraction is 55-60%.      Left Atrium:  The left atrium is mildly dilated.     Right Ventricle:  The right ventricle is mildly dilated.  The right ventricular global  systolic function is normal.     Right Atrium:  The right atrial cavity size is normal. No atrial septal defect is  visualized.     Aortic Valve:  The aortic valve is trileaflet. Mild aortic leaflet calcification is  visualized. There is no evidence of aortic regurgitation. There is no  evidence of aortic stenosis.     Mitral Valve:  The mitral valve leaflets appear normal. There is no evidence of mitral  valve prolapse. There is mild mitral regurgitation.  There is no  evidence of mitral stenosis.     Tricuspid Valve:  The tricuspid valve leaflets are normal.  There is mild tricuspid  regurgitation.     Pulmonic Valve:  The pulmonic valve appears normal. There is a trace pulmonic  regurgitation.      Pericardium:  There is no evidence of pericardial effusion.     Aorta:  There is no dilatation of the aortic root.     Pulmonary Artery:  The main pulmonary artery appears normal.     Venous:  The venous system appears normal.     Measurements   Chambers  Name                    Value           RVIDd (AP) 2D           2.92 cm         IVSd (2D)               0.92 cm         LVIDd (2D)              5.17 cm         LVIDs (2D)              3.35 cm         LVPWd (2D)              1 cm            Ao root diameter (2D)   2.6 cm          LA  dimension (AP) 2D    4.7 cm          LA:Ao ratio (2D)        1.81 ratio        Diastolic/Systolic Function  Name                    Value           MV E-wave Vmax          0.85 m/sec      MV A-wave Vmax          0.91 m/sec      MV E:A ratio            0.9 ratio       LV lateral e' Vmax      0.05 m/sec      LV E:e' lateral ratio   16.5 ratio        Aortic Valve  Name                    Value           AV Vmax                 1.8 m/sec       AV VTI                  35.9 cm         AV peak gradient        13 mmHg         AV mean gradient        6 mmHg          LVOT Vmax               0.89 m/sec      LVOT VTI                21.7 cm         LVOT peak gradient      3 mmHg          LVOT mean gradient      2 mmHg            Pulmonic Valve/Qp:Qs  Name                    Value           PV Vmax                 1.18 m/sec      PV peak gradient        6 mmHg          PV acceleration time    127 msec          Electronically signed by: Chase Iverson MD on 01/22/2015 17:42:25       Assessment/Plan   Assessment / Plan     Active Hospital Problems    Diagnosis Date Noted   • **Acute respiratory failure with hypoxia and hypercapnia (CMS/HCC) [J96.01, J96.02] 02/18/2019   • Hyperlipidemia [E78.5] 02/18/2019   • Community acquired pneumonia of right lung [J18.9] 02/18/2019   • NSTEMI (non-ST elevated myocardial infarction) (CMS/HCC) [I21.4] 02/17/2019   • Hypertension [I10] 05/14/2017   • GERD (gastroesophageal reflux disease) [K21.9] 05/13/2017   • Type 2 diabetes mellitus (CMS/HCC) [E11.9] 05/13/2017     68 year old female presenting to the ED with atypical presentation of LUQ abdominal pain/ lower chest pain who is found to have NSTEMI as well as CT concerning for right lobe pneumonia     Acute Respiratory Failure/ Right Lobe PNA  -BiPAP in place for now, wean as appropriate  -Rocephin and Azithromycin started in ED, will continue Rocephin and change to doxycycline   -PNA antigens  -PRN nebs   -Lactic acid, procal  pending   -CBC,  BMP in am     NSTEMI  -Heparin gtt started in ED  -trend troponin   -Trend EKG  -BNP pending   -ASA load given in ED, will continue daily   -TSH, Lipid panel, CBC, BMP in am     Diabetes Mellitus 2  -FSBG ACHS  -SS insulin   -HgA1c in am     GERD  -continue home medication    Hypertension   -continue home medication       DVT prophylaxis:  Heparin gtt     CODE STATUS:    There are no questions and answers to display.       Admission Status:  I believe this patient meets INPATIENT status due to the need for care which can only be reasonably provided in an hospital setting such as possible need for aggressive/expedited ancillary services and/or consultation services, IV medications, close physician monitoring, and/or procedures.  In such, I feel patient’s risk for adverse outcomes and need for care warrant INPATIENT evaluation and predict the patient’s care encounter to likely last beyond 2 midnights.    Electronically signed by DIONNE Hawk, 02/18/2019    Brief Attending Admission Attestation     I have seen and examined the patient, performing an independent face-to-face diagnostic evaluation with plan of care reviewed and developed with the advanced practice clinician (APC).      Brief Summary Statement:   Micaela Mc is a 68 y.o. female with history of type 2 diabetes mellitus, morbid obesity, hypertension, obstructive sleep apnea but noncompliant with CPAP at home.  Patient presents to the ER with complaint of left upper abdominal pain/chest discomfort.  Her symptoms started several hours ago, associated with nausea but no emesis.  In addition, patient reports some chills and diaphoresis.  She denies prior history of coronary artery disease.  No vomiting, dysuria, urinary frequency, dizziness or syncope.  Patient noted to be hypoxic in the ER.  She was started on BiPAP and ABG showed pH of 7.28 with PCO2 of 46.2 and PO2 of 65.2.  Troponin is 0.23 but EKG shows normal ST elevation.  Chest CT with IV  contrast reports some focal confluent regions of consolidation in the peripheral aspect of the right upper lobe and in the right lower lobe.  There are diffuse scattered regions of ground glass opacities and diffuse interstitial thickening bilaterally.  Findings are consistent with possible multilobar pneumonia.  Patient was started on IV antibiotics for community-acquired pneumonia.  We'll continue Rocephin and IV doxycycline.  We'll check mycoplasma antibodies as well as Legionella urine antigens.  Patient is currently on BiPAP and will hold off as symptoms improve.  Remainder of detailed HPI is as noted above and has been reviewed and/or edited by me for completeness.      Attending Physical Exam:  Constitutional: No acute distress, awake, alert  Eyes: PERRLA, sclerae anicteric, no conjunctival injection  HENT: NCAT, mucous membranes moist  Neck: Supple, no thyromegaly, no lymphadenopathy, trachea midline  Respiratory: Clear to auscultation bilaterally, nonlabored respirations   Cardiovascular: RRR, no murmurs, rubs, or gallops, palpable pedal pulses bilaterally  Gastrointestinal: Positive bowel sounds, soft, nontender, nondistended  Musculoskeletal: No bilateral ankle edema, no clubbing or cyanosis to extremities  Psychiatric: Appropriate affect, cooperative  Neurologic: Oriented x 3, strength symmetric in all extremities, Cranial Nerves grossly intact, speech clear  Skin: No rashes      Brief Assessment/Plan :  See above for further detailed assessment and plan developed with APC which I have reviewed and/or edited for completeness.      Electronically signed by Clifford Llanes MD, 02/18/19, 12:20 AM.         Electronically signed by Clifford Llanes MD at 2/18/2019  3:29 AM          Physician Progress Notes (most recent note)      Chase Iverson MD at 2/19/2019  8:48 AM            [facility]   Inpatient Progress Note       LOS: 2 days   Patient Care Team:  Katya Howard MD  "as PCP - General  Katya Howard MD as PCP - Family Medicine    Chief Complaint:  Follow-up for cardiomyopathy    Subjective     Interval History:   No current complaints, though has not ambulated yet      Review of Systems:   Pertinent positives noted in history, exam, and assessment. Otherwise reviewed and negative.      Objective     Vitals:  Blood pressure 129/77, pulse 88, temperature 99.8 °F (37.7 °C), resp. rate 18, height 167.6 cm (66\"), weight 110 kg (242 lb), SpO2 98 %, not currently breastfeeding.     Intake/Output Summary (Last 24 hours) at 2/19/2019 0849  Last data filed at 2/19/2019 0345  Gross per 24 hour   Intake 1011.7 ml   Output 450 ml   Net 561.7 ml     Physical Exam   Constitutional: She is oriented to person, place, and time. She appears well-developed and well-nourished.   Neck: No JVD present. No tracheal deviation present.   Cardiovascular: Normal rate, regular rhythm, normal heart sounds and intact distal pulses. Exam reveals no friction rub.   No murmur heard.  Pulmonary/Chest: Effort normal and breath sounds normal.   Abdominal: Bowel sounds are normal. There is no tenderness.   Musculoskeletal: She exhibits no edema or deformity.   Neurological: She is alert and oriented to person, place, and time.     Have examined the patient and agree with the above     Results Review:     I reviewed the patient's new clinical results.    Results from last 7 days   Lab Units 02/19/19  0503   WBC 10*3/mm3 13.51*   HEMOGLOBIN g/dL 12.9   HEMATOCRIT % 40.4   PLATELETS 10*3/mm3 193     Results from last 7 days   Lab Units 02/19/19  0639  02/17/19  1859   SODIUM mmol/L 136   < > 143   POTASSIUM mmol/L 4.4   < > 4.5   CHLORIDE mmol/L 102   < > 104   CO2 mmol/L 28.0   < > 25.0   BUN mg/dL 10   < > 14   CREATININE mg/dL 0.91   < > 0.98   CALCIUM mg/dL 9.1   < > 10.4   BILIRUBIN mg/dL  --   --  0.5   ALK PHOS U/L  --   --  89   ALT (SGPT) U/L  --   --  55*   AST (SGOT) U/L  --   --  92*   GLUCOSE mg/dL 227*   " < > 230*    < > = values in this interval not displayed.     Results from last 7 days   Lab Units 02/19/19  0639   SODIUM mmol/L 136   POTASSIUM mmol/L 4.4   CHLORIDE mmol/L 102   CO2 mmol/L 28.0   BUN mg/dL 10   CREATININE mg/dL 0.91   GLUCOSE mg/dL 227*   CALCIUM mg/dL 9.1     Results from last 7 days   Lab Units 02/17/19  1859   INR  0.98     Lab Results   Lab Value Date/Time    TROPONINI 2.247 (C) 02/18/2019 0551    TROPONINI 0.01 03/16/2016 1250    TROPONINI CANCELED 03/16/2016 1115     Results from last 7 days   Lab Units 02/18/19  0358   TSH mIU/mL 1.797     Results from last 7 days   Lab Units 02/18/19  0358   CHOLESTEROL mg/dL 213*   TRIGLYCERIDES mg/dL 176*   HDL CHOL mg/dL 49   LDL CHOL mg/dL 148*         LHC:  IMPRESSION:  · Normal coronary anatomy  · LVEF 20-25%, with severe mid to distal anterior, apical, and mid to distal inferior hypokinesis, with hypokinetic basal segments consistent with Takotsubo cardiomyopathy.  · LVEDP 18 mmHg    Tele:  SR    Assessment/Plan       Takotsubo cardiomyopathy    Type 2 diabetes mellitus, without long-term current use of insulin (CMS/[facility])    Hypertension    NSTEMI (non-ST elevated myocardial infarction) (CMS/[facility])    Hyperlipidemia    Acute respiratory failure with hypoxia and hypercapnia (CMS/[facility])    Community acquired pneumonia    Acute systolic right heart failure (CMS/[facility])      1. Takotsubo cardiomyopathy         - EF 20-25% by LV gram,normal coronary arteries        - Plan for life vest at discharge       - BB and Entresto and diuretic     2. Acute respiratory failure/PNA:  -Concern for possible right-sided pneumonia, initiated on antibiotics per primary team     3. Hypertension     4. Hyperlipidemia:  - On statin     5. DMII:  - HgA1c 7.7%  - Management per primary team      Plan:  Continue current medications.  Life vest ordered.  Possible discharge tomorrow if continues to be stable.  Will repeat limited echo in the morning to see if Life vest needed at  discharge.   Follow-up in office in 2-3 weeks.    Taylor Blackburn, DIONNE  02/19/19  8:49 AM  I, Chase Iverson MD, personally performed the services described in this documentation as scribed by the above individual in my presence, and it is both accurate and complete    Dictated utilizing Dragon dictation      Electronically signed by Chase Iverson MD at 2/19/2019  9:39 AM     Micaela Mc #[id number] (CSN:[id number]) (68 y.o. F) (Adm: 02/17/19)   [facility] [id number]   Patient Admission Status Report     Has Admission Order? PTA Med Review Complete? Home Meds Reconciled? Current Orders Reconciled?   Yes Yes Yes No      Vital Signs   Report   View Graph     02/17 0700  02/18 0659 02/18 0700  02/19 0659 02/19 0700  02/20 0659 02/20 0700  02/20 1346  Most Recent    Temp (°F)     96.1-98 98-99.3 97.5-99.8 98.1-98.5  98.5 (36.9)    Heart Rate     72-90  69-90 73-92  84    Resp     16-24 18-20 16-18 17-19  19    BP     93//87 112//85 97//77 136/60  136/60    Weight (kg)     110 110   110  110 kg (242 lb)     Sticky Notes from Clinical Staff   Comment      Last edited by  on  at           Vital Signs (last 2 days)     Date/Time  Temp  Pulse  Resp  BP  Device (Oxygen Therapy)  Oxygen Concentration (%)  SpO2   02/20/19 1325  --  84  --  --  nasal cannula   --  91   Device (Oxygen Therapy): recovery on 2 liters nasal cannula at 02/20/19 1325   02/20/19 1324  --  92  --  --  nasal cannula   --  90   Device (Oxygen Therapy): recovery on 2 liters nasal cannula at 02/20/19 1324   02/20/19 1321  --  --  --  --  room air   --  85 Abnormal    Device (Oxygen Therapy): room air with activity, ambulating at 02/20/19 1321   02/20/19 1320  --  --  --  --  room air   --  85 Abnormal    Device (Oxygen Therapy): room air with activity, ambulating at 02/20/19 1320   02/20/19 1317  --  82  --  --  nasal cannula   --  93   Device (Oxygen Therapy): 2 liters nasal cannnula at rest at 02/20/19 1317   02/20/19 1316   --  81  --  --  nasal cannula   --  93   Device (Oxygen Therapy): 2 liters nasal cannula at rest at 02/20/19 1316   02/20/19 1140  98.5 (36.9)  73  19  136/60  --  --  97   02/20/19 0830  --  --  --  --  room air  --  --   02/20/19 0707  98.1 (36.7)  --  17  --  --  --  --   02/20/19 0605  --  --  --  --  nasal cannula  --  --   02/20/19 0445  97.7 (36.5)  78  18  99/61  --  --  93   02/20/19 0405  --  --  --  --  nasal cannula  --  --   02/20/19 0205  --  --  --  --  nasal cannula  --  --   02/20/19 0005  --  --  --  --  nasal cannula  --  --   02/19/19 2355  97.7 (36.5)  69  16  97/42  --  --  95   02/19/19 2205  --  --  --  --  nasal cannula  --  --   02/19/19 2051  --  87  --  116/59  --  --  --   02/19/19 2005  --  --  --  --  nasal cannula  --  --   02/19/19 1901  97.8 (36.6)  90  18  109/54  --  --  96   02/19/19 1533  97.5 (36.4)  88  18  127/66  --  --  --   02/19/19 1200  --  82  --  123/60  nasal cannula  --  98   02/19/19 1000  --  --  --  --  nasal cannula  --  --   02/19/19 0800  99.8 (37.7)  88  --  129/77  nasal cannula  --  98   02/19/19 0600  --  82  18  132/65  nasal cannula  --  96   02/19/19 0500  --  82  --  129/74  --  --  94   02/19/19 0400  --  85  20  120/61  nasal cannula  --  92   02/19/19 0300  --  83  --  121/71  --  --  93   02/19/19 0200  --  83  18  120/71  nasal cannula  --  91   02/19/19 0100  --  78  --  112/68  nasal cannula  --  89 Abnormal    02/19/19 0008  --  82  --  122/79  --  --  91   02/18/19 2300  --  84  --  134/77  --  --  92   02/18/19 2230  --  88  --  134/75  --  --  91   02/18/19 2200  99.3 (37.4)  90  18  139/82  nasal cannula  --  96   02/18/19 2130  --  91  --  143/84  --  --  91   02/18/19 2100  --  90  --  145/83  --  --  91   02/18/19 2045  --  86  --  143/83  --  --  96   02/18/19 2030  --  84  --  144/88  --  --  95   02/18/19 2015  --  84  --  123/84   --  --  93   BP: post ambulation BP at 02/18/19 2015 02/18/19 2000  --  86  --  135/73  --  --  96  "  19 1945  --  94  --  123/71  --  --  96   19 1932  --  95  18  120/70  nasal cannula  --  95   19 19:24:16  --  100  18  126/76  --  --  90   19 18:50:01  --  92  18  139/85  --  --  93   19 1800  --  --  --  --  nasal cannula  --  --   19 1617  --  --  --  --  nasal cannula  --  --   19 1420  --  --  --  135/83  --  --  --   19 1400  --  --  --  --  nasal cannula  --  --   19 1200  --  --  --  --  nasal cannula  --  --   19 1155  98 (36.7)  82  18  135/83  nasal cannula  --  94   19 1000  --  --  --  --  nasal cannula  --  --   19 0800  --  --  --  --  nasal cannula  --  --   19 0722  98.4 (36.9)  90  18  147/85  --  --  98   19 0600  --  --  --  --  nonrebreather mask  --  --   19 0405  98 (36.7)  90  18  159/95  --  --  99   19 0400  --  --  --  --  nonrebreather mask  --  --   19 0200  --  --  --  --  NPPV/NIV  40  --   19 0100  --  --  --  --  NPPV/NIV  40  --   19 0047  96.1 (35.6)  86  22  147/85  NPPV/NIV  40  98   19 0040  --  --  24  --  NPPV/NIV  40  98   19 0000  --  88  --  158/91  --  --  97           [address]  [address] [facility] [address]  Dept. Phone:  [phone]  Dept. Fax:   Date Ordered: 2019         Patient:  Micaela Mc MRN:  [id number]   [address]  [facility] [address] :  1950  SSN:    Phone: No phone on record Sex:  F     Weight: 110 kg (242 lb)         Ht Readings from Last 1 Encounters:   19 167.6 cm (65.98\")         Oxygen Therapy         (Order ID: [id number])    Diagnosis:  NSTEMI (non-ST elevated myocardial infarction) (CMS/HCC) (I21.4 [ICD-10-CM] 410.70 [ICD-9-CM])  Multifocal pneumonia (J18.9 [ICD-10-CM] 486 [ICD-9-CM])  Takotsubo cardiomyopathy (I51.81 [ICD-10-CM] 429.83 [ICD-9-CM])   Quantity:  1     Delivery Modality: Nasal Cannula  Liters Per Minute: 2  Duration: With " Exertion  Equipment:  Oxygen Concentrator &  &  Portable Gaseous Oxygen System & Portable Oxygen Contents Gaseous &  Conserving Regulator  Length of Need (99 Months = Lifetime): 99 Months = Lifetime        Authorizing Provider's Phone: [phone]   Verbal Order Mode: Telephone with readback   Authorizing Provider: Kimber Polo MD  Authorizing Provider's NPI: [id number]     Order Entered By: Marie Gamez RN 2/20/2019  1:47 PM     Electronically signed by: Kimber Polo MD 2/20/2019  1:47 PM

## 2019-02-20 NOTE — PROGRESS NOTES
Continued Stay Note  [facility]     Patient Name: Micaela Mc  MRN: [id number]  Today's Date: 2/20/2019    Admit Date: 2/17/2019    Discharge Plan     Row Name 02/20/19 1554       Plan    Plan  update    Patient/Family in Agreement with Plan  yes    Plan Comments  PA for Entresto started, awaiting determination by [employer].      Final Discharge Disposition Code  01 - home or self-care    Row Name 02/20/19 1401       Plan    Plan  Home    Patient/Family in Agreement with Plan  yes    Plan Comments  Per RN, patient has qualifying O2 sats for excertion.  Spoke with patient at bedside regarding discharge plan and presented DME list to patient.   Patient has chosen to use [facility] in [facility] for her Oxygen.  No other discharge needs verbalized.  Called [facility] [phone] and spoke to Howard who request orders to be faxed to [phone].  Oxygen tank to be delivered to patient room today.  Patient plan is to discharge home via car with family to transport.     Final Discharge Disposition Code  01 - home or self-care    Final Note  Per RN, patient has qualifying O2 sats for excertion.  Spoke with patient at bedside regarding discharge plan and presented DME list to patient.   Patient has chosen to use [facility] in [facility] for her Oxygen.  No other discharge needs verbalized.  Called [facility] [phone] and spoke to Howard who request orders to be faxed to [phone].  Oxygen tank to be delivered to patient room today.  Patient plan is to discharge home via car with family to transport.         Discharge Codes    No documentation.       Expected Discharge Date and Time     Expected Discharge Date Expected Discharge Time    Feb 20, 2019             Marie Gamez, RN

## 2019-02-21 ENCOUNTER — READMISSION MANAGEMENT (OUTPATIENT)
Dept: CALL CENTER | Facility: HOSPITAL | Age: 69
End: 2019-02-21

## 2019-02-21 LAB
BH CV ECHO MEAS - BSA(HAYCOCK): 2.3 M^2
BH CV ECHO MEAS - BSA: 2.2 M^2
BH CV ECHO MEAS - BZI_BMI: 39.1 KILOGRAMS/M^2
BH CV ECHO MEAS - BZI_METRIC_HEIGHT: 167.6 CM
BH CV ECHO MEAS - BZI_METRIC_WEIGHT: 109.8 KG
BH CV ECHO MEAS - EDV(MOD-SP2): 101 ML
BH CV ECHO MEAS - EDV(MOD-SP4): 81 ML
BH CV ECHO MEAS - EF(MOD-BP): 51 %
BH CV ECHO MEAS - EF(MOD-SP2): 53.5 %
BH CV ECHO MEAS - EF(MOD-SP4): 48.1 %
BH CV ECHO MEAS - ESV(MOD-SP2): 47 ML
BH CV ECHO MEAS - ESV(MOD-SP4): 42 ML
BH CV ECHO MEAS - LV DIASTOLIC VOL/BSA (35-75): 37.3 ML/M^2
BH CV ECHO MEAS - LV SYSTOLIC VOL/BSA (12-30): 19.4 ML/M^2
BH CV ECHO MEAS - LVLD AP2: 7.9 CM
BH CV ECHO MEAS - LVLD AP4: 8 CM
BH CV ECHO MEAS - LVLS AP2: 6.9 CM
BH CV ECHO MEAS - LVLS AP4: 7.4 CM
BH CV ECHO MEAS - SI(MOD-SP2): 24.9 ML/M^2
BH CV ECHO MEAS - SI(MOD-SP4): 18 ML/M^2
BH CV ECHO MEAS - SV(MOD-SP2): 54 ML
BH CV ECHO MEAS - SV(MOD-SP4): 39 ML
LV EF 2D ECHO EST: 50 %
MAXIMAL PREDICTED HEART RATE: 151 BPM
STRESS TARGET HR: 128 BPM

## 2019-02-21 RX ORDER — FUROSEMIDE 40 MG/1
40 TABLET ORAL DAILY
Qty: 30 TABLET | Refills: 1 | Status: SHIPPED | OUTPATIENT
Start: 2019-02-21 | End: 2019-09-25 | Stop reason: SDUPTHER

## 2019-02-21 RX ORDER — POTASSIUM CHLORIDE 20 MEQ/1
20 TABLET, EXTENDED RELEASE ORAL DAILY
Qty: 30 TABLET | Refills: 1 | Status: SHIPPED | OUTPATIENT
Start: 2019-02-21 | End: 2019-09-25 | Stop reason: SDUPTHER

## 2019-02-21 RX ORDER — ATORVASTATIN CALCIUM 80 MG/1
80 TABLET, FILM COATED ORAL NIGHTLY
Qty: 30 TABLET | Refills: 1 | Status: SHIPPED | OUTPATIENT
Start: 2019-02-21

## 2019-02-21 NOTE — OUTREACH NOTE
Prep Survey      Responses   Facility patient discharged from?  [facility]   Is patient eligible?  Yes   Discharge diagnosis  Takotsubo cardiomyopathy  NSTEMI (non-ST elevated myocardial infarction   Does the patient have one of the following disease processes/diagnoses(primary or secondary)?  Acute MI (STEMI,NSTEMI)   Does the patient have Home health ordered?  No   Is there a DME ordered?  Yes   What DME was ordered?  [facility] in [facility] for her   Prep survey completed?  Yes          Nga Mcgregor RN

## 2019-02-21 NOTE — PROGRESS NOTES
Continued Stay Note  [facility]     Patient Name: Micaela Mc  MRN: [id number]  Today's Date: 2/21/2019    Admit Date: 2/17/2019    Discharge Plan     Row Name 02/21/19 0836       Plan    Plan  home    Patient/Family in Agreement with Plan  yes    Plan Comments  Received faxed notification that Entresto has been approved  02/19/2019-02/19/2021.    Final Discharge Disposition Code  01 - home or self-care        Discharge Codes    No documentation.       Expected Discharge Date and Time     Expected Discharge Date Expected Discharge Time    Feb 20, 2019             Marie Gamez RN

## 2019-02-22 LAB
BACTERIA SPEC AEROBE CULT: ABNORMAL
GRAM STN SPEC: ABNORMAL
ISOLATED FROM: ABNORMAL

## 2019-02-25 ENCOUNTER — READMISSION MANAGEMENT (OUTPATIENT)
Dept: CALL CENTER | Facility: HOSPITAL | Age: 69
End: 2019-02-25

## 2019-02-25 NOTE — OUTREACH NOTE
AMI Week 1 Survey      Responses   Facility patient discharged from?  [place]   Does the patient have one of the following disease processes/diagnoses(primary or secondary)?  Acute MI (STEMI,NSTEMI)   Is there a successful TCM telephone encounter documented?  No   Week 1 attempt successful?  Yes   Call start time  1258   Call end time  1314   Discharge diagnosis  Takotsubo cardiomyopathy  NSTEMI (non-ST elevated myocardial infarction   Is patient permission given to speak with other caregiver?  Yes   List who call center can speak with     Meds reviewed with patient/caregiver?  Yes   Is the patient having any side effects they believe may be caused by any medication additions or changes?  No   Does the patient have all prescriptions related to this admission filled (includes statins,anticoagulants,HTN meds,anti-arrhythmia meds)  Yes   Is the patient taking all medications as directed (includes completed medication regime)?  Yes   Does the patient have a primary care provider?   Yes   Does the patient have an appointment with their PCP,cardiologist,or clinic within 7 days of discharge?  Yes   Comments regarding PCP  Dr. Howard   Has the patient kept scheduled appointments due by today?  N/A   Has home health visited the patient within 72 hours of discharge?  N/A   What DME was ordered?  Home O2   Has all DME been delivered?  Yes   Psychosocial issues?  No   Did the patient receive a copy of their discharge instructions?  Yes   Nursing interventions  Reviewed instructions with patient   What is the patient's perception of their health status since discharge?  Improving   Nursing interventions  Nurse provided patient education   Is the patient/caregiver able to teach back signs and symptoms of when to call for help immediately:  Sudden chest discomfort, Sudden discomfort in arms, back, neck or jaw, Shortness of breath at any time, Sudden sweating or clammy skin, Nausea or vomiting, Dizziness or lightheadedness    Nursing interventions  Nurse provided patient education   Is the pateint /caregiver able to teach back the importance of cardiac rehab?  Yes   Nursing interventions  Provided education on importance of cardiac rehab   Is the patient/caregiver able to teach back lifestyle changes to help prevent MIs  Heart healthy diet, Maintaining a healthy weight, Reducing stress, Managing diabetes   Is the patient/caregiver able to teach back ways to prevent a second heart attack:  Take medications, Follow up with MD, Participate in Cardiac Rehab, Manage risk factors   If the patient is a current smoker, are they able to teach back resources for cessation?  -- [nonsmoker]   Is the patient/caregiver able to teach back the hierarchy of who to call/visit for symptoms/problems? PCP, Specialist, Home health nurse, Urgent Care, ED, 911  Yes   Week 1 call completed?  Yes          Charmaine Dyer RN

## 2019-02-26 ENCOUNTER — NURSE TRIAGE (OUTPATIENT)
Dept: CALL CENTER | Facility: HOSPITAL | Age: 69
End: 2019-02-26

## 2019-02-26 NOTE — TELEPHONE ENCOUNTER
"She is calling about an Oxygen concentrator that is portable. Looked on the Web and gave the patient several options to possible rent the concentrator.     Reason for Disposition  • Health Information question, no triage required and triager able to answer question    Additional Information  • Negative: [1] Caller is not with the adult (patient) AND [2] reporting urgent symptoms  • Negative: Lab result questions  • Negative: Medication questions  • Negative: Caller cannot be reached by phone  • Negative: Caller has already spoken to PCP or another triager  • Negative: RN needs further essential information from caller in order to complete triage  • Negative: Requesting regular office appointment  • Negative: [1] Caller requesting NON-URGENT health information AND [2] PCP's office is the best resource    Answer Assessment - Initial Assessment Questions  1. REASON FOR CALL or QUESTION: \"What is your reason for calling today?\" or \"How can I best help you?\" or \"What question do you have that I can help answer?\"      She is wanting to know where to rent a oxygen concentrator.    Protocols used: INFORMATION ONLY CALL-ADULT-      "

## 2019-02-27 LAB
BACTERIA SPEC AEROBE CULT: ABNORMAL
BACTERIA SPEC AEROBE CULT: ABNORMAL
GRAM STN SPEC: ABNORMAL
ISOLATED FROM: ABNORMAL
ISOLATED FROM: ABNORMAL

## 2019-03-04 ENCOUNTER — READMISSION MANAGEMENT (OUTPATIENT)
Dept: CALL CENTER | Facility: HOSPITAL | Age: 69
End: 2019-03-04

## 2019-03-04 NOTE — OUTREACH NOTE
AMI Week 2 Survey      Responses   Facility patient discharged from?  [place]   Does the patient have one of the following disease processes/diagnoses(primary or secondary)?  Acute MI (STEMI,NSTEMI)   Week 2 attempt successful?  Yes   Call start time  1502   Call end time  1508   Discharge diagnosis  Takotsubo cardiomyopathy  NSTEMI (non-ST elevated myocardial infarction   Meds reviewed with patient/caregiver?  Yes   Is the patient having any side effects they believe may be caused by any medication additions or changes?  No   Does the patient have all prescriptions related to this admission filled (includes statins,anticoagulants,HTN meds,anti-arrhythmia meds)  Yes   Is the patient taking all medications as directed (includes completed medication regime)?  Yes   Does the patient have a primary care provider?   Yes   Does the patient have an appointment with their PCP,cardiologist,or clinic within 7 days of discharge?  Yes   Has the patient kept scheduled appointments due by today?  Yes   Has home health visited the patient within 72 hours of discharge?  N/A   What DME was ordered?  Home O2   Has all DME been delivered?  Yes   Psychosocial issues?  No   Did the patient receive a copy of their discharge instructions?  Yes   Nursing interventions  Reviewed instructions with patient   What is the patient's perception of their health status since discharge?  Improving   Nursing interventions  Nurse provided patient education   Is the patient/caregiver able to teach back signs and symptoms of when to call for help immediately:  Sudden chest discomfort, Sudden discomfort in arms, back, neck or jaw, Shortness of breath at any time, Sudden sweating or clammy skin, Nausea or vomiting, Dizziness or lightheadedness   Nursing interventions  Nurse provided patient education   Is the pateint /caregiver able to teach back the importance of cardiac rehab?  Yes   Nursing interventions  Provided education on importance of cardiac  rehab   Is the patient/caregiver able to teach back lifestyle changes to help prevent MIs  Heart healthy diet, Maintaining a healthy weight, Reducing stress, Managing diabetes, Regular exercise as approved by provider   Is the patient/caregiver able to teach back ways to prevent a second heart attack:  Take medications, Follow up with MD, Participate in Cardiac Rehab, Manage risk factors   Is the patient/caregiver able to teach back the hierarchy of who to call/visit for symptoms/problems? PCP, Specialist, Home health nurse, Urgent Care, ED, 911  Yes   Week 2 call completed?  Yes          Ben Munoz RN

## 2019-03-05 NOTE — PROGRESS NOTES
Subjective:     Encounter Date:03/06/2019    Primary Care Physician: Katya Howard MD      Patient ID: Micaela Mc is a 68 y.o. female.    Chief Complaint:Follow-up    PROBLEM LIST:  1. Takotsubo cardiomyopathy  1. February 18, 2019 cardiac catheterization normal coronaries.  EF 20-25%.  LV gram consistent with Takot subo.  2. Echocardiogram February 20, 2019 EF of 50%.  [place] hypokinetic.  2. Hypertension.  3. Dyslipidemia.  4. Murmur.  5. Type 2 diabetes mellitus.  6. GERD.  7. Peptic ulcer disease.  8. Depression.  9. Obstructive sleep apnea, intolerant to CPAP.  10. Iron deficiency.  11. Tonsillectomy.  12. Partial hysterectomy.  13.  Appendectomy.  14. Right ankle fracture.    15. Remote LAP-BAND surgery.        Allergies   Allergen Reactions   • Codeine Itching         Current Outpatient Medications:   •  atorvastatin (LIPITOR) 80 MG tablet, Take 1 tablet by mouth Every Night., Disp: 30 tablet, Rfl: 1  •  Calcium Carb-Cholecalciferol (CALCIUM 1000 + D PO), Take 1,000 mg by mouth Daily., Disp: , Rfl:   •  ferrous sulfate 324 (65 FE) MG tablet delayed-release EC tablet, Take 324 mg by mouth Daily With Breakfast., Disp: , Rfl:   •  FLUoxetine (PROzac) 20 MG capsule, Take 40 mg by mouth Daily., Disp: , Rfl:   •  furosemide (LASIX) 40 MG tablet, Take 1 tablet by mouth Daily., Disp: 30 tablet, Rfl: 1  •  glimepiride (AMARYL) 1 MG tablet, Take 1 mg by mouth Every Morning Before Breakfast., Disp: , Rfl:   •  hyoscyamine (LEVSIN) 0.125 MG SL tablet, Take 0.125 mg by mouth Every 4 (Four) Hours As Needed for Cramping., Disp: , Rfl:   •  lansoprazole (PREVACID) 30 MG capsule, Take 30 mg by mouth Daily., Disp: , Rfl:   •  metFORMIN (GLUCOPHAGE) 500 MG tablet, Take 500 mg by mouth 2 (Two) Times a Day With Meals., Disp: , Rfl:   •  metoprolol tartrate (LOPRESSOR) 50 MG tablet, Take 50 mg by mouth 2 (Two) Times a Day., Disp: , Rfl:   •  potassium chloride (K-DUR,KLOR-CON) 20 MEQ CR tablet, Take 1 tablet by mouth  "Daily., Disp: 30 tablet, Rfl: 1  •  sacubitril-valsartan (ENTRESTO) 49-51 MG tablet, Take 1 tablet by mouth Every 12 (Twelve) Hours., Disp: 60 tablet, Rfl: 11        History of Present Illness    Patient returns today for hospital follow-up of Takotsubo's cardiomyopathy.  She presented with chest discomfort, echocardiogram and cardiac cath showed initial LVEF of 20-25% consistent with Takotsubo's cardiomyopathy and normal coronary arteries.  With initiation of medical therapy and within 48 hours (at the time of hospital discharge) the LVEF is already improved significantly to 45-50%.  Patient currently notes that she is still somewhat \"slow getting around\", but is not quite at her baseline but overall denies any further chest pain orthopnea PND or peripheral edema.  Overall she feels improved, notes since going to [place], she feels \"something went through me\", and she feels significantly better.  He was discharged home on home oxygen, and was wearing this 24/7.    The following portions of the patient's history were reviewed and updated as appropriate: allergies, current medications, past family history, past medical history, past social history, past surgical history and problem list.      Social History     Tobacco Use   • Smoking status: Never Smoker   • Smokeless tobacco: Never Used   Substance Use Topics   • Alcohol use: Yes     Comment: OCCASIONALLY    • Drug use: No         Review of Systems   Constitution: Positive for night sweats and weight loss.   Cardiovascular: Negative.    Respiratory: Negative.    Hematologic/Lymphatic: Negative for bleeding problem. Bruises/bleeds easily.   Skin: Negative for rash.   Musculoskeletal: Positive for arthritis and back pain. Negative for muscle weakness and myalgias.   Gastrointestinal: Positive for heartburn. Negative for nausea and vomiting.   Neurological: Negative.           Objective:    height is 167.6 cm (66\") and weight is 110 kg (243 lb). Her blood pressure is " 128/74 and her pulse is 67. Her oxygen saturation is 98%. With ambulation, her oxygen saturation level did not drop below 97% on room air.        Physical Exam   Constitutional: She is oriented to person, place, and time. She appears well-developed and well-nourished.   HENT:   Mouth/Throat: Oropharynx is clear and moist.   Neck: No JVD present. Carotid bruit is not present. No thyromegaly present.   Cardiovascular: Regular rhythm, S1 normal, S2 normal, normal heart sounds and intact distal pulses. Exam reveals no gallop, no S3 and no S4.   No murmur heard.  Pulses:       Carotid pulses are 2+ on the right side, and 2+ on the left side.       Radial pulses are 2+ on the right side, and 2+ on the left side.   Pulmonary/Chest: Breath sounds normal.   Abdominal: Soft. Bowel sounds are normal. She exhibits no mass. There is no tenderness.   Musculoskeletal: She exhibits no edema.   Neurological: She is alert and oriented to person, place, and time.   Skin: Skin is warm and dry. No rash noted.       Procedures          Assessment:   Assessment/Plan      Micaela was seen today for follow-up.    Diagnoses and all orders for this visit:    Takotsubo cardiomyopathy    Essential hypertension    Pure hypercholesterolemia    Acute respiratory failure with hypoxia and hypercapnia (CMS/HCC)      .  Takotsubo's/stress-induced cardiomyopathy, significantly improved.  Would recommend continuing lifelong beta-blocker.  2.  Hypertension, well controlled.  Given the normalization of her LVEF, and the expense she is having with Entresto, we will discontinue this medicine and put her back on her losartan 100 mg daily.  3.  Dyslipidemia continue statin, check fasting lipid profile in 1-2 months with primary physician.  4.  Hypoxic respiratory failure in the setting of community acquired pneumonia and Takotsubo's cardia myopathy.  She currently is no longer hypoxic with ambulation, and no longer needs to wear this.  5.  Revisit in 6 months  time or as needed symptom change.  Will check echocardiogram     Chase Iverson MD    Dictated utilizing Dragon dictation

## 2019-03-06 ENCOUNTER — OFFICE VISIT (OUTPATIENT)
Dept: CARDIOLOGY | Facility: CLINIC | Age: 69
End: 2019-03-06

## 2019-03-06 VITALS
SYSTOLIC BLOOD PRESSURE: 128 MMHG | HEART RATE: 67 BPM | BODY MASS INDEX: 39.05 KG/M2 | HEIGHT: 66 IN | DIASTOLIC BLOOD PRESSURE: 74 MMHG | OXYGEN SATURATION: 98 % | WEIGHT: 243 LBS

## 2019-03-06 DIAGNOSIS — J96.02 ACUTE RESPIRATORY FAILURE WITH HYPOXIA AND HYPERCAPNIA (HCC): ICD-10-CM

## 2019-03-06 DIAGNOSIS — I10 ESSENTIAL HYPERTENSION: ICD-10-CM

## 2019-03-06 DIAGNOSIS — E78.00 PURE HYPERCHOLESTEROLEMIA: ICD-10-CM

## 2019-03-06 DIAGNOSIS — I51.81 TAKOTSUBO CARDIOMYOPATHY: Primary | ICD-10-CM

## 2019-03-06 DIAGNOSIS — J96.01 ACUTE RESPIRATORY FAILURE WITH HYPOXIA AND HYPERCAPNIA (HCC): ICD-10-CM

## 2019-03-06 PROCEDURE — 99213 OFFICE O/P EST LOW 20 MIN: CPT | Performed by: INTERNAL MEDICINE

## 2019-03-06 RX ORDER — IRBESARTAN 150 MG/1
150 TABLET ORAL NIGHTLY
Qty: 90 TABLET | Refills: 3 | Status: ON HOLD | OUTPATIENT
Start: 2019-03-06 | End: 2019-11-24

## 2019-03-11 ENCOUNTER — READMISSION MANAGEMENT (OUTPATIENT)
Dept: CALL CENTER | Facility: HOSPITAL | Age: 69
End: 2019-03-11

## 2019-03-11 NOTE — OUTREACH NOTE
AMI Week 1 Survey      Responses   Facility patient discharged from?  [facility]   Does the patient have one of the following disease processes/diagnoses(primary or secondary)?  Acute MI (STEMI,NSTEMI)   Call start time  1629   Call end time  1632   Medication alerts for this patient  , has been taken  Estro and restarted the Losartan   Meds reviewed with patient/caregiver?  Yes   Is the patient taking all medications as directed (includes completed medication regime)?  Yes   Comments regarding appointments  has seen both her cardiologist and provdier   Has the patient kept scheduled appointments due by today?  Yes   What is the patient's perception of their health status since discharge?  Improving   Wrap up additional comments  to start rehab next week and  doing fine          Tawnya Barrera RN

## 2019-03-11 NOTE — OUTREACH NOTE
AMI Week 3 Survey      Responses   Facility patient discharged from?  [place]   Does the patient have one of the following disease processes/diagnoses(primary or secondary)?  Acute MI (STEMI,NSTEMI)   Week 3 attempt successful?  Yes   Call start time  1629   Call end time  1632   Medication alerts for this patient  , has been taken  Estro and restarted the Losartan   Meds reviewed with patient/caregiver?  Yes   Is the patient taking all medications as directed (includes completed medication regime)?  Yes   Comments regarding appointments  has seen both her cardiologist and provdier   Has the patient kept scheduled appointments due by today?  Yes   What is the patient's perception of their health status since discharge?  Improving   Week 3 call completed?  Yes   Wrap up additional comments  to start rehab next week and  doing fine          Tawnya Barrera RN

## 2019-03-18 ENCOUNTER — READMISSION MANAGEMENT (OUTPATIENT)
Dept: CALL CENTER | Facility: HOSPITAL | Age: 69
End: 2019-03-18

## 2019-03-18 NOTE — OUTREACH NOTE
AMI Week 4 Survey      Responses   Facility patient discharged from?  [facility]   Does the patient have one of the following disease processes/diagnoses(primary or secondary)?  Acute MI (STEMI,NSTEMI)   Week 4 attempt successful?  No          Harmeet Hobbs RN

## 2019-05-20 ENCOUNTER — TRANSCRIBE ORDERS (OUTPATIENT)
Dept: ADMINISTRATIVE | Facility: HOSPITAL | Age: 69
End: 2019-05-20

## 2019-05-20 DIAGNOSIS — R91.8 GROUND GLASS OPACITY PRESENT ON IMAGING OF LUNG: Primary | ICD-10-CM

## 2019-05-28 ENCOUNTER — HOSPITAL ENCOUNTER (OUTPATIENT)
Dept: CT IMAGING | Facility: HOSPITAL | Age: 69
Discharge: HOME | End: 2019-05-28
Admitting: FAMILY MEDICINE

## 2019-05-28 DIAGNOSIS — R91.8 GROUND GLASS OPACITY PRESENT ON IMAGING OF LUNG: ICD-10-CM

## 2019-05-28 PROCEDURE — 71250 CT THORAX DX C-: CPT

## 2019-06-04 ENCOUNTER — TRANSCRIBE ORDERS (OUTPATIENT)
Dept: ADMINISTRATIVE | Facility: HOSPITAL | Age: 69
End: 2019-06-04

## 2019-06-04 DIAGNOSIS — Z12.31 VISIT FOR SCREENING MAMMOGRAM: Primary | ICD-10-CM

## 2019-07-11 ENCOUNTER — HOSPITAL ENCOUNTER (OUTPATIENT)
Dept: MAMMOGRAPHY | Facility: HOSPITAL | Age: 69
Discharge: HOME | End: 2019-07-11
Admitting: FAMILY MEDICINE

## 2019-07-11 DIAGNOSIS — Z12.31 VISIT FOR SCREENING MAMMOGRAM: ICD-10-CM

## 2019-07-11 PROCEDURE — 77063 BREAST TOMOSYNTHESIS BI: CPT | Performed by: RADIOLOGY

## 2019-07-11 PROCEDURE — 77067 SCR MAMMO BI INCL CAD: CPT

## 2019-07-11 PROCEDURE — 77063 BREAST TOMOSYNTHESIS BI: CPT

## 2019-07-11 PROCEDURE — 77067 SCR MAMMO BI INCL CAD: CPT | Performed by: RADIOLOGY

## 2019-09-18 ENCOUNTER — HOSPITAL ENCOUNTER (OUTPATIENT)
Dept: CARDIOLOGY | Facility: HOSPITAL | Age: 69
Discharge: HOME | End: 2019-09-18
Admitting: INTERNAL MEDICINE

## 2019-09-18 VITALS — BODY MASS INDEX: 39.05 KG/M2 | WEIGHT: 243 LBS | HEIGHT: 66 IN

## 2019-09-18 DIAGNOSIS — I10 ESSENTIAL HYPERTENSION: ICD-10-CM

## 2019-09-18 DIAGNOSIS — J96.01 ACUTE RESPIRATORY FAILURE WITH HYPOXIA AND HYPERCAPNIA (HCC): ICD-10-CM

## 2019-09-18 DIAGNOSIS — J96.02 ACUTE RESPIRATORY FAILURE WITH HYPOXIA AND HYPERCAPNIA (HCC): ICD-10-CM

## 2019-09-18 DIAGNOSIS — E78.00 PURE HYPERCHOLESTEROLEMIA: ICD-10-CM

## 2019-09-18 DIAGNOSIS — I51.81 TAKOTSUBO CARDIOMYOPATHY: ICD-10-CM

## 2019-09-18 PROCEDURE — 93306 TTE W/DOPPLER COMPLETE: CPT | Performed by: INTERNAL MEDICINE

## 2019-09-18 PROCEDURE — 93306 TTE W/DOPPLER COMPLETE: CPT

## 2019-09-19 LAB
BH CV ECHO MEAS - AO ROOT AREA (BSA CORRECTED): 1.4
BH CV ECHO MEAS - AO ROOT AREA: 6.9 CM^2
BH CV ECHO MEAS - AO ROOT DIAM: 3 CM
BH CV ECHO MEAS - BSA(HAYCOCK): 2.3 M^2
BH CV ECHO MEAS - BSA: 2.2 M^2
BH CV ECHO MEAS - BZI_BMI: 39.2 KILOGRAMS/M^2
BH CV ECHO MEAS - BZI_METRIC_HEIGHT: 167.6 CM
BH CV ECHO MEAS - BZI_METRIC_WEIGHT: 110.2 KG
BH CV ECHO MEAS - EDV(CUBED): 136.1 ML
BH CV ECHO MEAS - EDV(MOD-SP2): 66 ML
BH CV ECHO MEAS - EDV(MOD-SP4): 91 ML
BH CV ECHO MEAS - EDV(TEICH): 126.3 ML
BH CV ECHO MEAS - EF(CUBED): 72 %
BH CV ECHO MEAS - EF(MOD-BP): 62 %
BH CV ECHO MEAS - EF(MOD-SP2): 57.6 %
BH CV ECHO MEAS - EF(MOD-SP4): 63.7 %
BH CV ECHO MEAS - EF(TEICH): 63.3 %
BH CV ECHO MEAS - ESV(CUBED): 38.1 ML
BH CV ECHO MEAS - ESV(MOD-SP2): 28 ML
BH CV ECHO MEAS - ESV(MOD-SP4): 33 ML
BH CV ECHO MEAS - ESV(TEICH): 46.3 ML
BH CV ECHO MEAS - FS: 34.6 %
BH CV ECHO MEAS - IVS/LVPW: 0.93
BH CV ECHO MEAS - IVSD: 0.9 CM
BH CV ECHO MEAS - LAD MAJOR: 5.3 CM
BH CV ECHO MEAS - LAT PEAK E' VEL: 6 CM/SEC
BH CV ECHO MEAS - LATERAL E/E' RATIO: 14.8
BH CV ECHO MEAS - LV DIASTOLIC VOL/BSA (35-75): 41.9 ML/M^2
BH CV ECHO MEAS - LV MASS(C)D: 173.6 GRAMS
BH CV ECHO MEAS - LV MASS(C)DI: 79.9 GRAMS/M^2
BH CV ECHO MEAS - LV SYSTOLIC VOL/BSA (12-30): 15.2 ML/M^2
BH CV ECHO MEAS - LVIDD: 5.1 CM
BH CV ECHO MEAS - LVIDS: 3.4 CM
BH CV ECHO MEAS - LVLD AP2: 7.3 CM
BH CV ECHO MEAS - LVLD AP4: 7.2 CM
BH CV ECHO MEAS - LVLS AP2: 6.3 CM
BH CV ECHO MEAS - LVLS AP4: 6.2 CM
BH CV ECHO MEAS - LVPWD: 0.97 CM
BH CV ECHO MEAS - MED PEAK E' VEL: 4.2 CM/SEC
BH CV ECHO MEAS - MEDIAL E/E' RATIO: 21.2
BH CV ECHO MEAS - MV A MAX VEL: 95.3 CM/SEC
BH CV ECHO MEAS - MV DEC TIME: 0.28 SEC
BH CV ECHO MEAS - MV E MAX VEL: 91.3 CM/SEC
BH CV ECHO MEAS - MV E/A: 0.96
BH CV ECHO MEAS - PA ACC SLOPE: 746.1 CM/SEC^2
BH CV ECHO MEAS - PA ACC TIME: 0.11 SEC
BH CV ECHO MEAS - PA PR(ACCEL): 29.9 MMHG
BH CV ECHO MEAS - RAP SYSTOLE: 3 MMHG
BH CV ECHO MEAS - RVSP: 21 MMHG
BH CV ECHO MEAS - SI(CUBED): 45.1 ML/M^2
BH CV ECHO MEAS - SI(MOD-SP2): 17.5 ML/M^2
BH CV ECHO MEAS - SI(MOD-SP4): 26.7 ML/M^2
BH CV ECHO MEAS - SI(TEICH): 36.8 ML/M^2
BH CV ECHO MEAS - SV(CUBED): 98 ML
BH CV ECHO MEAS - SV(MOD-SP2): 38 ML
BH CV ECHO MEAS - SV(MOD-SP4): 58 ML
BH CV ECHO MEAS - SV(TEICH): 80 ML
BH CV ECHO MEAS - TAPSE (>1.6): 2 CM2
BH CV ECHO MEAS - TR MAX PG: 18 MMHG
BH CV ECHO MEAS - TR MAX VEL: 208.4 CM/SEC
BH CV ECHO MEASUREMENTS AVERAGE E/E' RATIO: 17.9
BH CV VAS BP RIGHT ARM: NORMAL MMHG
BH CV XLRA - RV BASE: 3.7 CM
BH CV XLRA - RV LENGTH: 7.6 CM
BH CV XLRA - RV MID: 3.1 CM
BH CV XLRA - TDI S': 5.36 CM/SEC
LEFT ATRIUM VOLUME INDEX: 29.5 ML/M^2
LEFT ATRIUM VOLUME: 64 ML
LV EF 2D ECHO EST: 50 %

## 2019-09-20 ENCOUNTER — TELEPHONE (OUTPATIENT)
Dept: CARDIOLOGY | Facility: CLINIC | Age: 69
End: 2019-09-20

## 2019-09-20 NOTE — TELEPHONE ENCOUNTER
Left VM advising of below    ----- Message from Chase Iverson MD sent at 9/19/2019  3:24 PM EDT -----  Normal results, no chane from prior

## 2019-09-25 ENCOUNTER — OFFICE VISIT (OUTPATIENT)
Dept: CARDIOLOGY | Facility: CLINIC | Age: 69
End: 2019-09-25

## 2019-09-25 VITALS
HEIGHT: 65 IN | HEART RATE: 55 BPM | OXYGEN SATURATION: 97 % | SYSTOLIC BLOOD PRESSURE: 110 MMHG | WEIGHT: 228 LBS | BODY MASS INDEX: 37.99 KG/M2 | DIASTOLIC BLOOD PRESSURE: 68 MMHG

## 2019-09-25 DIAGNOSIS — I51.81 TAKOTSUBO CARDIOMYOPATHY: Primary | ICD-10-CM

## 2019-09-25 DIAGNOSIS — I10 ESSENTIAL HYPERTENSION: ICD-10-CM

## 2019-09-25 PROCEDURE — 99213 OFFICE O/P EST LOW 20 MIN: CPT | Performed by: NURSE PRACTITIONER

## 2019-09-25 RX ORDER — POTASSIUM CHLORIDE 20 MEQ/1
20 TABLET, EXTENDED RELEASE ORAL DAILY PRN
Qty: 30 TABLET | Refills: 1 | Status: SHIPPED | OUTPATIENT
Start: 2019-09-25 | End: 2021-08-03 | Stop reason: SDUPTHER

## 2019-09-25 RX ORDER — FUROSEMIDE 40 MG/1
40 TABLET ORAL DAILY PRN
Qty: 30 TABLET | Refills: 1 | Status: SHIPPED | OUTPATIENT
Start: 2019-09-25

## 2019-09-25 NOTE — PROGRESS NOTES
Subjective:     Encounter Date:09/25/2019    Primary Care Physician: Katya Howard MD      Patient ID: Micaela Mc is a 69 y.o. female.    Chief Complaint:Follow-up    PROBLEM LIST:  1. Takotsubo cardiomyopathy  1. February 18, 2019 cardiac catheterization normal coronaries.  EF 20-25%.  LV gram consistent with Takot subo.  2. Echocardiogram February 20, 2019 EF of 50%.  [place] hypokinetic.  3. 9/18/2019 echo EF 50%.  Mild mitral regurgitation.  2. Hypertension.  3. Dyslipidemia.  4. Murmur.  5. Type 2 diabetes mellitus.  6. GERD.  7. Peptic ulcer disease.  8. Depression.  9. Obstructive sleep apnea, intolerant to CPAP.  10. Iron deficiency.  11. Tonsillectomy.  12. Partial hysterectomy.  13.  Appendectomy.  14. Right ankle fracture.    15. Remote LAP-BAND surgery        Allergies   Allergen Reactions   • Codeine Itching         Current Outpatient Medications:   •  atorvastatin (LIPITOR) 80 MG tablet, Take 1 tablet by mouth Every Night., Disp: 30 tablet, Rfl: 1  •  Calcium Carb-Cholecalciferol (CALCIUM 1000 + D PO), Take 1,000 mg by mouth Daily., Disp: , Rfl:   •  ferrous sulfate 324 (65 FE) MG tablet delayed-release EC tablet, Take 324 mg by mouth Daily With Breakfast., Disp: , Rfl:   •  FLUoxetine (PROzac) 20 MG capsule, Take 40 mg by mouth Daily., Disp: , Rfl:   •  furosemide (LASIX) 40 MG tablet, Take 1 tablet by mouth Daily., Disp: 30 tablet, Rfl: 1  •  glimepiride (AMARYL) 1 MG tablet, Take 1 mg by mouth Every Morning Before Breakfast., Disp: , Rfl:   •  hyoscyamine (LEVSIN) 0.125 MG SL tablet, Take 0.125 mg by mouth Every 4 (Four) Hours As Needed for Cramping., Disp: , Rfl:   •  lansoprazole (PREVACID) 30 MG capsule, Take 30 mg by mouth Daily., Disp: , Rfl:   •  metFORMIN (GLUCOPHAGE) 500 MG tablet, Take 500 mg by mouth 2 (Two) Times a Day With Meals., Disp: , Rfl:   •  metoprolol tartrate (LOPRESSOR) 50 MG tablet, Take 50 mg by mouth 2 (Two) Times a Day., Disp: , Rfl:   •  potassium chloride  "(K-AL,KLOR-CON) 20 MEQ CR tablet, Take 1 tablet by mouth Daily., Disp: 30 tablet, Rfl: 1  •  irbesartan (AVAPRO) 150 MG tablet, Take 1 tablet by mouth Every Night., Disp: 90 tablet, Rfl: 3        History of Present Illness    Patient is a 69-year-old  female who we are seeing today for follow-up of Takot subo cardiomyopathy.  She recently had an echocardiogram performed which showed an ejection fraction of 50%.  She has questions about whether or not she still requires all of her current medications.  Denies any chest pain, pressure, tightness.  She has been increasing her physical activity and has been attending [employer].  She notes that she has had intentional weight loss.  Denies any syncope, near-syncope, or edema.  Notes some occasional positional dizziness.    The following portions of the patient's history were reviewed and updated as appropriate: allergies, current medications, past family history, past medical history, past social history, past surgical history and problem list.      Social History     Tobacco Use   • Smoking status: Never Smoker   • Smokeless tobacco: Never Used   Substance Use Topics   • Alcohol use: Yes     Comment: OCCASIONALLY    • Drug use: No         Review of Systems   Constitution: Positive for weight loss.   Cardiovascular: Negative.    Respiratory: Negative.    Hematologic/Lymphatic: Negative for bleeding problem. Does not bruise/bleed easily.   Skin: Negative for rash.   Musculoskeletal: Negative for muscle weakness and myalgias.   Gastrointestinal: Negative for heartburn, nausea and vomiting.   Genitourinary: Positive for frequency.   Neurological: Positive for light-headedness.   Psychiatric/Behavioral: The patient is nervous/anxious.           Objective:    height is 165.1 cm (65\") and weight is 103 kg (228 lb). Her blood pressure is 110/68 and her pulse is 55. Her oxygen saturation is 97%.         Physical Exam   Constitutional: She is oriented to person, " place, and time. She appears well-developed and well-nourished. No distress.   Obese   Neck: No JVD present. No tracheal deviation present.   Cardiovascular: Normal rate, regular rhythm and normal heart sounds. Exam reveals no friction rub.   No murmur heard.  Pulmonary/Chest: Effort normal and breath sounds normal. No respiratory distress.   Abdominal: Soft. Bowel sounds are normal. There is no tenderness.   Musculoskeletal: She exhibits no edema or deformity.   Neurological: She is alert and oriented to person, place, and time.   Skin: Skin is warm and dry.       Procedures          Assessment:   Assessment/Plan      Micaela was seen today for follow-up.    Diagnoses and all orders for this visit:    Takotsubo cardiomyopathy, most recent ejection fraction 50%.  No current evidence of volume overload.  On beta-blocker and capital ARB.  Patient unsure however if she is on losartan or irbesartan at this time.    Essential hypertension, controlled.  On beta-blocker and capital ARB.      Plan:  1. Given patient's near normal LVEF and no sign of volume overload at this time we will change her Lasix and potassium therapy to as needed.  2. Continue other medical therapy as currently prescribed.  3. Follow-up in 1 year's time or sooner if needed.       Taylor TRUONG     Dictated utilizing Dragon dictation

## 2019-11-23 ENCOUNTER — HOSPITAL ENCOUNTER (OUTPATIENT)
Facility: HOSPITAL | Age: 69
Setting detail: OBSERVATION
Discharge: HOME | End: 2019-11-26
Attending: EMERGENCY MEDICINE | Admitting: INTERNAL MEDICINE

## 2019-11-23 ENCOUNTER — APPOINTMENT (OUTPATIENT)
Dept: GENERAL RADIOLOGY | Facility: HOSPITAL | Age: 69
End: 2019-11-23

## 2019-11-23 DIAGNOSIS — I51.81 TAKOTSUBO CARDIOMYOPATHY: ICD-10-CM

## 2019-11-23 DIAGNOSIS — J18.9 PNEUMONIA OF RIGHT LUNG DUE TO INFECTIOUS ORGANISM, UNSPECIFIED PART OF LUNG: ICD-10-CM

## 2019-11-23 DIAGNOSIS — R07.9 CHEST PAIN, UNSPECIFIED TYPE: Primary | ICD-10-CM

## 2019-11-23 LAB
ALBUMIN SERPL-MCNC: 4.2 G/DL (ref 3.5–5.2)
ALBUMIN/GLOB SERPL: 1.2 G/DL
ALP SERPL-CCNC: 97 U/L (ref 39–117)
ALT SERPL W P-5'-P-CCNC: 22 U/L (ref 1–33)
ANION GAP SERPL CALCULATED.3IONS-SCNC: 16 MMOL/L (ref 5–15)
AST SERPL-CCNC: 30 U/L (ref 1–32)
BASOPHILS # BLD AUTO: 0.03 10*3/MM3 (ref 0–0.2)
BASOPHILS NFR BLD AUTO: 0.2 % (ref 0–1.5)
BILIRUB SERPL-MCNC: 0.6 MG/DL (ref 0.2–1.2)
BUN BLD-MCNC: 17 MG/DL (ref 8–23)
BUN/CREAT SERPL: 13.8 (ref 7–25)
CALCIUM SPEC-SCNC: 10 MG/DL (ref 8.6–10.5)
CHLORIDE SERPL-SCNC: 101 MMOL/L (ref 98–107)
CO2 SERPL-SCNC: 25 MMOL/L (ref 22–29)
CREAT BLD-MCNC: 1.23 MG/DL (ref 0.57–1)
DEPRECATED RDW RBC AUTO: 45.8 FL (ref 37–54)
EOSINOPHIL # BLD AUTO: 0.05 10*3/MM3 (ref 0–0.4)
EOSINOPHIL NFR BLD AUTO: 0.4 % (ref 0.3–6.2)
ERYTHROCYTE [DISTWIDTH] IN BLOOD BY AUTOMATED COUNT: 13.2 % (ref 12.3–15.4)
GFR SERPL CREATININE-BSD FRML MDRD: 43 ML/MIN/1.73
GLOBULIN UR ELPH-MCNC: 3.4 GM/DL
GLUCOSE BLD-MCNC: 160 MG/DL (ref 65–99)
HCT VFR BLD AUTO: 41.5 % (ref 34–46.6)
HGB BLD-MCNC: 13.3 G/DL (ref 12–15.9)
HOLD SPECIMEN: NORMAL
HOLD SPECIMEN: NORMAL
IMM GRANULOCYTES # BLD AUTO: 0.03 10*3/MM3 (ref 0–0.05)
IMM GRANULOCYTES NFR BLD AUTO: 0.2 % (ref 0–0.5)
LIPASE SERPL-CCNC: 19 U/L (ref 13–60)
LYMPHOCYTES # BLD AUTO: 3.62 10*3/MM3 (ref 0.7–3.1)
LYMPHOCYTES NFR BLD AUTO: 30.1 % (ref 19.6–45.3)
MCH RBC QN AUTO: 30.2 PG (ref 26.6–33)
MCHC RBC AUTO-ENTMCNC: 32 G/DL (ref 31.5–35.7)
MCV RBC AUTO: 94.1 FL (ref 79–97)
MONOCYTES # BLD AUTO: 0.79 10*3/MM3 (ref 0.1–0.9)
MONOCYTES NFR BLD AUTO: 6.6 % (ref 5–12)
NEUTROPHILS # BLD AUTO: 7.51 10*3/MM3 (ref 1.7–7)
NEUTROPHILS NFR BLD AUTO: 62.5 % (ref 42.7–76)
NRBC BLD AUTO-RTO: 0 /100 WBC (ref 0–0.2)
NT-PROBNP SERPL-MCNC: 5538 PG/ML (ref 5–900)
PLATELET # BLD AUTO: 210 10*3/MM3 (ref 140–450)
PMV BLD AUTO: 12.4 FL (ref 6–12)
POTASSIUM BLD-SCNC: 4.2 MMOL/L (ref 3.5–5.2)
PROT SERPL-MCNC: 7.6 G/DL (ref 6–8.5)
RBC # BLD AUTO: 4.41 10*6/MM3 (ref 3.77–5.28)
SODIUM BLD-SCNC: 142 MMOL/L (ref 136–145)
TROPONIN T SERPL-MCNC: 0.05 NG/ML (ref 0–0.03)
WBC NRBC COR # BLD: 12.03 10*3/MM3 (ref 3.4–10.8)
WHOLE BLOOD HOLD SPECIMEN: NORMAL
WHOLE BLOOD HOLD SPECIMEN: NORMAL

## 2019-11-23 PROCEDURE — 83690 ASSAY OF LIPASE: CPT | Performed by: EMERGENCY MEDICINE

## 2019-11-23 PROCEDURE — G0378 HOSPITAL OBSERVATION PER HR: HCPCS

## 2019-11-23 PROCEDURE — 99284 EMERGENCY DEPT VISIT MOD MDM: CPT

## 2019-11-23 PROCEDURE — 96365 THER/PROPH/DIAG IV INF INIT: CPT

## 2019-11-23 PROCEDURE — 83880 ASSAY OF NATRIURETIC PEPTIDE: CPT | Performed by: EMERGENCY MEDICINE

## 2019-11-23 PROCEDURE — 85025 COMPLETE CBC W/AUTO DIFF WBC: CPT | Performed by: EMERGENCY MEDICINE

## 2019-11-23 PROCEDURE — 84484 ASSAY OF TROPONIN QUANT: CPT | Performed by: EMERGENCY MEDICINE

## 2019-11-23 PROCEDURE — 71045 X-RAY EXAM CHEST 1 VIEW: CPT

## 2019-11-23 PROCEDURE — 25010000002 CEFTRIAXONE PER 250 MG: Performed by: EMERGENCY MEDICINE

## 2019-11-23 PROCEDURE — 93005 ELECTROCARDIOGRAM TRACING: CPT

## 2019-11-23 PROCEDURE — 93005 ELECTROCARDIOGRAM TRACING: CPT | Performed by: EMERGENCY MEDICINE

## 2019-11-23 PROCEDURE — 80053 COMPREHEN METABOLIC PANEL: CPT | Performed by: EMERGENCY MEDICINE

## 2019-11-23 RX ORDER — DIPHENHYDRAMINE HYDROCHLORIDE 50 MG/ML
INJECTION INTRAMUSCULAR; INTRAVENOUS
Status: COMPLETED
Start: 2019-11-23 | End: 2019-11-24

## 2019-11-23 RX ORDER — FUROSEMIDE 10 MG/ML
40 INJECTION INTRAMUSCULAR; INTRAVENOUS ONCE
Status: COMPLETED | OUTPATIENT
Start: 2019-11-23 | End: 2019-11-24

## 2019-11-23 RX ORDER — SODIUM CHLORIDE 0.9 % (FLUSH) 0.9 %
10 SYRINGE (ML) INJECTION AS NEEDED
Status: DISCONTINUED | OUTPATIENT
Start: 2019-11-23 | End: 2019-11-26 | Stop reason: HOSPADM

## 2019-11-23 RX ORDER — ASPIRIN 81 MG/1
324 TABLET, CHEWABLE ORAL ONCE
Status: COMPLETED | OUTPATIENT
Start: 2019-11-23 | End: 2019-11-23

## 2019-11-23 RX ADMIN — CEFTRIAXONE 1 G: 1 INJECTION, POWDER, FOR SOLUTION INTRAMUSCULAR; INTRAVENOUS at 23:27

## 2019-11-23 RX ADMIN — ASPIRIN 81 MG 324 MG: 81 TABLET ORAL at 23:25

## 2019-11-24 ENCOUNTER — APPOINTMENT (OUTPATIENT)
Dept: CARDIOLOGY | Facility: HOSPITAL | Age: 69
End: 2019-11-24

## 2019-11-24 LAB
ALBUMIN SERPL-MCNC: 4.1 G/DL (ref 3.5–5.2)
ALBUMIN/GLOB SERPL: 1.1 G/DL
ALP SERPL-CCNC: 94 U/L (ref 39–117)
ALT SERPL W P-5'-P-CCNC: 21 U/L (ref 1–33)
ANION GAP SERPL CALCULATED.3IONS-SCNC: 19 MMOL/L (ref 5–15)
APTT PPP: 116.8 SECONDS (ref 85–120)
AST SERPL-CCNC: 27 U/L (ref 1–32)
BASOPHILS # BLD AUTO: 0.03 10*3/MM3 (ref 0–0.2)
BASOPHILS NFR BLD AUTO: 0.3 % (ref 0–1.5)
BILIRUB SERPL-MCNC: 0.6 MG/DL (ref 0.2–1.2)
BUN BLD-MCNC: 17 MG/DL (ref 8–23)
BUN/CREAT SERPL: 15.5 (ref 7–25)
CALCIUM SPEC-SCNC: 9.4 MG/DL (ref 8.6–10.5)
CHLORIDE SERPL-SCNC: 96 MMOL/L (ref 98–107)
CHOLEST SERPL-MCNC: 136 MG/DL (ref 0–200)
CO2 SERPL-SCNC: 24 MMOL/L (ref 22–29)
CREAT BLD-MCNC: 1.1 MG/DL (ref 0.57–1)
D-LACTATE SERPL-SCNC: 1.1 MMOL/L (ref 0.5–2)
DEPRECATED RDW RBC AUTO: 45.2 FL (ref 37–54)
EOSINOPHIL # BLD AUTO: 0.02 10*3/MM3 (ref 0–0.4)
EOSINOPHIL NFR BLD AUTO: 0.2 % (ref 0.3–6.2)
ERYTHROCYTE [DISTWIDTH] IN BLOOD BY AUTOMATED COUNT: 13.1 % (ref 12.3–15.4)
GFR SERPL CREATININE-BSD FRML MDRD: 49 ML/MIN/1.73
GLOBULIN UR ELPH-MCNC: 3.6 GM/DL
GLUCOSE BLD-MCNC: 181 MG/DL (ref 65–99)
GLUCOSE BLDC GLUCOMTR-MCNC: 147 MG/DL (ref 70–130)
GLUCOSE BLDC GLUCOMTR-MCNC: 191 MG/DL (ref 70–130)
GLUCOSE BLDC GLUCOMTR-MCNC: 194 MG/DL (ref 70–130)
GLUCOSE BLDC GLUCOMTR-MCNC: 228 MG/DL (ref 70–130)
HBA1C MFR BLD: 7.9 % (ref 4.8–5.6)
HCT VFR BLD AUTO: 41.9 % (ref 34–46.6)
HDLC SERPL-MCNC: 44 MG/DL (ref 40–60)
HGB BLD-MCNC: 13.8 G/DL (ref 12–15.9)
IMM GRANULOCYTES # BLD AUTO: 0.03 10*3/MM3 (ref 0–0.05)
IMM GRANULOCYTES NFR BLD AUTO: 0.3 % (ref 0–0.5)
INR PPP: 1.11 (ref 0.85–1.16)
LDLC SERPL CALC-MCNC: 61 MG/DL (ref 0–100)
LDLC/HDLC SERPL: 1.4 {RATIO}
LYMPHOCYTES # BLD AUTO: 3.14 10*3/MM3 (ref 0.7–3.1)
LYMPHOCYTES NFR BLD AUTO: 28.2 % (ref 19.6–45.3)
MCH RBC QN AUTO: 31.2 PG (ref 26.6–33)
MCHC RBC AUTO-ENTMCNC: 32.9 G/DL (ref 31.5–35.7)
MCV RBC AUTO: 94.8 FL (ref 79–97)
MONOCYTES # BLD AUTO: 0.83 10*3/MM3 (ref 0.1–0.9)
MONOCYTES NFR BLD AUTO: 7.5 % (ref 5–12)
NEUTROPHILS # BLD AUTO: 7.09 10*3/MM3 (ref 1.7–7)
NEUTROPHILS NFR BLD AUTO: 63.5 % (ref 42.7–76)
NRBC BLD AUTO-RTO: 0 /100 WBC (ref 0–0.2)
PLATELET # BLD AUTO: 176 10*3/MM3 (ref 140–450)
PMV BLD AUTO: 12 FL (ref 6–12)
POTASSIUM BLD-SCNC: 3.9 MMOL/L (ref 3.5–5.2)
PROCALCITONIN SERPL-MCNC: 0.06 NG/ML (ref 0.1–0.25)
PROT SERPL-MCNC: 7.7 G/DL (ref 6–8.5)
PROTHROMBIN TIME: 13.8 SECONDS (ref 11.2–14.3)
RBC # BLD AUTO: 4.42 10*6/MM3 (ref 3.77–5.28)
SODIUM BLD-SCNC: 139 MMOL/L (ref 136–145)
TRIGL SERPL-MCNC: 153 MG/DL (ref 0–150)
TROPONIN T SERPL-MCNC: 0.04 NG/ML (ref 0–0.03)
TROPONIN T SERPL-MCNC: 0.06 NG/ML (ref 0–0.03)
TROPONIN T SERPL-MCNC: 0.06 NG/ML (ref 0–0.03)
TSH SERPL DL<=0.05 MIU/L-ACNC: 5.68 UIU/ML (ref 0.27–4.2)
UFH PPP CHRO-ACNC: 0.29 IU/ML (ref 0.3–0.7)
UFH PPP CHRO-ACNC: 0.37 IU/ML (ref 0.3–0.7)
UFH PPP CHRO-ACNC: 0.72 IU/ML (ref 0.3–0.7)
VLDLC SERPL-MCNC: 30.6 MG/DL
WBC NRBC COR # BLD: 11.14 10*3/MM3 (ref 3.4–10.8)

## 2019-11-24 PROCEDURE — 87040 BLOOD CULTURE FOR BACTERIA: CPT | Performed by: EMERGENCY MEDICINE

## 2019-11-24 PROCEDURE — 84484 ASSAY OF TROPONIN QUANT: CPT | Performed by: INTERNAL MEDICINE

## 2019-11-24 PROCEDURE — 96366 THER/PROPH/DIAG IV INF ADDON: CPT

## 2019-11-24 PROCEDURE — A9270 NON-COVERED ITEM OR SERVICE: HCPCS | Performed by: INTERNAL MEDICINE

## 2019-11-24 PROCEDURE — 80053 COMPREHEN METABOLIC PANEL: CPT | Performed by: INTERNAL MEDICINE

## 2019-11-24 PROCEDURE — 25010000002 FUROSEMIDE PER 20 MG: Performed by: EMERGENCY MEDICINE

## 2019-11-24 PROCEDURE — G0378 HOSPITAL OBSERVATION PER HR: HCPCS

## 2019-11-24 PROCEDURE — 25010000002 HEPARIN (PORCINE) PER 1000 UNITS

## 2019-11-24 PROCEDURE — 96375 TX/PRO/DX INJ NEW DRUG ADDON: CPT

## 2019-11-24 PROCEDURE — 99214 OFFICE O/P EST MOD 30 MIN: CPT | Performed by: INTERNAL MEDICINE

## 2019-11-24 PROCEDURE — 80061 LIPID PANEL: CPT | Performed by: INTERNAL MEDICINE

## 2019-11-24 PROCEDURE — 85025 COMPLETE CBC W/AUTO DIFF WBC: CPT | Performed by: INTERNAL MEDICINE

## 2019-11-24 PROCEDURE — 63710000001 NITROGLYCERIN 2 % OINTMENT: Performed by: INTERNAL MEDICINE

## 2019-11-24 PROCEDURE — 93005 ELECTROCARDIOGRAM TRACING: CPT | Performed by: INTERNAL MEDICINE

## 2019-11-24 PROCEDURE — 96376 TX/PRO/DX INJ SAME DRUG ADON: CPT

## 2019-11-24 PROCEDURE — 82962 GLUCOSE BLOOD TEST: CPT

## 2019-11-24 PROCEDURE — 25010000002 ONDANSETRON PER 1 MG: Performed by: INTERNAL MEDICINE

## 2019-11-24 PROCEDURE — 85520 HEPARIN ASSAY: CPT

## 2019-11-24 PROCEDURE — 83036 HEMOGLOBIN GLYCOSYLATED A1C: CPT | Performed by: INTERNAL MEDICINE

## 2019-11-24 PROCEDURE — 83605 ASSAY OF LACTIC ACID: CPT | Performed by: EMERGENCY MEDICINE

## 2019-11-24 PROCEDURE — 85730 THROMBOPLASTIN TIME PARTIAL: CPT

## 2019-11-24 PROCEDURE — 25010000002 MORPHINE PER 10 MG: Performed by: INTERNAL MEDICINE

## 2019-11-24 PROCEDURE — 84443 ASSAY THYROID STIM HORMONE: CPT | Performed by: INTERNAL MEDICINE

## 2019-11-24 PROCEDURE — 25010000002 DIPHENHYDRAMINE PER 50 MG

## 2019-11-24 PROCEDURE — 99220 PR INITIAL OBSERVATION CARE/DAY 70 MINUTES: CPT | Performed by: INTERNAL MEDICINE

## 2019-11-24 PROCEDURE — 63710000001 INSULIN LISPRO (HUMAN) PER 5 UNITS: Performed by: INTERNAL MEDICINE

## 2019-11-24 PROCEDURE — 93306 TTE W/DOPPLER COMPLETE: CPT

## 2019-11-24 PROCEDURE — 84145 PROCALCITONIN (PCT): CPT | Performed by: INTERNAL MEDICINE

## 2019-11-24 PROCEDURE — 93010 ELECTROCARDIOGRAM REPORT: CPT | Performed by: INTERNAL MEDICINE

## 2019-11-24 PROCEDURE — 85610 PROTHROMBIN TIME: CPT

## 2019-11-24 PROCEDURE — 63710000001 PANTOPRAZOLE 40 MG TABLET DELAYED-RELEASE: Performed by: INTERNAL MEDICINE

## 2019-11-24 PROCEDURE — 93306 TTE W/DOPPLER COMPLETE: CPT | Performed by: INTERNAL MEDICINE

## 2019-11-24 RX ORDER — ONDANSETRON 2 MG/ML
4 INJECTION INTRAMUSCULAR; INTRAVENOUS EVERY 6 HOURS PRN
Status: DISCONTINUED | OUTPATIENT
Start: 2019-11-24 | End: 2019-11-26 | Stop reason: HOSPADM

## 2019-11-24 RX ORDER — MORPHINE SULFATE 2 MG/ML
2 INJECTION, SOLUTION INTRAMUSCULAR; INTRAVENOUS
Status: DISCONTINUED | OUTPATIENT
Start: 2019-11-24 | End: 2019-11-26 | Stop reason: HOSPADM

## 2019-11-24 RX ORDER — DIPHENHYDRAMINE HYDROCHLORIDE 50 MG/ML
25 INJECTION INTRAMUSCULAR; INTRAVENOUS ONCE
Status: COMPLETED | OUTPATIENT
Start: 2019-11-24 | End: 2019-11-24

## 2019-11-24 RX ORDER — FUROSEMIDE 40 MG/1
40 TABLET ORAL DAILY PRN
Status: DISCONTINUED | OUTPATIENT
Start: 2019-11-24 | End: 2019-11-26 | Stop reason: HOSPADM

## 2019-11-24 RX ORDER — DEXTROSE MONOHYDRATE 25 G/50ML
25 INJECTION, SOLUTION INTRAVENOUS
Status: DISCONTINUED | OUTPATIENT
Start: 2019-11-24 | End: 2019-11-26 | Stop reason: HOSPADM

## 2019-11-24 RX ORDER — FLUOXETINE HYDROCHLORIDE 20 MG/1
40 CAPSULE ORAL DAILY
Status: DISCONTINUED | OUTPATIENT
Start: 2019-11-24 | End: 2019-11-26 | Stop reason: HOSPADM

## 2019-11-24 RX ORDER — ACETAMINOPHEN 160 MG/5ML
650 SOLUTION ORAL EVERY 4 HOURS PRN
Status: DISCONTINUED | OUTPATIENT
Start: 2019-11-24 | End: 2019-11-26 | Stop reason: HOSPADM

## 2019-11-24 RX ORDER — FERROUS SULFATE 325(65) MG
325 TABLET ORAL
Status: DISCONTINUED | OUTPATIENT
Start: 2019-11-24 | End: 2019-11-26 | Stop reason: HOSPADM

## 2019-11-24 RX ORDER — METOPROLOL TARTRATE 50 MG/1
50 TABLET, FILM COATED ORAL EVERY 12 HOURS SCHEDULED
Status: DISCONTINUED | OUTPATIENT
Start: 2019-11-24 | End: 2019-11-24

## 2019-11-24 RX ORDER — MORPHINE SULFATE 2 MG/ML
2 INJECTION, SOLUTION INTRAMUSCULAR; INTRAVENOUS ONCE
Status: DISCONTINUED | OUTPATIENT
Start: 2019-11-24 | End: 2019-11-26 | Stop reason: HOSPADM

## 2019-11-24 RX ORDER — HEPARIN SODIUM 1000 [USP'U]/ML
4000 INJECTION, SOLUTION INTRAVENOUS; SUBCUTANEOUS ONCE
Status: COMPLETED | OUTPATIENT
Start: 2019-11-24 | End: 2019-11-24

## 2019-11-24 RX ORDER — NICOTINE POLACRILEX 4 MG
15 LOZENGE BUCCAL
Status: DISCONTINUED | OUTPATIENT
Start: 2019-11-24 | End: 2019-11-26 | Stop reason: HOSPADM

## 2019-11-24 RX ORDER — PANTOPRAZOLE SODIUM 40 MG/1
40 TABLET, DELAYED RELEASE ORAL
Status: DISCONTINUED | OUTPATIENT
Start: 2019-11-24 | End: 2019-11-26 | Stop reason: HOSPADM

## 2019-11-24 RX ORDER — CARVEDILOL 12.5 MG/1
12.5 TABLET ORAL 2 TIMES DAILY WITH MEALS
Status: DISCONTINUED | OUTPATIENT
Start: 2019-11-24 | End: 2019-11-26 | Stop reason: HOSPADM

## 2019-11-24 RX ORDER — ACETAMINOPHEN 325 MG/1
650 TABLET ORAL EVERY 4 HOURS PRN
Status: DISCONTINUED | OUTPATIENT
Start: 2019-11-24 | End: 2019-11-26 | Stop reason: HOSPADM

## 2019-11-24 RX ORDER — ACETAMINOPHEN 650 MG/1
650 SUPPOSITORY RECTAL EVERY 4 HOURS PRN
Status: DISCONTINUED | OUTPATIENT
Start: 2019-11-24 | End: 2019-11-26 | Stop reason: HOSPADM

## 2019-11-24 RX ORDER — POTASSIUM CHLORIDE 750 MG/1
20 CAPSULE, EXTENDED RELEASE ORAL DAILY
Status: DISCONTINUED | OUTPATIENT
Start: 2019-11-24 | End: 2019-11-26 | Stop reason: HOSPADM

## 2019-11-24 RX ORDER — LEVOFLOXACIN 5 MG/ML
750 INJECTION, SOLUTION INTRAVENOUS
Status: DISCONTINUED | OUTPATIENT
Start: 2019-11-25 | End: 2019-11-24

## 2019-11-24 RX ORDER — ATORVASTATIN CALCIUM 40 MG/1
80 TABLET, FILM COATED ORAL NIGHTLY
Status: DISCONTINUED | OUTPATIENT
Start: 2019-11-24 | End: 2019-11-26 | Stop reason: HOSPADM

## 2019-11-24 RX ORDER — LOSARTAN POTASSIUM 50 MG/1
50 TABLET ORAL NIGHTLY
Status: DISCONTINUED | OUTPATIENT
Start: 2019-11-25 | End: 2019-11-25

## 2019-11-24 RX ORDER — HEPARIN SODIUM 10000 [USP'U]/100ML
11 INJECTION, SOLUTION INTRAVENOUS
Status: DISCONTINUED | OUTPATIENT
Start: 2019-11-24 | End: 2019-11-25

## 2019-11-24 RX ADMIN — MORPHINE SULFATE 2 MG: 2 INJECTION, SOLUTION INTRAMUSCULAR; INTRAVENOUS at 17:51

## 2019-11-24 RX ADMIN — HEPARIN SODIUM 4000 UNITS: 1000 INJECTION INTRAVENOUS; SUBCUTANEOUS at 00:52

## 2019-11-24 RX ADMIN — ATORVASTATIN CALCIUM 80 MG: 40 TABLET, FILM COATED ORAL at 22:05

## 2019-11-24 RX ADMIN — MORPHINE SULFATE 2 MG: 2 INJECTION, SOLUTION INTRAMUSCULAR; INTRAVENOUS at 05:49

## 2019-11-24 RX ADMIN — NITROGLYCERIN 0.5 INCH: 20 OINTMENT TOPICAL at 06:00

## 2019-11-24 RX ADMIN — POTASSIUM CHLORIDE 20 MEQ: 750 CAPSULE, EXTENDED RELEASE ORAL at 08:48

## 2019-11-24 RX ADMIN — Medication 1 TABLET: at 08:48

## 2019-11-24 RX ADMIN — METOPROLOL TARTRATE 50 MG: 50 TABLET, FILM COATED ORAL at 08:48

## 2019-11-24 RX ADMIN — MORPHINE SULFATE 2 MG: 2 INJECTION, SOLUTION INTRAMUSCULAR; INTRAVENOUS at 02:56

## 2019-11-24 RX ADMIN — INSULIN LISPRO 3 UNITS: 100 INJECTION, SOLUTION INTRAVENOUS; SUBCUTANEOUS at 11:58

## 2019-11-24 RX ADMIN — PANTOPRAZOLE SODIUM 40 MG: 40 TABLET, DELAYED RELEASE ORAL at 06:01

## 2019-11-24 RX ADMIN — NITROGLYCERIN 0.5 INCH: 20 OINTMENT TOPICAL at 17:51

## 2019-11-24 RX ADMIN — INSULIN LISPRO 2 UNITS: 100 INJECTION, SOLUTION INTRAVENOUS; SUBCUTANEOUS at 08:48

## 2019-11-24 RX ADMIN — ONDANSETRON 4 MG: 2 INJECTION INTRAMUSCULAR; INTRAVENOUS at 14:16

## 2019-11-24 RX ADMIN — NITROGLYCERIN 0.5 INCH: 20 OINTMENT TOPICAL at 11:59

## 2019-11-24 RX ADMIN — HEPARIN SODIUM 9.7 UNITS/KG/HR: 10000 INJECTION, SOLUTION INTRAVENOUS at 00:53

## 2019-11-24 RX ADMIN — FERROUS SULFATE TAB 325 MG (65 MG ELEMENTAL FE) 325 MG: 325 (65 FE) TAB at 08:48

## 2019-11-24 RX ADMIN — CARVEDILOL 12.5 MG: 12.5 TABLET, FILM COATED ORAL at 17:47

## 2019-11-24 RX ADMIN — INSULIN LISPRO 2 UNITS: 100 INJECTION, SOLUTION INTRAVENOUS; SUBCUTANEOUS at 22:10

## 2019-11-24 RX ADMIN — DOXYCYCLINE 100 MG: 100 INJECTION, POWDER, LYOPHILIZED, FOR SOLUTION INTRAVENOUS at 17:47

## 2019-11-24 RX ADMIN — NITROGLYCERIN 0.5 INCH: 20 OINTMENT TOPICAL at 00:40

## 2019-11-24 RX ADMIN — DIPHENHYDRAMINE HYDROCHLORIDE 25 MG: 50 INJECTION INTRAMUSCULAR; INTRAVENOUS at 00:05

## 2019-11-24 RX ADMIN — DOXYCYCLINE 100 MG: 100 INJECTION, POWDER, LYOPHILIZED, FOR SOLUTION INTRAVENOUS at 05:50

## 2019-11-24 RX ADMIN — FUROSEMIDE 40 MG: 10 INJECTION, SOLUTION INTRAMUSCULAR; INTRAVENOUS at 00:06

## 2019-11-24 RX ADMIN — FLUOXETINE HYDROCHLORIDE 40 MG: 20 CAPSULE ORAL at 08:48

## 2019-11-25 LAB
ANION GAP SERPL CALCULATED.3IONS-SCNC: 16 MMOL/L (ref 5–15)
BH CV ECHO MEAS - AO MAX PG (FULL): 6.9 MMHG
BH CV ECHO MEAS - AO MAX PG: 10.2 MMHG
BH CV ECHO MEAS - AO MEAN PG (FULL): 3.7 MMHG
BH CV ECHO MEAS - AO MEAN PG: 5.5 MMHG
BH CV ECHO MEAS - AO ROOT AREA (BSA CORRECTED): 1.3
BH CV ECHO MEAS - AO ROOT AREA: 6.1 CM^2
BH CV ECHO MEAS - AO ROOT DIAM: 2.8 CM
BH CV ECHO MEAS - AO V2 MAX: 159.8 CM/SEC
BH CV ECHO MEAS - AO V2 MEAN: 108.2 CM/SEC
BH CV ECHO MEAS - AO V2 VTI: 39.6 CM
BH CV ECHO MEAS - AVA(I,A): 1.6 CM^2
BH CV ECHO MEAS - AVA(I,D): 1.6 CM^2
BH CV ECHO MEAS - AVA(V,A): 1.8 CM^2
BH CV ECHO MEAS - AVA(V,D): 1.8 CM^2
BH CV ECHO MEAS - BSA(HAYCOCK): 2.2 M^2
BH CV ECHO MEAS - BSA: 2.1 M^2
BH CV ECHO MEAS - BZI_BMI: 36.5 KILOGRAMS/M^2
BH CV ECHO MEAS - BZI_METRIC_HEIGHT: 167.6 CM
BH CV ECHO MEAS - BZI_METRIC_WEIGHT: 102.5 KG
BH CV ECHO MEAS - EDV(CUBED): 130.1 ML
BH CV ECHO MEAS - EDV(MOD-SP2): 99 ML
BH CV ECHO MEAS - EDV(MOD-SP4): 94 ML
BH CV ECHO MEAS - EDV(TEICH): 122 ML
BH CV ECHO MEAS - EF(CUBED): 47.6 %
BH CV ECHO MEAS - EF(MOD-SP2): 54.5 %
BH CV ECHO MEAS - EF(MOD-SP4): 40.4 %
BH CV ECHO MEAS - EF(TEICH): 39.7 %
BH CV ECHO MEAS - ESV(CUBED): 68.2 ML
BH CV ECHO MEAS - ESV(MOD-SP2): 45 ML
BH CV ECHO MEAS - ESV(MOD-SP4): 56 ML
BH CV ECHO MEAS - ESV(TEICH): 73.6 ML
BH CV ECHO MEAS - FS: 19.4 %
BH CV ECHO MEAS - IVS/LVPW: 0.66
BH CV ECHO MEAS - IVSD: 0.73 CM
BH CV ECHO MEAS - LA DIMENSION: 4.3 CM
BH CV ECHO MEAS - LA/AO: 1.6
BH CV ECHO MEAS - LAD MAJOR: 5.1 CM
BH CV ECHO MEAS - LAT PEAK E' VEL: 4.4 CM/SEC
BH CV ECHO MEAS - LATERAL E/E' RATIO: 14.4
BH CV ECHO MEAS - LV DIASTOLIC VOL/BSA (35-75): 44.6 ML/M^2
BH CV ECHO MEAS - LV MASS(C)D: 165.8 GRAMS
BH CV ECHO MEAS - LV MASS(C)DI: 78.7 GRAMS/M^2
BH CV ECHO MEAS - LV MAX PG: 3.3 MMHG
BH CV ECHO MEAS - LV MEAN PG: 1.7 MMHG
BH CV ECHO MEAS - LV SYSTOLIC VOL/BSA (12-30): 26.6 ML/M^2
BH CV ECHO MEAS - LV V1 MAX: 90.5 CM/SEC
BH CV ECHO MEAS - LV V1 MEAN: 60.8 CM/SEC
BH CV ECHO MEAS - LV V1 VTI: 19.7 CM
BH CV ECHO MEAS - LVIDD: 5.1 CM
BH CV ECHO MEAS - LVIDS: 4.1 CM
BH CV ECHO MEAS - LVLD AP2: 7.4 CM
BH CV ECHO MEAS - LVLD AP4: 7.6 CM
BH CV ECHO MEAS - LVLS AP2: 6.3 CM
BH CV ECHO MEAS - LVLS AP4: 7.5 CM
BH CV ECHO MEAS - LVOT AREA (M): 3.1 CM^2
BH CV ECHO MEAS - LVOT AREA: 3.2 CM^2
BH CV ECHO MEAS - LVOT DIAM: 2 CM
BH CV ECHO MEAS - LVPWD: 1.1 CM
BH CV ECHO MEAS - MED PEAK E' VEL: 4.3 CM/SEC
BH CV ECHO MEAS - MEDIAL E/E' RATIO: 14.8
BH CV ECHO MEAS - MV A MAX VEL: 111.7 CM/SEC
BH CV ECHO MEAS - MV DEC TIME: 0.2 SEC
BH CV ECHO MEAS - MV E MAX VEL: 65.4 CM/SEC
BH CV ECHO MEAS - MV E/A: 0.59
BH CV ECHO MEAS - PA ACC SLOPE: 805.1 CM/SEC^2
BH CV ECHO MEAS - PA ACC TIME: 0.13 SEC
BH CV ECHO MEAS - PA MAX PG: 5.3 MMHG
BH CV ECHO MEAS - PA MEAN PG: 2.4 MMHG
BH CV ECHO MEAS - PA PR(ACCEL): 20.4 MMHG
BH CV ECHO MEAS - PA V2 MAX: 114.5 CM/SEC
BH CV ECHO MEAS - PA V2 MEAN: 73.8 CM/SEC
BH CV ECHO MEAS - PA V2 VTI: 23.1 CM
BH CV ECHO MEAS - SI(AO): 113.9 ML/M^2
BH CV ECHO MEAS - SI(CUBED): 29.4 ML/M^2
BH CV ECHO MEAS - SI(LVOT): 29.5 ML/M^2
BH CV ECHO MEAS - SI(MOD-SP2): 25.6 ML/M^2
BH CV ECHO MEAS - SI(MOD-SP4): 18 ML/M^2
BH CV ECHO MEAS - SI(TEICH): 23 ML/M^2
BH CV ECHO MEAS - SV(AO): 240 ML
BH CV ECHO MEAS - SV(CUBED): 62 ML
BH CV ECHO MEAS - SV(LVOT): 62.1 ML
BH CV ECHO MEAS - SV(MOD-SP2): 54 ML
BH CV ECHO MEAS - SV(MOD-SP4): 38 ML
BH CV ECHO MEAS - SV(TEICH): 48.4 ML
BH CV ECHO MEAS - TAPSE (>1.6): 2.5 CM2
BH CV ECHO MEASUREMENTS AVERAGE E/E' RATIO: 15.03
BH CV VAS BP RIGHT ARM: NORMAL MMHG
BH CV XLRA - RV BASE: 3.8 CM
BH CV XLRA - RV LENGTH: 7.7 CM
BH CV XLRA - RV MID: 2.7 CM
BH CV XLRA - TDI S': 17.7 CM/SEC
BUN BLD-MCNC: 15 MG/DL (ref 8–23)
BUN/CREAT SERPL: 15.6 (ref 7–25)
CALCIUM SPEC-SCNC: 9.9 MG/DL (ref 8.6–10.5)
CHLORIDE SERPL-SCNC: 96 MMOL/L (ref 98–107)
CO2 SERPL-SCNC: 25 MMOL/L (ref 22–29)
CREAT BLD-MCNC: 0.96 MG/DL (ref 0.57–1)
DEPRECATED RDW RBC AUTO: 46.5 FL (ref 37–54)
ERYTHROCYTE [DISTWIDTH] IN BLOOD BY AUTOMATED COUNT: 13.2 % (ref 12.3–15.4)
GFR SERPL CREATININE-BSD FRML MDRD: 57 ML/MIN/1.73
GLUCOSE BLD-MCNC: 159 MG/DL (ref 65–99)
GLUCOSE BLDC GLUCOMTR-MCNC: 150 MG/DL (ref 70–130)
GLUCOSE BLDC GLUCOMTR-MCNC: 164 MG/DL (ref 70–130)
GLUCOSE BLDC GLUCOMTR-MCNC: 200 MG/DL (ref 70–130)
GLUCOSE BLDC GLUCOMTR-MCNC: 216 MG/DL (ref 70–130)
HCT VFR BLD AUTO: 40.7 % (ref 34–46.6)
HGB BLD-MCNC: 12.9 G/DL (ref 12–15.9)
LEFT ATRIUM VOLUME INDEX: 23.3 ML/M^2
LEFT ATRIUM VOLUME: 49 ML
LV EF 2D ECHO EST: 20 %
MAXIMAL PREDICTED HEART RATE: 150 BPM
MCH RBC QN AUTO: 30.4 PG (ref 26.6–33)
MCHC RBC AUTO-ENTMCNC: 31.7 G/DL (ref 31.5–35.7)
MCV RBC AUTO: 95.8 FL (ref 79–97)
PLATELET # BLD AUTO: 181 10*3/MM3 (ref 140–450)
PMV BLD AUTO: 12.2 FL (ref 6–12)
POTASSIUM BLD-SCNC: 4 MMOL/L (ref 3.5–5.2)
RBC # BLD AUTO: 4.25 10*6/MM3 (ref 3.77–5.28)
SODIUM BLD-SCNC: 137 MMOL/L (ref 136–145)
STRESS TARGET HR: 128 BPM
UFH PPP CHRO-ACNC: 0.31 IU/ML (ref 0.3–0.7)
UFH PPP CHRO-ACNC: 0.37 IU/ML (ref 0.3–0.7)
WBC NRBC COR # BLD: 10.44 10*3/MM3 (ref 3.4–10.8)

## 2019-11-25 PROCEDURE — 85027 COMPLETE CBC AUTOMATED: CPT | Performed by: INTERNAL MEDICINE

## 2019-11-25 PROCEDURE — 96366 THER/PROPH/DIAG IV INF ADDON: CPT

## 2019-11-25 PROCEDURE — 25010000002 HEPARIN (PORCINE) PER 1000 UNITS

## 2019-11-25 PROCEDURE — 96372 THER/PROPH/DIAG INJ SC/IM: CPT

## 2019-11-25 PROCEDURE — 80048 BASIC METABOLIC PNL TOTAL CA: CPT | Performed by: INTERNAL MEDICINE

## 2019-11-25 PROCEDURE — G0378 HOSPITAL OBSERVATION PER HR: HCPCS

## 2019-11-25 PROCEDURE — 96375 TX/PRO/DX INJ NEW DRUG ADDON: CPT

## 2019-11-25 PROCEDURE — 99226 PR SBSQ OBSERVATION CARE/DAY 35 MINUTES: CPT | Performed by: INTERNAL MEDICINE

## 2019-11-25 PROCEDURE — 82962 GLUCOSE BLOOD TEST: CPT

## 2019-11-25 PROCEDURE — 96376 TX/PRO/DX INJ SAME DRUG ADON: CPT

## 2019-11-25 PROCEDURE — 25010000002 HEPARIN (PORCINE) PER 1000 UNITS: Performed by: INTERNAL MEDICINE

## 2019-11-25 PROCEDURE — 85520 HEPARIN ASSAY: CPT

## 2019-11-25 PROCEDURE — 25010000002 MORPHINE PER 10 MG: Performed by: INTERNAL MEDICINE

## 2019-11-25 PROCEDURE — 99233 SBSQ HOSP IP/OBS HIGH 50: CPT | Performed by: NURSE PRACTITIONER

## 2019-11-25 RX ORDER — LOSARTAN POTASSIUM 50 MG/1
100 TABLET ORAL NIGHTLY
Status: DISCONTINUED | OUTPATIENT
Start: 2019-11-25 | End: 2019-11-26 | Stop reason: HOSPADM

## 2019-11-25 RX ORDER — FAMOTIDINE 10 MG/ML
20 INJECTION, SOLUTION INTRAVENOUS ONCE
Status: COMPLETED | OUTPATIENT
Start: 2019-11-25 | End: 2019-11-25

## 2019-11-25 RX ORDER — HEPARIN SODIUM 5000 [USP'U]/ML
5000 INJECTION, SOLUTION INTRAVENOUS; SUBCUTANEOUS EVERY 8 HOURS SCHEDULED
Status: DISCONTINUED | OUTPATIENT
Start: 2019-11-25 | End: 2019-11-26 | Stop reason: HOSPADM

## 2019-11-25 RX ORDER — FAMOTIDINE 10 MG/ML
20 INJECTION, SOLUTION INTRAVENOUS EVERY 12 HOURS SCHEDULED
Status: CANCELLED | OUTPATIENT
Start: 2019-11-25

## 2019-11-25 RX ORDER — FAMOTIDINE 10 MG/ML
20 INJECTION, SOLUTION INTRAVENOUS EVERY 12 HOURS SCHEDULED
Status: DISCONTINUED | OUTPATIENT
Start: 2019-11-25 | End: 2019-11-25

## 2019-11-25 RX ORDER — DOXYCYCLINE 100 MG/1
100 CAPSULE ORAL EVERY 12 HOURS SCHEDULED
Status: DISCONTINUED | OUTPATIENT
Start: 2019-11-25 | End: 2019-11-26 | Stop reason: HOSPADM

## 2019-11-25 RX ADMIN — FLUOXETINE HYDROCHLORIDE 40 MG: 20 CAPSULE ORAL at 08:33

## 2019-11-25 RX ADMIN — ATORVASTATIN CALCIUM 80 MG: 40 TABLET, FILM COATED ORAL at 20:43

## 2019-11-25 RX ADMIN — HEPARIN SODIUM 5000 UNITS: 5000 INJECTION, SOLUTION INTRAVENOUS; SUBCUTANEOUS at 14:23

## 2019-11-25 RX ADMIN — FERROUS SULFATE TAB 325 MG (65 MG ELEMENTAL FE) 325 MG: 325 (65 FE) TAB at 08:33

## 2019-11-25 RX ADMIN — NITROGLYCERIN 0.5 INCH: 20 OINTMENT TOPICAL at 06:00

## 2019-11-25 RX ADMIN — CARVEDILOL 12.5 MG: 12.5 TABLET, FILM COATED ORAL at 17:32

## 2019-11-25 RX ADMIN — POTASSIUM CHLORIDE 20 MEQ: 750 CAPSULE, EXTENDED RELEASE ORAL at 08:33

## 2019-11-25 RX ADMIN — INSULIN LISPRO 3 UNITS: 100 INJECTION, SOLUTION INTRAVENOUS; SUBCUTANEOUS at 21:04

## 2019-11-25 RX ADMIN — HEPARIN SODIUM 11 UNITS/KG/HR: 10000 INJECTION, SOLUTION INTRAVENOUS at 00:10

## 2019-11-25 RX ADMIN — DOXYCYCLINE 100 MG: 100 INJECTION, POWDER, LYOPHILIZED, FOR SOLUTION INTRAVENOUS at 04:08

## 2019-11-25 RX ADMIN — CARVEDILOL 12.5 MG: 12.5 TABLET, FILM COATED ORAL at 08:33

## 2019-11-25 RX ADMIN — INSULIN LISPRO 3 UNITS: 100 INJECTION, SOLUTION INTRAVENOUS; SUBCUTANEOUS at 11:54

## 2019-11-25 RX ADMIN — HEPARIN SODIUM 5000 UNITS: 5000 INJECTION, SOLUTION INTRAVENOUS; SUBCUTANEOUS at 20:43

## 2019-11-25 RX ADMIN — LOSARTAN POTASSIUM 100 MG: 50 TABLET, FILM COATED ORAL at 20:42

## 2019-11-25 RX ADMIN — NITROGLYCERIN 0.5 INCH: 20 OINTMENT TOPICAL at 00:11

## 2019-11-25 RX ADMIN — MORPHINE SULFATE 2 MG: 2 INJECTION, SOLUTION INTRAMUSCULAR; INTRAVENOUS at 04:10

## 2019-11-25 RX ADMIN — FAMOTIDINE 20 MG: 10 INJECTION, SOLUTION INTRAVENOUS at 21:11

## 2019-11-25 RX ADMIN — DOXYCYCLINE 100 MG: 100 CAPSULE ORAL at 20:42

## 2019-11-25 RX ADMIN — INSULIN LISPRO 2 UNITS: 100 INJECTION, SOLUTION INTRAVENOUS; SUBCUTANEOUS at 17:32

## 2019-11-25 RX ADMIN — SODIUM CHLORIDE, PRESERVATIVE FREE 10 ML: 5 INJECTION INTRAVENOUS at 08:33

## 2019-11-25 RX ADMIN — PANTOPRAZOLE SODIUM 40 MG: 40 TABLET, DELAYED RELEASE ORAL at 04:07

## 2019-11-25 RX ADMIN — SODIUM CHLORIDE, PRESERVATIVE FREE 10 ML: 5 INJECTION INTRAVENOUS at 20:44

## 2019-11-26 VITALS
TEMPERATURE: 98.2 F | OXYGEN SATURATION: 97 % | WEIGHT: 227.07 LBS | RESPIRATION RATE: 18 BRPM | BODY MASS INDEX: 36.49 KG/M2 | HEIGHT: 66 IN | SYSTOLIC BLOOD PRESSURE: 127 MMHG | HEART RATE: 80 BPM | DIASTOLIC BLOOD PRESSURE: 76 MMHG

## 2019-11-26 PROBLEM — R07.9 CHEST PAIN: Status: RESOLVED | Noted: 2019-11-23 | Resolved: 2019-11-26

## 2019-11-26 LAB
ANION GAP SERPL CALCULATED.3IONS-SCNC: 15 MMOL/L (ref 5–15)
BUN BLD-MCNC: 13 MG/DL (ref 8–23)
BUN/CREAT SERPL: 15.1 (ref 7–25)
CALCIUM SPEC-SCNC: 9.9 MG/DL (ref 8.6–10.5)
CHLORIDE SERPL-SCNC: 98 MMOL/L (ref 98–107)
CO2 SERPL-SCNC: 23 MMOL/L (ref 22–29)
CREAT BLD-MCNC: 0.86 MG/DL (ref 0.57–1)
DEPRECATED RDW RBC AUTO: 45.4 FL (ref 37–54)
ERYTHROCYTE [DISTWIDTH] IN BLOOD BY AUTOMATED COUNT: 13 % (ref 12.3–15.4)
GFR SERPL CREATININE-BSD FRML MDRD: 65 ML/MIN/1.73
GLUCOSE BLD-MCNC: 189 MG/DL (ref 65–99)
GLUCOSE BLDC GLUCOMTR-MCNC: 167 MG/DL (ref 70–130)
GLUCOSE BLDC GLUCOMTR-MCNC: 210 MG/DL (ref 70–130)
HCT VFR BLD AUTO: 40.5 % (ref 34–46.6)
HGB BLD-MCNC: 13 G/DL (ref 12–15.9)
MCH RBC QN AUTO: 30.6 PG (ref 26.6–33)
MCHC RBC AUTO-ENTMCNC: 32.1 G/DL (ref 31.5–35.7)
MCV RBC AUTO: 95.3 FL (ref 79–97)
PLATELET # BLD AUTO: 161 10*3/MM3 (ref 140–450)
PMV BLD AUTO: 12.4 FL (ref 6–12)
POTASSIUM BLD-SCNC: 4.3 MMOL/L (ref 3.5–5.2)
RBC # BLD AUTO: 4.25 10*6/MM3 (ref 3.77–5.28)
SODIUM BLD-SCNC: 136 MMOL/L (ref 136–145)
WBC NRBC COR # BLD: 10.01 10*3/MM3 (ref 3.4–10.8)

## 2019-11-26 PROCEDURE — 85027 COMPLETE CBC AUTOMATED: CPT | Performed by: INTERNAL MEDICINE

## 2019-11-26 PROCEDURE — 99232 SBSQ HOSP IP/OBS MODERATE 35: CPT | Performed by: INTERNAL MEDICINE

## 2019-11-26 PROCEDURE — 25010000002 HEPARIN (PORCINE) PER 1000 UNITS: Performed by: INTERNAL MEDICINE

## 2019-11-26 PROCEDURE — 99217 PR OBSERVATION CARE DISCHARGE MANAGEMENT: CPT | Performed by: INTERNAL MEDICINE

## 2019-11-26 PROCEDURE — 80048 BASIC METABOLIC PNL TOTAL CA: CPT | Performed by: INTERNAL MEDICINE

## 2019-11-26 PROCEDURE — G0378 HOSPITAL OBSERVATION PER HR: HCPCS

## 2019-11-26 PROCEDURE — 96372 THER/PROPH/DIAG INJ SC/IM: CPT

## 2019-11-26 PROCEDURE — 82962 GLUCOSE BLOOD TEST: CPT

## 2019-11-26 RX ORDER — LOSARTAN POTASSIUM 100 MG/1
100 TABLET ORAL NIGHTLY
Qty: 30 TABLET | Refills: 0 | Status: SHIPPED | OUTPATIENT
Start: 2019-11-26 | End: 2019-12-05 | Stop reason: SDUPTHER

## 2019-11-26 RX ORDER — CARVEDILOL 25 MG/1
25 TABLET ORAL 2 TIMES DAILY WITH MEALS
Qty: 60 TABLET | Refills: 11 | Status: SHIPPED | OUTPATIENT
Start: 2019-11-26 | End: 2019-11-27

## 2019-11-26 RX ORDER — CARVEDILOL 12.5 MG/1
12.5 TABLET ORAL 2 TIMES DAILY WITH MEALS
Qty: 60 TABLET | Refills: 0 | Status: SHIPPED | OUTPATIENT
Start: 2019-11-26 | End: 2019-11-26

## 2019-11-26 RX ORDER — DOXYCYCLINE 100 MG/1
100 CAPSULE ORAL EVERY 12 HOURS SCHEDULED
Qty: 10 CAPSULE | Refills: 0 | Status: SHIPPED | OUTPATIENT
Start: 2019-11-26 | End: 2019-12-01

## 2019-11-26 RX ADMIN — POTASSIUM CHLORIDE 20 MEQ: 750 CAPSULE, EXTENDED RELEASE ORAL at 08:39

## 2019-11-26 RX ADMIN — INSULIN LISPRO 2 UNITS: 100 INJECTION, SOLUTION INTRAVENOUS; SUBCUTANEOUS at 08:39

## 2019-11-26 RX ADMIN — FLUOXETINE HYDROCHLORIDE 40 MG: 20 CAPSULE ORAL at 08:39

## 2019-11-26 RX ADMIN — FERROUS SULFATE TAB 325 MG (65 MG ELEMENTAL FE) 325 MG: 325 (65 FE) TAB at 08:39

## 2019-11-26 RX ADMIN — Medication 1 TABLET: at 08:39

## 2019-11-26 RX ADMIN — DOXYCYCLINE 100 MG: 100 CAPSULE ORAL at 08:40

## 2019-11-26 RX ADMIN — HEPARIN SODIUM 5000 UNITS: 5000 INJECTION, SOLUTION INTRAVENOUS; SUBCUTANEOUS at 05:35

## 2019-11-26 RX ADMIN — CARVEDILOL 12.5 MG: 12.5 TABLET, FILM COATED ORAL at 08:39

## 2019-11-26 RX ADMIN — PANTOPRAZOLE SODIUM 40 MG: 40 TABLET, DELAYED RELEASE ORAL at 05:31

## 2019-11-27 RX ORDER — CARVEDILOL 25 MG/1
25 TABLET ORAL 2 TIMES DAILY WITH MEALS
Qty: 60 TABLET | Refills: 11 | Status: SHIPPED | OUTPATIENT
Start: 2019-11-27 | End: 2020-08-05 | Stop reason: SDUPTHER

## 2019-11-29 LAB
BACTERIA SPEC AEROBE CULT: NORMAL

## 2019-12-04 NOTE — PROGRESS NOTES
"[facility]  [facility]      Encounter Date:12/05/2019     Micaela KWON [id number]  [unfilled]    1950    Katya Howard MD    Micaela Mc is a 69 y.o. female.      Subjective:     Chief Complaint:  Congestive Heart Failure (New patient, takotsubo cardiomyopathy)       HPI   Patient is a 69-year-old female with past medical history significant for Takotsubo cardiomyopathy, hypertension, type 2 diabetes, hyperlipidemia and GERD who presents to the heart valve center as hospital referral for takotsubo cardiomyopathy.  Patient presented to the ED 11/23 with complaints of chest pain.  She had a mild troponin elevation with no ischemic changes on EKG.  She has a history of normal coronaries per heart cath in February 2019.  Repeat echo during this admission showed an EF of 20% and findings consistent with takotsubo cardiomyopathy.  Also had possible right upper lobe and right lower lobe pneumonia was treated with doxycycline.  Her metoprolol was changed to carvedilol and losartan increased to 100mg.  She reports shortness of breath has resolved.  She has had some lightheadedness and feeling \"woozy\" at times.  She notes systolic blood pressures as low as 80-90 at home.  She has been taking Lasix for the last couple of days because her weight was up 2 pounds.  Denies swelling, shortness of breath orthopnea.  She has a lot of anxiety surrounding her recent stressors at home.  She is a previous psychiatric nurse and feels that her anxiety is stable right now.  Recently was started on BuSpar    She has a history of Takotsubo cardiomyopathy in February 2019 with a EF of 21 to 25%, echo 2/21 showed normal LVEF of 50%.       Patient Active Problem List   Diagnosis   • Acute upper GI bleeding   • Orthostatic hypotension   • Type 2 diabetes mellitus, without long-term current use of insulin (CMS/[facility])   • GERD (gastroesophageal reflux disease)   • " Hypertension   • Hyperlipidemia   • Acute respiratory failure with hypoxia and hypercapnia (CMS/HCC)   • Takotsubo cardiomyopathy       Past Medical History:   Diagnosis Date   • Anemia    • CHF (congestive heart failure) (CMS/HCC)    • Coronary artery disease    • Diabetes mellitus (CMS/HCC)    • GERD (gastroesophageal reflux disease)    • Hyperlipidemia    • Hypertension        Past Surgical History:   Procedure Laterality Date   • APPENDECTOMY     • BREAST CYST ASPIRATION  1999    pt doesn't remember which breast   • CARDIAC CATHETERIZATION N/A 2/18/2019    Procedure: LEFT HEART CATH;  Surgeon: Martir Cutler MD;  Location:  [facility];  Service: Cardiovascular   • ENDOSCOPY N/A 5/15/2017    Procedure: ESOPHAGOGASTRODUODENOSCOPY;  Surgeon: Jennifer Ingram MD;  Location:  [facility] ENDOSCOPY;  Service:    • HYSTERECTOMY      age 41   • LAPAROSCOPIC GASTRIC BANDING         Family History   Problem Relation Age of Onset   • Breast cancer Neg Hx    • Colon cancer Neg Hx    • Endometrial cancer Neg Hx    • Ovarian cancer Neg Hx        Social History     Socioeconomic History   • Marital status:      Spouse name: Not on file   • Number of children: Not on file   • Years of education: Not on file   • Highest education level: Not on file   Tobacco Use   • Smoking status: Never Smoker   • Smokeless tobacco: Never Used   Substance and Sexual Activity   • Alcohol use: Yes     Comment: OCCASIONALLY    • Drug use: No   • Sexual activity: Defer       Allergies   Allergen Reactions   • Codeine Itching   • Rocephin [Ceftriaxone] Itching     Has tolerated amoxicillin in past         Current Outpatient Medications:   •  atorvastatin (LIPITOR) 80 MG tablet, Take 1 tablet by mouth Every Night., Disp: 30 tablet, Rfl: 1  •  busPIRone (BUSPAR) 5 MG tablet, Take 2.5 mg by mouth Daily., Disp: , Rfl:   •  carvedilol (COREG) 25 MG tablet, Take 1 tablet by mouth 2 (Two) Times a Day With Meals., Disp: 60 tablet, Rfl:  11  •  Cholecalciferol (VITAMIN D3) 50 MCG (2000 UT) capsule, Take 2,000 Units by mouth Daily., Disp: , Rfl:   •  ferrous sulfate 324 (65 FE) MG tablet delayed-release EC tablet, Take 324 mg by mouth Daily With Breakfast., Disp: , Rfl:   •  FLUoxetine (PROzac) 20 MG capsule, Take 40 mg by mouth Daily., Disp: , Rfl:   •  furosemide (LASIX) 40 MG tablet, Take 1 tablet by mouth Daily As Needed (edema)., Disp: 30 tablet, Rfl: 1  •  glimepiride (AMARYL) 1 MG tablet, Take 1 mg by mouth Every Morning Before Breakfast., Disp: , Rfl:   •  hyoscyamine (LEVSIN) 0.125 MG SL tablet, Take 0.125 mg by mouth Every 4 (Four) Hours As Needed for Cramping., Disp: , Rfl:   •  lansoprazole (PREVACID) 30 MG capsule, Take 30 mg by mouth Daily., Disp: , Rfl:   •  losartan (COZAAR) 50 MG tablet, Take 1 tablet by mouth Every Night., Disp: 30 tablet, Rfl: 3  •  metFORMIN (GLUCOPHAGE) 500 MG tablet, Take 500 mg by mouth 2 (Two) Times a Day With Meals., Disp: , Rfl:   •  potassium chloride (K-DUR,KLOR-CON) 20 MEQ CR tablet, Take 1 tablet by mouth Daily As Needed (edema)., Disp: 30 tablet, Rfl: 1    The following portions of the patient's history were reviewed today and updated as appropriate: allergies, current medications, past family history, past medical history, past social history, past surgical history and problem list     Review of Systems   Constitution: Positive for weakness. Negative for chills and fever.   HENT: Negative.    Eyes: Negative.    Cardiovascular: Negative for chest pain, claudication, cyanosis, dyspnea on exertion, irregular heartbeat, leg swelling, near-syncope, orthopnea, palpitations, paroxysmal nocturnal dyspnea and syncope.   Respiratory: Negative for cough, shortness of breath and snoring.    Endocrine: Negative.    Hematologic/Lymphatic: Does not bruise/bleed easily.   Skin: Negative for poor wound healing.   Musculoskeletal: Negative.    Gastrointestinal: Negative for abdominal pain, heartburn, hematemesis,  "melena, nausea and vomiting.   Genitourinary: Negative.  Negative for hematuria.   Neurological: Positive for dizziness and light-headedness.   Psychiatric/Behavioral: The patient is nervous/anxious.    Allergic/Immunologic: Negative.        Objective:     Vitals:    12/05/19 1309 12/05/19 1311 12/05/19 1313   BP: 108/47 114/79 (!) 88/50   BP Location: Left arm Right arm Right arm   Patient Position: Sitting Sitting Standing   Pulse: 67  77   Resp: 16     Temp: 97.6 °F (36.4 °C)     TempSrc: Temporal     Weight: 102 kg (224 lb)     Height: 167.6 cm (65.98\")         Body mass index is 36.17 kg/m².    Physical Exam   Constitutional: She is oriented to person, place, and time. She appears well-developed and well-nourished. No distress.   HENT:   Head: Normocephalic.   Eyes: Conjunctivae are normal. Pupils are equal, round, and reactive to light.   Neck: Neck supple. No JVD present. No thyromegaly present.   Cardiovascular: Normal rate, regular rhythm, normal heart sounds and intact distal pulses. Exam reveals no gallop and no friction rub.   No murmur heard.  Pulmonary/Chest: Effort normal and breath sounds normal. No respiratory distress. She has no wheezes. She has no rales. She exhibits no tenderness.   Abdominal: Soft. Bowel sounds are normal.   Musculoskeletal: Normal range of motion. She exhibits no edema.   Neurological: She is alert and oriented to person, place, and time.   Skin: Skin is warm and dry.   Psychiatric: She has a normal mood and affect. Her behavior is normal. Thought content normal.   Vitals reviewed.      Lab and Diagnostic Review:  Echo 11/25/19  · Estimated EF = 20%.  · The findings are consistent with stress-induced (Takotsubo) cardiomyopathy.  · Mild mitral valve regurgitation is present  Lab Results   Component Value Date    GLUCOSE 189 (H) 11/26/2019    CALCIUM 9.9 11/26/2019     11/26/2019    K 4.3 11/26/2019    CO2 23.0 11/26/2019    CL 98 11/26/2019    BUN 13 11/26/2019    " CREATININE 0.86 11/26/2019    EGFRIFNONA 65 11/26/2019    BCR 15.1 11/26/2019    ANIONGAP 15.0 11/26/2019       Assessment and Plan:   1. Chronic systolic (congestive) heart failure (CMS/HCC)/Takotsubo cardiomyopathy  Appears euvolemic  Currently has symptomatic hypotension so will cut losartan in half.  Blood pressure could also be low from recent Lasix use but according to her records her systolic blood pressure was in the 80s when she got home and this was without Lasix.  Continue Lasix PRN 3 pound weight gain in 24 hours or 5 pounds in a week or worsening shortness of breath  Low-sodium diet and daily weights  - Ambulatory Referral to Cardiac Rehab      3. Essential hypertension  Borderline hypotensive.  Decrease losartan to 50 mg daily    4. Anxiety  Recently started on BuSpar.  I have advised her to reconsider counseling.  She is currently looking into counselors that will take her insurance.  To need to follow-up closely with her PCP      Patient is doing very well and follows up with Dr. Iverson closely in [place]  She will follow-up with [facility] as needed    It has been a pleasure to participate in the care of this patient.  Patient was instructed to call the [facility] with any questions, concerns, or worsening symptoms.    *Please note that portions of this note were completed with a voice recognition program. Efforts were made to edit the dictations, but occasionally words are mistranscribed.

## 2019-12-05 ENCOUNTER — OFFICE VISIT (OUTPATIENT)
Dept: CARDIOLOGY | Facility: HOSPITAL | Age: 69
End: 2019-12-05

## 2019-12-05 VITALS
DIASTOLIC BLOOD PRESSURE: 50 MMHG | HEIGHT: 66 IN | WEIGHT: 224 LBS | SYSTOLIC BLOOD PRESSURE: 88 MMHG | BODY MASS INDEX: 36 KG/M2 | TEMPERATURE: 97.6 F | HEART RATE: 77 BPM | RESPIRATION RATE: 16 BRPM

## 2019-12-05 DIAGNOSIS — F41.9 ANXIETY: ICD-10-CM

## 2019-12-05 DIAGNOSIS — I10 ESSENTIAL HYPERTENSION: ICD-10-CM

## 2019-12-05 DIAGNOSIS — I51.81 TAKOTSUBO CARDIOMYOPATHY: ICD-10-CM

## 2019-12-05 DIAGNOSIS — I50.22 CHRONIC SYSTOLIC (CONGESTIVE) HEART FAILURE (HCC): Primary | ICD-10-CM

## 2019-12-05 PROCEDURE — 99214 OFFICE O/P EST MOD 30 MIN: CPT | Performed by: NURSE PRACTITIONER

## 2019-12-05 RX ORDER — ACETAMINOPHEN 160 MG
2000 TABLET,DISINTEGRATING ORAL DAILY
COMMUNITY

## 2019-12-05 RX ORDER — LOSARTAN POTASSIUM 100 MG/1
50 TABLET ORAL NIGHTLY
Qty: 30 TABLET | Refills: 0
Start: 2019-12-05 | End: 2019-12-05 | Stop reason: SDUPTHER

## 2019-12-05 RX ORDER — LOSARTAN POTASSIUM 50 MG/1
50 TABLET ORAL NIGHTLY
Qty: 30 TABLET | Refills: 3 | Status: SHIPPED | OUTPATIENT
Start: 2019-12-05 | End: 2020-01-08 | Stop reason: SDUPTHER

## 2019-12-05 RX ORDER — BUSPIRONE HYDROCHLORIDE 5 MG/1
2.5 TABLET ORAL DAILY
COMMUNITY

## 2019-12-06 ENCOUNTER — TELEPHONE (OUTPATIENT)
Dept: CARDIOLOGY | Facility: HOSPITAL | Age: 69
End: 2019-12-06

## 2019-12-06 NOTE — TELEPHONE ENCOUNTER
I didn't. I just took it off her list because I didn't see it in her bag when I reviewed her meds. Please tell her that she can continue taking

## 2019-12-09 ENCOUNTER — DOCUMENTATION (OUTPATIENT)
Dept: CARDIAC REHAB | Facility: HOSPITAL | Age: 69
End: 2019-12-09

## 2019-12-10 ENCOUNTER — DOCUMENTATION (OUTPATIENT)
Dept: CARDIAC REHAB | Facility: HOSPITAL | Age: 69
End: 2019-12-10

## 2019-12-23 ENCOUNTER — TELEPHONE (OUTPATIENT)
Dept: CARDIOLOGY | Facility: HOSPITAL | Age: 69
End: 2019-12-23

## 2019-12-23 NOTE — TELEPHONE ENCOUNTER
Patient called inquiring about cardiac rehab. I didn't see any notes from here that anyone called her from here about it. Called patient and left voicemail to call back.

## 2019-12-31 NOTE — TELEPHONE ENCOUNTER
Patient called and I returned the phone call left message for her to call back referencing contacting Cardiac Rehab referral. Patient needs to contact Cardiac Rehab to get it referred to [facility].

## 2020-01-02 ENCOUNTER — DOCUMENTATION (OUTPATIENT)
Dept: CARDIAC REHAB | Facility: HOSPITAL | Age: 70
End: 2020-01-02

## 2020-01-06 NOTE — PROGRESS NOTES
Subjective:     Encounter Date:01/07/2020    Primary Care Physician: Katya Howard MD      Patient ID: Micaela Mc is a 69 y.o. female.    Chief Complaint:Orthostatic hypotension    PROBLEM LIST:  1. Takotsubo cardiomyopathy  1. February 18, 2019 cardiac catheterization normal coronaries.  EF 20-25%.  LV gram consistent with Takot subo.  2. Echocardiogram February 20, 2019 EF of 50%.  [place] hypokinetic.  3. 9/18/2019 echo EF 50%.  Mild mitral regurgitation.  4. 11/24/19 echo EF 20% c/w Takotsubo cardiomyopathy. Mild MR.  2. Hypertension.  3. Dyslipidemia.  4. Murmur.  5. Type 2 diabetes mellitus.  6. GERD.  7. Peptic ulcer disease.  8. Depression.  9. Obstructive sleep apnea, intolerant to CPAP.  10. Iron deficiency.  11. Tonsillectomy.  12. Partial hysterectomy.  13.  Appendectomy.  14. Right ankle fracture.    15. Remote LAP-BAND surgery        Allergies   Allergen Reactions   • Codeine Itching   • Rocephin [Ceftriaxone] Itching     Has tolerated amoxicillin in past         Current Outpatient Medications:   •  atorvastatin (LIPITOR) 80 MG tablet, Take 1 tablet by mouth Every Night., Disp: 30 tablet, Rfl: 1  •  busPIRone (BUSPAR) 5 MG tablet, Take 2.5 mg by mouth Daily., Disp: , Rfl:   •  carvedilol (COREG) 25 MG tablet, Take 1 tablet by mouth 2 (Two) Times a Day With Meals., Disp: 60 tablet, Rfl: 11  •  Cholecalciferol (VITAMIN D3) 50 MCG (2000 UT) capsule, Take 2,000 Units by mouth Daily., Disp: , Rfl:   •  ferrous sulfate 324 (65 FE) MG tablet delayed-release EC tablet, Take 324 mg by mouth Daily With Breakfast., Disp: , Rfl:   •  FLUoxetine (PROzac) 20 MG capsule, Take 40 mg by mouth Daily., Disp: , Rfl:   •  furosemide (LASIX) 40 MG tablet, Take 1 tablet by mouth Daily As Needed (edema)., Disp: 30 tablet, Rfl: 1  •  glimepiride (AMARYL) 1 MG tablet, Take 1 mg by mouth Every Morning Before Breakfast., Disp: , Rfl:   •  hyoscyamine (LEVSIN) 0.125 MG SL tablet, Take 0.125 mg by mouth Every 4 (Four) Hours  As Needed for Cramping., Disp: , Rfl:   •  lansoprazole (PREVACID) 30 MG capsule, Take 30 mg by mouth Daily., Disp: , Rfl:   •  losartan (COZAAR) 50 MG tablet, Take 1 tablet by mouth Every Night., Disp: 30 tablet, Rfl: 3  •  metFORMIN (GLUCOPHAGE) 500 MG tablet, Take 500 mg by mouth 2 (Two) Times a Day With Meals., Disp: , Rfl:   •  potassium chloride (K-DUR,KLOR-CON) 20 MEQ CR tablet, Take 1 tablet by mouth Daily As Needed (edema)., Disp: 30 tablet, Rfl: 1        History of Present Illness    The for follow-up of heart failure and Takotsubo's cardiomyopathy.  She notes is being discharged home she is doing much better.  Her social issues have been straight.  She brings her blood pressure along with us, the was reviewed, it appeared to be the most part in the 120s 130 systolic range.  Her pulse was in the 55-65 range.  She did have some dizziness earlier which was improved with decreasing her losartan dose.  Exercise, but feels good.    The following portions of the patient's history were reviewed and updated as appropriate: allergies, current medications, past family history, past medical history, past social history, past surgical history and problem list.      Social History     Tobacco Use   • Smoking status: Never Smoker   • Smokeless tobacco: Never Used   Substance Use Topics   • Alcohol use: Yes     Comment: OCCASIONALLY    • Drug use: No         Review of Systems   Constitution: Positive for malaise/fatigue and weight gain.   Cardiovascular: Negative.    Respiratory: Negative.    Hematologic/Lymphatic: Negative for bleeding problem. Does not bruise/bleed easily.   Skin: Positive for dry skin. Negative for rash.   Musculoskeletal: Negative for muscle weakness and myalgias.   Gastrointestinal: Negative for heartburn, nausea and vomiting.   Genitourinary: Positive for frequency.   Neurological: Positive for light-headedness.   Psychiatric/Behavioral: The patient is nervous/anxious.           Objective:     "height is 166.4 cm (65.5\") and weight is 103 kg (227 lb). Her blood pressure is 136/84 and her pulse is 72.         Physical Exam   Constitutional: She is oriented to person, place, and time. She appears well-developed and well-nourished.   HENT:   Mouth/Throat: Oropharynx is clear and moist.   Neck: No JVD present. Carotid bruit is not present. No thyromegaly present.   Cardiovascular: Regular rhythm, S1 normal, S2 normal, normal heart sounds and intact distal pulses. Exam reveals no gallop, no S3 and no S4.   No murmur heard.  Pulses:       Carotid pulses are 2+ on the right side, and 2+ on the left side.       Radial pulses are 2+ on the right side, and 2+ on the left side.   Pulmonary/Chest: Breath sounds normal.   Abdominal: Soft. Bowel sounds are normal. She exhibits no mass. There is no tenderness.   Musculoskeletal: She exhibits no edema.   Neurological: She is alert and oriented to person, place, and time.   Skin: Skin is warm and dry. No rash noted.       Procedures          Assessment:   Assessment/Plan      Micaela was seen today for orthostatic hypotension.    Diagnoses and all orders for this visit:    Essential hypertension    Takotsubo cardiomyopathy    Pure hypercholesterolemia      1.  Recurrent Takotsubo's cardiomyopathy.  Currently functional class I-2.  Improving.  2.  Hypertension controlled  3.  Dyslipidemia controlled    Recommendations  1.  Continue current Entresto and carvedilol dosages.  2.  Initiate exercise/cardiac rehab program.  3.  Revisit 3 months time with an echo     Chase Iverson MD      Dictated utilizing Dragon dictation  "

## 2020-01-07 ENCOUNTER — OFFICE VISIT (OUTPATIENT)
Dept: CARDIOLOGY | Facility: CLINIC | Age: 70
End: 2020-01-07

## 2020-01-07 VITALS
BODY MASS INDEX: 36.48 KG/M2 | DIASTOLIC BLOOD PRESSURE: 84 MMHG | WEIGHT: 227 LBS | HEIGHT: 66 IN | SYSTOLIC BLOOD PRESSURE: 136 MMHG | HEART RATE: 72 BPM

## 2020-01-07 DIAGNOSIS — I10 ESSENTIAL HYPERTENSION: Primary | ICD-10-CM

## 2020-01-07 DIAGNOSIS — I51.81 TAKOTSUBO CARDIOMYOPATHY: ICD-10-CM

## 2020-01-07 DIAGNOSIS — E78.00 PURE HYPERCHOLESTEROLEMIA: ICD-10-CM

## 2020-01-07 DIAGNOSIS — I51.81 TAKOTSUBO CARDIOMYOPATHY: Primary | ICD-10-CM

## 2020-01-07 PROCEDURE — 99213 OFFICE O/P EST LOW 20 MIN: CPT | Performed by: INTERNAL MEDICINE

## 2020-01-08 RX ORDER — LOSARTAN POTASSIUM 50 MG/1
50 TABLET ORAL NIGHTLY
Qty: 30 TABLET | Refills: 11 | Status: SHIPPED | OUTPATIENT
Start: 2020-01-08 | End: 2021-08-03 | Stop reason: SDUPTHER

## 2020-03-30 ENCOUNTER — HOSPITAL ENCOUNTER (EMERGENCY)
Facility: HOSPITAL | Age: 70
Discharge: HOME | End: 2020-03-30
Attending: EMERGENCY MEDICINE | Admitting: EMERGENCY MEDICINE

## 2020-03-30 ENCOUNTER — APPOINTMENT (OUTPATIENT)
Dept: GENERAL RADIOLOGY | Facility: HOSPITAL | Age: 70
End: 2020-03-30

## 2020-03-30 VITALS
DIASTOLIC BLOOD PRESSURE: 85 MMHG | SYSTOLIC BLOOD PRESSURE: 149 MMHG | HEIGHT: 65 IN | BODY MASS INDEX: 37.82 KG/M2 | HEART RATE: 80 BPM | WEIGHT: 227 LBS | TEMPERATURE: 99.7 F | RESPIRATION RATE: 18 BRPM | OXYGEN SATURATION: 94 %

## 2020-03-30 DIAGNOSIS — R07.9 INTERMITTENT CHEST PAIN: Primary | ICD-10-CM

## 2020-03-30 DIAGNOSIS — I51.81 TAKOTSUBO CARDIOMYOPATHY: ICD-10-CM

## 2020-03-30 LAB
ALBUMIN SERPL-MCNC: 4.4 G/DL (ref 3.5–5.2)
ALBUMIN/GLOB SERPL: 1.1 G/DL
ALP SERPL-CCNC: 108 U/L (ref 39–117)
ALT SERPL W P-5'-P-CCNC: 18 U/L (ref 1–33)
ANION GAP SERPL CALCULATED.3IONS-SCNC: 17 MMOL/L (ref 5–15)
AST SERPL-CCNC: 30 U/L (ref 1–32)
BASOPHILS # BLD AUTO: 0.02 10*3/MM3 (ref 0–0.2)
BASOPHILS NFR BLD AUTO: 0.2 % (ref 0–1.5)
BILIRUB SERPL-MCNC: 0.8 MG/DL (ref 0.2–1.2)
BUN BLD-MCNC: 12 MG/DL (ref 8–23)
BUN/CREAT SERPL: 11.3 (ref 7–25)
CALCIUM SPEC-SCNC: 10.2 MG/DL (ref 8.6–10.5)
CHLORIDE SERPL-SCNC: 96 MMOL/L (ref 98–107)
CO2 SERPL-SCNC: 24 MMOL/L (ref 22–29)
CREAT BLD-MCNC: 1.06 MG/DL (ref 0.57–1)
DEPRECATED RDW RBC AUTO: 45.2 FL (ref 37–54)
EOSINOPHIL # BLD AUTO: 0.04 10*3/MM3 (ref 0–0.4)
EOSINOPHIL NFR BLD AUTO: 0.4 % (ref 0.3–6.2)
ERYTHROCYTE [DISTWIDTH] IN BLOOD BY AUTOMATED COUNT: 13.2 % (ref 12.3–15.4)
GFR SERPL CREATININE-BSD FRML MDRD: 51 ML/MIN/1.73
GLOBULIN UR ELPH-MCNC: 4.1 GM/DL
GLUCOSE BLD-MCNC: 181 MG/DL (ref 65–99)
HCT VFR BLD AUTO: 40.9 % (ref 34–46.6)
HGB BLD-MCNC: 13.5 G/DL (ref 12–15.9)
HOLD SPECIMEN: NORMAL
HOLD SPECIMEN: NORMAL
IMM GRANULOCYTES # BLD AUTO: 0.04 10*3/MM3 (ref 0–0.05)
IMM GRANULOCYTES NFR BLD AUTO: 0.4 % (ref 0–0.5)
LIPASE SERPL-CCNC: 14 U/L (ref 13–60)
LYMPHOCYTES # BLD AUTO: 2.05 10*3/MM3 (ref 0.7–3.1)
LYMPHOCYTES NFR BLD AUTO: 18 % (ref 19.6–45.3)
MCH RBC QN AUTO: 30.8 PG (ref 26.6–33)
MCHC RBC AUTO-ENTMCNC: 33 G/DL (ref 31.5–35.7)
MCV RBC AUTO: 93.4 FL (ref 79–97)
MONOCYTES # BLD AUTO: 0.88 10*3/MM3 (ref 0.1–0.9)
MONOCYTES NFR BLD AUTO: 7.7 % (ref 5–12)
NEUTROPHILS # BLD AUTO: 8.37 10*3/MM3 (ref 1.7–7)
NEUTROPHILS NFR BLD AUTO: 73.3 % (ref 42.7–76)
NRBC BLD AUTO-RTO: 0 /100 WBC (ref 0–0.2)
NT-PROBNP SERPL-MCNC: 282.1 PG/ML (ref 5–900)
PLATELET # BLD AUTO: 183 10*3/MM3 (ref 140–450)
PMV BLD AUTO: 11 FL (ref 6–12)
POTASSIUM BLD-SCNC: 4.9 MMOL/L (ref 3.5–5.2)
PROT SERPL-MCNC: 8.5 G/DL (ref 6–8.5)
RBC # BLD AUTO: 4.38 10*6/MM3 (ref 3.77–5.28)
SODIUM BLD-SCNC: 137 MMOL/L (ref 136–145)
TROPONIN T SERPL-MCNC: <0.01 NG/ML (ref 0–0.03)
WBC NRBC COR # BLD: 11.4 10*3/MM3 (ref 3.4–10.8)
WHOLE BLOOD HOLD SPECIMEN: NORMAL
WHOLE BLOOD HOLD SPECIMEN: NORMAL

## 2020-03-30 PROCEDURE — 80053 COMPREHEN METABOLIC PANEL: CPT | Performed by: EMERGENCY MEDICINE

## 2020-03-30 PROCEDURE — 71045 X-RAY EXAM CHEST 1 VIEW: CPT

## 2020-03-30 PROCEDURE — 93005 ELECTROCARDIOGRAM TRACING: CPT | Performed by: EMERGENCY MEDICINE

## 2020-03-30 PROCEDURE — 84484 ASSAY OF TROPONIN QUANT: CPT | Performed by: EMERGENCY MEDICINE

## 2020-03-30 PROCEDURE — 83880 ASSAY OF NATRIURETIC PEPTIDE: CPT | Performed by: EMERGENCY MEDICINE

## 2020-03-30 PROCEDURE — 83690 ASSAY OF LIPASE: CPT | Performed by: EMERGENCY MEDICINE

## 2020-03-30 PROCEDURE — 99284 EMERGENCY DEPT VISIT MOD MDM: CPT

## 2020-03-30 PROCEDURE — 85025 COMPLETE CBC W/AUTO DIFF WBC: CPT | Performed by: EMERGENCY MEDICINE

## 2020-03-30 RX ORDER — ASPIRIN 81 MG/1
324 TABLET, CHEWABLE ORAL ONCE
Status: COMPLETED | OUTPATIENT
Start: 2020-03-30 | End: 2020-03-30

## 2020-03-30 RX ORDER — SODIUM CHLORIDE 0.9 % (FLUSH) 0.9 %
10 SYRINGE (ML) INJECTION AS NEEDED
Status: DISCONTINUED | OUTPATIENT
Start: 2020-03-30 | End: 2020-03-30 | Stop reason: HOSPADM

## 2020-03-30 RX ORDER — NITROGLYCERIN 0.4 MG/1
0.4 TABLET SUBLINGUAL
Qty: 30 TABLET | Refills: 0 | Status: SHIPPED | OUTPATIENT
Start: 2020-03-30 | End: 2022-08-10 | Stop reason: SDUPTHER

## 2020-03-30 RX ADMIN — NITROGLYCERIN 1 INCH: 20 OINTMENT TOPICAL at 17:14

## 2020-03-30 RX ADMIN — ASPIRIN 81 MG 324 MG: 81 TABLET ORAL at 16:59

## 2020-04-01 ENCOUNTER — APPOINTMENT (OUTPATIENT)
Dept: CARDIOLOGY | Facility: HOSPITAL | Age: 70
End: 2020-04-01

## 2020-05-08 ENCOUNTER — TRANSCRIBE ORDERS (OUTPATIENT)
Dept: ADMINISTRATIVE | Facility: HOSPITAL | Age: 70
End: 2020-05-08

## 2020-05-08 DIAGNOSIS — Z12.31 VISIT FOR SCREENING MAMMOGRAM: Primary | ICD-10-CM

## 2020-06-17 ENCOUNTER — APPOINTMENT (OUTPATIENT)
Dept: CARDIOLOGY | Facility: HOSPITAL | Age: 70
End: 2020-06-17

## 2020-06-19 DIAGNOSIS — Z12.11 SCREENING FOR COLON CANCER: Primary | ICD-10-CM

## 2020-06-19 RX ORDER — SODIUM, POTASSIUM,MAG SULFATES 17.5-3.13G
SOLUTION, RECONSTITUTED, ORAL ORAL TAKE AS DIRECTED
Qty: 354 ML | Refills: 0 | Status: SHIPPED | OUTPATIENT
Start: 2020-06-19 | End: 2020-06-29 | Stop reason: ALTCHOICE

## 2020-06-29 ENCOUNTER — APPOINTMENT (OUTPATIENT)
Dept: PREADMISSION TESTING | Facility: HOSPITAL | Age: 70
End: 2020-06-29

## 2020-06-29 DIAGNOSIS — Z12.11 SCREENING FOR COLON CANCER: Primary | ICD-10-CM

## 2020-06-29 PROCEDURE — U0004 COV-19 TEST NON-CDC HGH THRU: HCPCS

## 2020-06-29 PROCEDURE — U0002 COVID-19 LAB TEST NON-CDC: HCPCS

## 2020-06-29 PROCEDURE — C9803 HOPD COVID-19 SPEC COLLECT: HCPCS

## 2020-06-30 LAB
REF LAB TEST METHOD: NORMAL
SARS-COV-2 RNA RESP QL NAA+PROBE: NOT DETECTED

## 2020-07-02 ENCOUNTER — OUTSIDE FACILITY SERVICE (OUTPATIENT)
Dept: GASTROENTEROLOGY | Facility: CLINIC | Age: 70
End: 2020-07-02

## 2020-07-02 ENCOUNTER — LAB REQUISITION (OUTPATIENT)
Dept: LAB | Facility: HOSPITAL | Age: 70
End: 2020-07-02

## 2020-07-02 DIAGNOSIS — Z86.010 PERSONAL HISTORY OF COLONIC POLYPS: ICD-10-CM

## 2020-07-02 DIAGNOSIS — Z12.11 ENCOUNTER FOR SCREENING FOR MALIGNANT NEOPLASM OF COLON: ICD-10-CM

## 2020-07-02 PROCEDURE — 88305 TISSUE EXAM BY PATHOLOGIST: CPT | Performed by: INTERNAL MEDICINE

## 2020-07-02 PROCEDURE — 45388 COLONOSCOPY W/ABLATION: CPT | Performed by: INTERNAL MEDICINE

## 2020-07-02 PROCEDURE — 45385 COLONOSCOPY W/LESION REMOVAL: CPT | Performed by: INTERNAL MEDICINE

## 2020-07-06 LAB
CYTO UR: NORMAL
LAB AP CASE REPORT: NORMAL
LAB AP CLINICAL INFORMATION: NORMAL
PATH REPORT.FINAL DX SPEC: NORMAL
PATH REPORT.GROSS SPEC: NORMAL

## 2020-07-13 ENCOUNTER — APPOINTMENT (OUTPATIENT)
Dept: MAMMOGRAPHY | Facility: HOSPITAL | Age: 70
End: 2020-07-13

## 2020-07-20 ENCOUNTER — HOSPITAL ENCOUNTER (OUTPATIENT)
Dept: CARDIOLOGY | Facility: HOSPITAL | Age: 70
Discharge: HOME | End: 2020-07-20
Admitting: INTERNAL MEDICINE

## 2020-07-20 VITALS — WEIGHT: 227 LBS | BODY MASS INDEX: 37.82 KG/M2 | HEIGHT: 65 IN

## 2020-07-20 DIAGNOSIS — I51.81 TAKOTSUBO CARDIOMYOPATHY: ICD-10-CM

## 2020-07-20 PROCEDURE — 93306 TTE W/DOPPLER COMPLETE: CPT | Performed by: INTERNAL MEDICINE

## 2020-07-20 PROCEDURE — 93306 TTE W/DOPPLER COMPLETE: CPT

## 2020-07-21 LAB
BH CV ECHO MEAS - AO MAX PG (FULL): 13.9 MMHG
BH CV ECHO MEAS - AO MAX PG: 18 MMHG
BH CV ECHO MEAS - AO MEAN PG (FULL): 9 MMHG
BH CV ECHO MEAS - AO MEAN PG: 11 MMHG
BH CV ECHO MEAS - AO ROOT AREA (BSA CORRECTED): 1.2
BH CV ECHO MEAS - AO ROOT AREA: 4.9 CM^2
BH CV ECHO MEAS - AO ROOT DIAM: 2.5 CM
BH CV ECHO MEAS - AO V2 MAX: 212 CM/SEC
BH CV ECHO MEAS - AO V2 MEAN: 154 CM/SEC
BH CV ECHO MEAS - AO V2 VTI: 49.3 CM
BH CV ECHO MEAS - AVA(I,A): 1.5 CM^2
BH CV ECHO MEAS - AVA(I,D): 1.5 CM^2
BH CV ECHO MEAS - AVA(V,A): 1.7 CM^2
BH CV ECHO MEAS - AVA(V,D): 1.7 CM^2
BH CV ECHO MEAS - BSA(HAYCOCK): 2.2 M^2
BH CV ECHO MEAS - BSA: 2.1 M^2
BH CV ECHO MEAS - BZI_BMI: 37.8 KILOGRAMS/M^2
BH CV ECHO MEAS - BZI_METRIC_HEIGHT: 165.1 CM
BH CV ECHO MEAS - BZI_METRIC_WEIGHT: 103 KG
BH CV ECHO MEAS - EDV(CUBED): 119.8 ML
BH CV ECHO MEAS - EDV(MOD-SP2): 67 ML
BH CV ECHO MEAS - EDV(MOD-SP4): 99 ML
BH CV ECHO MEAS - EDV(TEICH): 114.4 ML
BH CV ECHO MEAS - EF(CUBED): 82.5 %
BH CV ECHO MEAS - EF(MOD-SP2): 59.7 %
BH CV ECHO MEAS - EF(MOD-SP4): 56.6 %
BH CV ECHO MEAS - EF(TEICH): 75.1 %
BH CV ECHO MEAS - ESV(CUBED): 21 ML
BH CV ECHO MEAS - ESV(MOD-SP2): 27 ML
BH CV ECHO MEAS - ESV(MOD-SP4): 43 ML
BH CV ECHO MEAS - ESV(TEICH): 28.5 ML
BH CV ECHO MEAS - FS: 44 %
BH CV ECHO MEAS - IVS/LVPW: 1
BH CV ECHO MEAS - IVSD: 0.94 CM
BH CV ECHO MEAS - LA DIMENSION: 4.3 CM
BH CV ECHO MEAS - LA/AO: 1.7
BH CV ECHO MEAS - LAD MAJOR: 5.3 CM
BH CV ECHO MEAS - LAT PEAK E' VEL: 5.6 CM/SEC
BH CV ECHO MEAS - LATERAL E/E' RATIO: 14.5
BH CV ECHO MEAS - LV DIASTOLIC VOL/BSA (35-75): 47.4 ML/M^2
BH CV ECHO MEAS - LV MASS(C)D: 161.1 GRAMS
BH CV ECHO MEAS - LV MASS(C)DI: 77.2 GRAMS/M^2
BH CV ECHO MEAS - LV MAX PG: 4.1 MMHG
BH CV ECHO MEAS - LV MEAN PG: 2 MMHG
BH CV ECHO MEAS - LV SYSTOLIC VOL/BSA (12-30): 20.6 ML/M^2
BH CV ECHO MEAS - LV V1 MAX: 101 CM/SEC
BH CV ECHO MEAS - LV V1 MEAN: 71 CM/SEC
BH CV ECHO MEAS - LV V1 VTI: 21.1 CM
BH CV ECHO MEAS - LVIDD: 4.9 CM
BH CV ECHO MEAS - LVIDS: 2.8 CM
BH CV ECHO MEAS - LVLD AP2: 6.9 CM
BH CV ECHO MEAS - LVLD AP4: 7.6 CM
BH CV ECHO MEAS - LVLS AP2: 5.8 CM
BH CV ECHO MEAS - LVLS AP4: 6.1 CM
BH CV ECHO MEAS - LVOT AREA (M): 3.5 CM^2
BH CV ECHO MEAS - LVOT AREA: 3.5 CM^2
BH CV ECHO MEAS - LVOT DIAM: 2.1 CM
BH CV ECHO MEAS - LVPWD: 0.91 CM
BH CV ECHO MEAS - MED PEAK E' VEL: 5.7 CM/SEC
BH CV ECHO MEAS - MEDIAL E/E' RATIO: 14.3
BH CV ECHO MEAS - MV A MAX VEL: 92.8 CM/SEC
BH CV ECHO MEAS - MV DEC TIME: 0.24 SEC
BH CV ECHO MEAS - MV E MAX VEL: 81.4 CM/SEC
BH CV ECHO MEAS - MV E/A: 0.88
BH CV ECHO MEAS - PA ACC SLOPE: 587 CM/SEC^2
BH CV ECHO MEAS - PA ACC TIME: 0.13 SEC
BH CV ECHO MEAS - PA MAX PG: 5.7 MMHG
BH CV ECHO MEAS - PA PR(ACCEL): 20.5 MMHG
BH CV ECHO MEAS - PA V2 MAX: 119 CM/SEC
BH CV ECHO MEAS - SI(AO): 115.9 ML/M^2
BH CV ECHO MEAS - SI(CUBED): 47.3 ML/M^2
BH CV ECHO MEAS - SI(LVOT): 35 ML/M^2
BH CV ECHO MEAS - SI(MOD-SP2): 19.2 ML/M^2
BH CV ECHO MEAS - SI(MOD-SP4): 26.8 ML/M^2
BH CV ECHO MEAS - SI(TEICH): 41.2 ML/M^2
BH CV ECHO MEAS - SV(AO): 242 ML
BH CV ECHO MEAS - SV(CUBED): 98.8 ML
BH CV ECHO MEAS - SV(LVOT): 73.1 ML
BH CV ECHO MEAS - SV(MOD-SP2): 40 ML
BH CV ECHO MEAS - SV(MOD-SP4): 56 ML
BH CV ECHO MEAS - SV(TEICH): 85.9 ML
BH CV ECHO MEAS - TAPSE (>1.6): 2.3 CM2
BH CV ECHO MEASUREMENTS AVERAGE E/E' RATIO: 14.41
BH CV XLRA - RV BASE: 3.2 CM
BH CV XLRA - RV LENGTH: 7 CM
BH CV XLRA - RV MID: 2.9 CM
BH CV XLRA - TDI S': 14.5 CM/SEC
LEFT ATRIUM VOLUME INDEX: 22 ML/M^2
LEFT ATRIUM VOLUME: 46 ML
LV EF 2D ECHO EST: 60 %

## 2020-07-29 ENCOUNTER — OFFICE VISIT (OUTPATIENT)
Dept: CARDIOLOGY | Facility: CLINIC | Age: 70
End: 2020-07-29

## 2020-07-29 VITALS
SYSTOLIC BLOOD PRESSURE: 110 MMHG | BODY MASS INDEX: 35.84 KG/M2 | DIASTOLIC BLOOD PRESSURE: 70 MMHG | WEIGHT: 223 LBS | HEART RATE: 75 BPM | HEIGHT: 66 IN

## 2020-07-29 DIAGNOSIS — I10 ESSENTIAL HYPERTENSION: ICD-10-CM

## 2020-07-29 DIAGNOSIS — I51.81 TAKOTSUBO CARDIOMYOPATHY: Primary | ICD-10-CM

## 2020-07-29 PROCEDURE — 99212 OFFICE O/P EST SF 10 MIN: CPT | Performed by: NURSE PRACTITIONER

## 2020-07-29 NOTE — PROGRESS NOTES
Subjective:     Encounter Date:07/29/2020    Primary Care Physician: Katya Howard MD      Patient ID: Micaela Mc is a 70 y.o. female.    Chief Complaint:Follow-up    PROBLEM LIST:  1. Takotsubo cardiomyopathy  1. February 18, 2019 cardiac catheterization normal coronaries.  EF 20-25%.  LV gram consistent with Takot subo.  2. Echocardiogram February 20, 2019 EF of 50%.  [place] hypokinetic.  3. 9/18/2019 echo EF 50%.  Mild mitral regurgitation.  4. 11/24/19 echo EF 20% c/w Takotsubo cardiomyopathy. Mild MR.  5. 7/21/2020 echo EF of 60%.  No significant valvular disease.  2. Hypertension.  3. Dyslipidemia.  4. Murmur.  5. Type 2 diabetes mellitus.  6. GERD.  7. Peptic ulcer disease.  8. Depression.  9. Obstructive sleep apnea, intolerant to CPAP.  10. Iron deficiency.  11. Tonsillectomy.  12. Partial hysterectomy.  13.  Appendectomy.  14. Right ankle fracture.    15. Remote LAP-BAND surgery        Allergies   Allergen Reactions   • Codeine Itching   • Rocephin [Ceftriaxone] Itching     Has tolerated amoxicillin in past         Current Outpatient Medications:   •  atorvastatin (LIPITOR) 80 MG tablet, Take 1 tablet by mouth Every Night., Disp: 30 tablet, Rfl: 1  •  busPIRone (BUSPAR) 5 MG tablet, Take 2.5 mg by mouth Daily., Disp: , Rfl:   •  carvedilol (COREG) 25 MG tablet, Take 1 tablet by mouth 2 (Two) Times a Day With Meals. (Patient taking differently: Take 25 mg by mouth. 1/2 tablet twice a day), Disp: 60 tablet, Rfl: 11  •  Cholecalciferol (VITAMIN D3) 50 MCG (2000 UT) capsule, Take 2,000 Units by mouth Daily., Disp: , Rfl:   •  ferrous sulfate 324 (65 FE) MG tablet delayed-release EC tablet, Take 324 mg by mouth Daily With Breakfast., Disp: , Rfl:   •  FLUoxetine (PROzac) 20 MG capsule, Take 40 mg by mouth Daily., Disp: , Rfl:   •  furosemide (LASIX) 40 MG tablet, Take 1 tablet by mouth Daily As Needed (edema)., Disp: 30 tablet, Rfl: 1  •  glimepiride (AMARYL) 1 MG tablet, Take 1 mg by mouth Every  Morning Before Breakfast. 1.5 daily, Disp: , Rfl:   •  hyoscyamine (LEVSIN) 0.125 MG SL tablet, Take 0.125 mg by mouth Every 4 (Four) Hours As Needed for Cramping., Disp: , Rfl:   •  lansoprazole (PREVACID) 30 MG capsule, Take 30 mg by mouth Daily., Disp: , Rfl:   •  losartan (COZAAR) 50 MG tablet, Take 1 tablet by mouth Every Night., Disp: 30 tablet, Rfl: 11  •  metFORMIN (GLUCOPHAGE) 500 MG tablet, Take 500 mg by mouth 2 (Two) Times a Day With Meals., Disp: , Rfl:   •  nitroglycerin (NITROSTAT) 0.4 MG SL tablet, Place 1 tablet under the tongue Every 5 (Five) Minutes As Needed for Chest Pain. Take no more than 3 doses in 15 minutes., Disp: 30 tablet, Rfl: 0  •  potassium chloride (K-DUR,KLOR-CON) 20 MEQ CR tablet, Take 1 tablet by mouth Daily As Needed (edema)., Disp: 30 tablet, Rfl: 1  •  Sod Picosulfate-Mag Ox-Cit Acd 10-3.5-12 MG-GM -GM/160ML solution, Take 1 kit by mouth Take As Directed. Follow instructions that were mailed to your home. If you didn't receive these call [phone]., Disp: 2 bottle, Rfl: 0        History of Present Illness    Patient is a 70-year-old  female who is following up today for Takot subo cardiomyopathy.  Since last being seen she notes to overall be doing well.  She notes to be managing her stress well.  She finished the cardiac rehab program.  She notes that she did well with this and felt well upon completion.  However, she has not been getting any routine exercise with the COVID 19 pandemic.  Overall feels that she is doing well from a cardiac standpoint.  No chest pain, pressure, tightness.  No increasing shortness of breath.  No syncope, near-syncope, or edema.  Notes she is compliant with her medications.  Recent echocardiogram showed return of LV EF to normal.    The following portions of the patient's history were reviewed and updated as appropriate: allergies, current medications, past family history, past medical history, past social history, past surgical  "history and problem list.      Social History     Tobacco Use   • Smoking status: Never Smoker   • Smokeless tobacco: Never Used   Substance Use Topics   • Alcohol use: Not Currently     Comment: very rarely   • Drug use: No         Review of Systems   Constitution: Negative.   Cardiovascular: Negative for chest pain, dyspnea on exertion, leg swelling, palpitations and syncope.   Respiratory: Negative.  Negative for shortness of breath.    Hematologic/Lymphatic: Negative for bleeding problem. Does not bruise/bleed easily.   Skin: Negative for rash.   Musculoskeletal: Positive for back pain and myalgias. Negative for muscle weakness.   Gastrointestinal: Negative for heartburn, nausea and vomiting.   Genitourinary: Positive for frequency.   Neurological: Positive for light-headedness and loss of balance. Negative for dizziness and numbness.   Psychiatric/Behavioral: The patient is nervous/anxious.           Objective:   /70 (BP Location: Right arm)   Pulse 75   Ht 167.6 cm (66\")   Wt 101 kg (223 lb)   LMP  (LMP Unknown)   BMI 35.99 kg/m²         Physical Exam   Constitutional: She is oriented to person, place, and time. She appears well-developed and well-nourished. No distress.   Neck: No JVD present. No tracheal deviation present.   Cardiovascular: Normal rate, regular rhythm and normal heart sounds. Exam reveals no friction rub.   No murmur heard.  Pulmonary/Chest: Effort normal and breath sounds normal. No respiratory distress.   Abdominal: Soft. Bowel sounds are normal. There is no tenderness.   Musculoskeletal: She exhibits no edema or deformity.   Neurological: She is alert and oriented to person, place, and time.   Skin: Skin is warm and dry.       Procedures          Assessment:   Assessment/Plan      Micaela was seen today for follow-up.    Diagnoses and all orders for this visit:    Takotsubo cardiomyopathy, LVEF now back to normal by recent echocardiogram.  On losartan.    Essential hypertension, " controlled on beta-blocker.      Plan:  1. Continue current cardiac medications.  2. Reviewed echocardiogram results with the patient in the office today.  3. Follow-up in 1 year's time or sooner if needed.       Taylor TRUONG     Dictated utilizing Dragon dictation

## 2020-08-05 RX ORDER — CARVEDILOL 12.5 MG/1
12.5 TABLET ORAL 2 TIMES DAILY WITH MEALS
Qty: 90 TABLET | Refills: 3 | Status: SHIPPED | OUTPATIENT
Start: 2020-08-05 | End: 2020-08-05 | Stop reason: SDUPTHER

## 2020-08-05 RX ORDER — CARVEDILOL 12.5 MG/1
12.5 TABLET ORAL 2 TIMES DAILY WITH MEALS
Qty: 90 TABLET | Refills: 3 | Status: SHIPPED | OUTPATIENT
Start: 2020-08-05 | End: 2021-02-25 | Stop reason: SDUPTHER

## 2020-09-14 ENCOUNTER — HOSPITAL ENCOUNTER (OUTPATIENT)
Dept: MAMMOGRAPHY | Facility: HOSPITAL | Age: 70
Discharge: HOME | End: 2020-09-14
Admitting: FAMILY MEDICINE

## 2020-09-14 DIAGNOSIS — Z12.31 VISIT FOR SCREENING MAMMOGRAM: ICD-10-CM

## 2020-09-14 PROCEDURE — 77063 BREAST TOMOSYNTHESIS BI: CPT | Performed by: RADIOLOGY

## 2020-09-14 PROCEDURE — 77067 SCR MAMMO BI INCL CAD: CPT

## 2020-09-14 PROCEDURE — 77063 BREAST TOMOSYNTHESIS BI: CPT

## 2020-09-14 PROCEDURE — 77067 SCR MAMMO BI INCL CAD: CPT | Performed by: RADIOLOGY

## 2020-10-11 ENCOUNTER — HOSPITAL ENCOUNTER (EMERGENCY)
Facility: HOSPITAL | Age: 70
Discharge: HOME | End: 2020-10-11
Attending: EMERGENCY MEDICINE | Admitting: EMERGENCY MEDICINE

## 2020-10-11 ENCOUNTER — APPOINTMENT (OUTPATIENT)
Dept: CT IMAGING | Facility: HOSPITAL | Age: 70
End: 2020-10-11

## 2020-10-11 VITALS
BODY MASS INDEX: 33.75 KG/M2 | TEMPERATURE: 97.8 F | SYSTOLIC BLOOD PRESSURE: 142 MMHG | RESPIRATION RATE: 20 BRPM | DIASTOLIC BLOOD PRESSURE: 77 MMHG | WEIGHT: 210 LBS | OXYGEN SATURATION: 100 % | HEIGHT: 66 IN | HEART RATE: 71 BPM

## 2020-10-11 DIAGNOSIS — R10.30 LOWER ABDOMINAL PAIN: ICD-10-CM

## 2020-10-11 DIAGNOSIS — N32.89 BLADDER SPASM: Primary | ICD-10-CM

## 2020-10-11 LAB
ALBUMIN SERPL-MCNC: 3.5 G/DL (ref 3.5–5.2)
ALBUMIN/GLOB SERPL: 1.1 G/DL
ALP SERPL-CCNC: 80 U/L (ref 39–117)
ALT SERPL W P-5'-P-CCNC: 13 U/L (ref 1–33)
ANION GAP SERPL CALCULATED.3IONS-SCNC: 11 MMOL/L (ref 5–15)
AST SERPL-CCNC: 16 U/L (ref 1–32)
BASOPHILS # BLD AUTO: 0.02 10*3/MM3 (ref 0–0.2)
BASOPHILS NFR BLD AUTO: 0.2 % (ref 0–1.5)
BILIRUB SERPL-MCNC: 0.9 MG/DL (ref 0–1.2)
BILIRUB UR QL STRIP: NEGATIVE
BUN SERPL-MCNC: 26 MG/DL (ref 8–23)
BUN/CREAT SERPL: 21.3 (ref 7–25)
CALCIUM SPEC-SCNC: 9.3 MG/DL (ref 8.6–10.5)
CHLORIDE SERPL-SCNC: 98 MMOL/L (ref 98–107)
CLARITY UR: CLEAR
CO2 SERPL-SCNC: 27 MMOL/L (ref 22–29)
COLOR UR: YELLOW
CREAT SERPL-MCNC: 1.22 MG/DL (ref 0.57–1)
DEPRECATED RDW RBC AUTO: 46.1 FL (ref 37–54)
EOSINOPHIL # BLD AUTO: 0.02 10*3/MM3 (ref 0–0.4)
EOSINOPHIL NFR BLD AUTO: 0.2 % (ref 0.3–6.2)
ERYTHROCYTE [DISTWIDTH] IN BLOOD BY AUTOMATED COUNT: 13.5 % (ref 12.3–15.4)
GFR SERPL CREATININE-BSD FRML MDRD: 43 ML/MIN/1.73
GLOBULIN UR ELPH-MCNC: 3.1 GM/DL
GLUCOSE SERPL-MCNC: 177 MG/DL (ref 65–99)
GLUCOSE UR STRIP-MCNC: NEGATIVE MG/DL
HCT VFR BLD AUTO: 38.6 % (ref 34–46.6)
HGB BLD-MCNC: 12.6 G/DL (ref 12–15.9)
HGB UR QL STRIP.AUTO: NEGATIVE
HOLD SPECIMEN: NORMAL
HOLD SPECIMEN: NORMAL
IMM GRANULOCYTES # BLD AUTO: 0.06 10*3/MM3 (ref 0–0.05)
IMM GRANULOCYTES NFR BLD AUTO: 0.6 % (ref 0–0.5)
KETONES UR QL STRIP: NEGATIVE
LEUKOCYTE ESTERASE UR QL STRIP.AUTO: NEGATIVE
LIPASE SERPL-CCNC: 18 U/L (ref 13–60)
LYMPHOCYTES # BLD AUTO: 2.1 10*3/MM3 (ref 0.7–3.1)
LYMPHOCYTES NFR BLD AUTO: 19.9 % (ref 19.6–45.3)
MCH RBC QN AUTO: 31 PG (ref 26.6–33)
MCHC RBC AUTO-ENTMCNC: 32.6 G/DL (ref 31.5–35.7)
MCV RBC AUTO: 95.1 FL (ref 79–97)
MONOCYTES # BLD AUTO: 0.74 10*3/MM3 (ref 0.1–0.9)
MONOCYTES NFR BLD AUTO: 7 % (ref 5–12)
NEUTROPHILS NFR BLD AUTO: 7.59 10*3/MM3 (ref 1.7–7)
NEUTROPHILS NFR BLD AUTO: 72.1 % (ref 42.7–76)
NITRITE UR QL STRIP: NEGATIVE
NRBC BLD AUTO-RTO: 0 /100 WBC (ref 0–0.2)
PH UR STRIP.AUTO: 5.5 [PH] (ref 5–8)
PLATELET # BLD AUTO: 186 10*3/MM3 (ref 140–450)
PMV BLD AUTO: 11.2 FL (ref 6–12)
POTASSIUM SERPL-SCNC: 3.7 MMOL/L (ref 3.5–5.2)
PROT SERPL-MCNC: 6.6 G/DL (ref 6–8.5)
PROT UR QL STRIP: NEGATIVE
RBC # BLD AUTO: 4.06 10*6/MM3 (ref 3.77–5.28)
SODIUM SERPL-SCNC: 136 MMOL/L (ref 136–145)
SP GR UR STRIP: 1.02 (ref 1–1.03)
UROBILINOGEN UR QL STRIP: NORMAL
WBC # BLD AUTO: 10.53 10*3/MM3 (ref 3.4–10.8)
WHOLE BLOOD HOLD SPECIMEN: NORMAL
WHOLE BLOOD HOLD SPECIMEN: NORMAL

## 2020-10-11 PROCEDURE — 83690 ASSAY OF LIPASE: CPT | Performed by: EMERGENCY MEDICINE

## 2020-10-11 PROCEDURE — 85025 COMPLETE CBC W/AUTO DIFF WBC: CPT | Performed by: EMERGENCY MEDICINE

## 2020-10-11 PROCEDURE — 80053 COMPREHEN METABOLIC PANEL: CPT | Performed by: EMERGENCY MEDICINE

## 2020-10-11 PROCEDURE — 25010000002 IOPAMIDOL 61 % SOLUTION: Performed by: EMERGENCY MEDICINE

## 2020-10-11 PROCEDURE — 81003 URINALYSIS AUTO W/O SCOPE: CPT | Performed by: EMERGENCY MEDICINE

## 2020-10-11 PROCEDURE — 96374 THER/PROPH/DIAG INJ IV PUSH: CPT

## 2020-10-11 PROCEDURE — 74177 CT ABD & PELVIS W/CONTRAST: CPT

## 2020-10-11 PROCEDURE — 25010000002 HYDROMORPHONE PER 4 MG: Performed by: EMERGENCY MEDICINE

## 2020-10-11 PROCEDURE — 99284 EMERGENCY DEPT VISIT MOD MDM: CPT

## 2020-10-11 RX ORDER — HYDROMORPHONE HYDROCHLORIDE 1 MG/ML
0.5 INJECTION, SOLUTION INTRAMUSCULAR; INTRAVENOUS; SUBCUTANEOUS ONCE
Status: COMPLETED | OUTPATIENT
Start: 2020-10-11 | End: 2020-10-11

## 2020-10-11 RX ORDER — CEFUROXIME AXETIL 500 MG/1
500 TABLET ORAL 2 TIMES DAILY
COMMUNITY
End: 2022-08-10

## 2020-10-11 RX ORDER — ONDANSETRON 4 MG/1
4 TABLET, FILM COATED ORAL EVERY 8 HOURS PRN
COMMUNITY

## 2020-10-11 RX ORDER — SODIUM CHLORIDE 0.9 % (FLUSH) 0.9 %
10 SYRINGE (ML) INJECTION AS NEEDED
Status: DISCONTINUED | OUTPATIENT
Start: 2020-10-11 | End: 2020-10-11 | Stop reason: HOSPADM

## 2020-10-11 RX ORDER — DICYCLOMINE HCL 20 MG
20 TABLET ORAL EVERY 6 HOURS PRN
Qty: 20 TABLET | Refills: 0 | Status: SHIPPED | OUTPATIENT
Start: 2020-10-11

## 2020-10-11 RX ADMIN — HYDROMORPHONE HYDROCHLORIDE 0.5 MG: 1 INJECTION, SOLUTION INTRAMUSCULAR; INTRAVENOUS; SUBCUTANEOUS at 11:28

## 2020-10-11 RX ADMIN — IOPAMIDOL 90 ML: 612 INJECTION, SOLUTION INTRAVENOUS at 11:23

## 2020-10-11 NOTE — ED PROVIDER NOTES
Subjective   Pt presents with lower abd pain.  She has had generalized weakness for about 4 days.  Some low back pain as well.  3 days ago she saw PCP and was diagnosed with UTI.  Was given Ceftin and Zofran.  She had a covid test that was negative.  Last night the lower abd pain developed, suprapubic region.  It eased up but is still present today so she came in.  No fever, vomiting, diarrhea.      History provided by:  Patient      Review of Systems   Constitutional: Negative for fever.   Respiratory: Negative for shortness of breath.    Cardiovascular: Negative for chest pain.   Gastrointestinal: Positive for abdominal pain. Negative for vomiting.   Musculoskeletal: Positive for back pain.   Neurological: Positive for weakness.   All other systems reviewed and are negative.      Past Medical History:   Diagnosis Date   • Anemia    • CHF (congestive heart failure) (CMS/HCC)    • Coronary artery disease    • Diabetes mellitus (CMS/HCC)    • GERD (gastroesophageal reflux disease)    • Hyperlipidemia    • Hypertension        Allergies   Allergen Reactions   • Codeine Itching   • Rocephin [Ceftriaxone] Itching     Has tolerated amoxicillin in past       Past Surgical History:   Procedure Laterality Date   • APPENDECTOMY     • BREAST CYST ASPIRATION  1999    pt doesn't remember which breast   • CARDIAC CATHETERIZATION N/A 2/18/2019    Procedure: LEFT HEART CATH;  Surgeon: Martir Cutler MD;  Location:  [facility] CATH INVASIVE LOCATION;  Service: Cardiovascular   • ENDOSCOPY N/A 5/15/2017    Procedure: ESOPHAGOGASTRODUODENOSCOPY;  Surgeon: Jennifer Ingram MD;  Location:  [facility];  Service:    • HYSTERECTOMY      age 41   • LAPAROSCOPIC GASTRIC BANDING         Family History   Problem Relation Age of Onset   • Breast cancer Neg Hx    • Colon cancer Neg Hx    • Endometrial cancer Neg Hx    • Ovarian cancer Neg Hx        Social History     Socioeconomic History   • Marital status:      Spouse name: Not on file    • Number of children: Not on file   • Years of education: Not on file   • Highest education level: Not on file   Tobacco Use   • Smoking status: Never Smoker   • Smokeless tobacco: Never Used   Substance and Sexual Activity   • Alcohol use: Not Currently     Comment: very rarely   • Drug use: No   • Sexual activity: Defer           Objective   Physical Exam  Vitals signs and nursing note reviewed.   Constitutional:       General: She is not in acute distress.     Appearance: Normal appearance. She is not ill-appearing.   HENT:      Head: Normocephalic and atraumatic.      Mouth/Throat:      Mouth: Mucous membranes are moist.   Eyes:      General: No scleral icterus.        Right eye: No discharge.         Left eye: No discharge.      Conjunctiva/sclera: Conjunctivae normal.   Neck:      Musculoskeletal: Normal range of motion and neck supple.   Cardiovascular:      Rate and Rhythm: Normal rate and regular rhythm.      Heart sounds: No murmur.   Pulmonary:      Effort: Pulmonary effort is normal. No respiratory distress.      Breath sounds: Normal breath sounds. No wheezing.   Abdominal:      General: Bowel sounds are normal. There is no distension.      Palpations: Abdomen is soft.      Tenderness: There is no abdominal tenderness. There is no guarding or rebound.   Musculoskeletal: Normal range of motion.         General: No swelling.   Skin:     General: Skin is warm and dry.      Findings: No rash.   Neurological:      General: No focal deficit present.      Mental Status: She is alert. Mental status is at baseline.   Psychiatric:         Mood and Affect: Mood normal.         Behavior: Behavior normal.         Thought Content: Thought content normal.         Procedures           ED Course         UA negative, suspect Ceftin is working.  Labs benign.  CT scan shows some gallbladder changes but report is that findings are unchanged from Feb 2019. Pt was admitted at that time with Takotsubo cardiomyopathy, had CT  showing stone at neck of GB and a little inflammation, same as today.  She has not had upper abd pain or vomiting or postprandial pain, is not febrile.  She has no tenderness.  I do not think this is acute cholecystitis.  Suspect she is having bladder spasms s/t UTI.  Patient stable on serial rechecks.  Discussed findings including GB issue, concerns, plan of care, expected course, reasons to return and followup.  Provided the opportunity to ask questions.                                    MDM  Number of Diagnoses or Management Options  Bladder spasm:   Lower abdominal pain:      Amount and/or Complexity of Data Reviewed  Clinical lab tests: reviewed and ordered  Tests in the radiology section of CPT®: reviewed and ordered  Decide to obtain previous medical records or to obtain history from someone other than the patient: yes  Review and summarize past medical records: yes  Independent visualization of images, tracings, or specimens: yes        Final diagnoses:   Bladder spasm   Lower abdominal pain            Abhijeet Zamudio MD  10/11/20 8702

## 2021-02-25 RX ORDER — CARVEDILOL 12.5 MG/1
12.5 TABLET ORAL 2 TIMES DAILY WITH MEALS
Qty: 90 TABLET | Refills: 3 | Status: SHIPPED | OUTPATIENT
Start: 2021-02-25 | End: 2021-04-30 | Stop reason: SDUPTHER

## 2021-04-30 RX ORDER — CARVEDILOL 12.5 MG/1
12.5 TABLET ORAL 2 TIMES DAILY WITH MEALS
Qty: 180 TABLET | Refills: 3 | Status: SHIPPED | OUTPATIENT
Start: 2021-04-30 | End: 2021-08-03 | Stop reason: SDUPTHER

## 2021-08-03 ENCOUNTER — OFFICE VISIT (OUTPATIENT)
Dept: CARDIOLOGY | Facility: CLINIC | Age: 71
End: 2021-08-03

## 2021-08-03 VITALS
BODY MASS INDEX: 34.79 KG/M2 | OXYGEN SATURATION: 97 % | WEIGHT: 216.5 LBS | SYSTOLIC BLOOD PRESSURE: 120 MMHG | HEIGHT: 66 IN | DIASTOLIC BLOOD PRESSURE: 74 MMHG | HEART RATE: 63 BPM

## 2021-08-03 DIAGNOSIS — I51.81 TAKOTSUBO CARDIOMYOPATHY: ICD-10-CM

## 2021-08-03 DIAGNOSIS — E78.00 PURE HYPERCHOLESTEROLEMIA: ICD-10-CM

## 2021-08-03 DIAGNOSIS — I10 ESSENTIAL HYPERTENSION: Primary | ICD-10-CM

## 2021-08-03 PROCEDURE — 99213 OFFICE O/P EST LOW 20 MIN: CPT | Performed by: INTERNAL MEDICINE

## 2021-08-03 RX ORDER — POTASSIUM CHLORIDE 20 MEQ/1
20 TABLET, EXTENDED RELEASE ORAL DAILY PRN
Qty: 90 TABLET | Refills: 1 | Status: SHIPPED | OUTPATIENT
Start: 2021-08-03

## 2021-08-03 RX ORDER — CARVEDILOL 12.5 MG/1
12.5 TABLET ORAL 2 TIMES DAILY WITH MEALS
Qty: 180 TABLET | Refills: 3 | Status: SHIPPED | OUTPATIENT
Start: 2021-08-03

## 2021-08-03 RX ORDER — LOSARTAN POTASSIUM 50 MG/1
50 TABLET ORAL NIGHTLY
Qty: 90 TABLET | Refills: 3 | Status: SHIPPED | OUTPATIENT
Start: 2021-08-03

## 2021-08-03 NOTE — PROGRESS NOTES
Subjective:     Encounter Date:08/03/2021    Primary Care Physician: Katya Howard MD      Patient ID: Micaela Mc is a 71 y.o. female.    Chief Complaint:Follow-up    PROBLEM LIST:  1. Takotsubo cardiomyopathy  1. February 18, 2019 cardiac catheterization normal coronaries.  EF 20-25%.  LV gram consistent with Takot subo.  2. Echocardiogram February 20, 2019 EF of 50%.  [place] hypokinetic.  3. 9/18/2019 echo EF 50%.  Mild mitral regurgitation.  4. 11/24/19 echo EF 20% c/w Takotsubo cardiomyopathy. Mild MR.  5. 7/21/2020 echo EF of 60%.  No significant valvular disease.  2. Hypertension.  3. Dyslipidemia.  4. Murmur.  5. Type 2 diabetes mellitus.  6. GERD.  7. Peptic ulcer disease.  8. Depression.  9. Obstructive sleep apnea, intolerant to CPAP.  10. Iron deficiency.  11. Tonsillectomy.  12. Partial hysterectomy.  13.  Appendectomy.  14. Right ankle fracture.    15. Remote LAP-BAND surgery      Allergies   Allergen Reactions   • Codeine Itching   • Rocephin [Ceftriaxone] Itching     Has tolerated amoxicillin in past         Current Outpatient Medications:   •  atorvastatin (LIPITOR) 80 MG tablet, Take 1 tablet by mouth Every Night., Disp: 30 tablet, Rfl: 1  •  busPIRone (BUSPAR) 5 MG tablet, Take 2.5 mg by mouth Daily., Disp: , Rfl:   •  carvedilol (COREG) 12.5 MG tablet, Take 1 tablet by mouth 2 (Two) Times a Day With Meals., Disp: 180 tablet, Rfl: 3  •  cefuroxime (CEFTIN) 500 MG tablet, Take 500 mg by mouth 2 (Two) Times a Day., Disp: , Rfl:   •  Cholecalciferol (VITAMIN D3) 50 MCG (2000 UT) capsule, Take 2,000 Units by mouth Daily., Disp: , Rfl:   •  Cyanocobalamin (VITAMIN B-12 PO), Take  by mouth Daily., Disp: , Rfl:   •  dicyclomine (BENTYL) 20 MG tablet, Take 1 tablet by mouth Every 6 (Six) Hours As Needed (spasm)., Disp: 20 tablet, Rfl: 0  •  ferrous sulfate 324 (65 FE) MG tablet delayed-release EC tablet, Take 324 mg by mouth Daily With Breakfast., Disp: , Rfl:   •  FLUoxetine (PROzac) 20 MG  capsule, Take 40 mg by mouth Daily., Disp: , Rfl:   •  furosemide (LASIX) 40 MG tablet, Take 1 tablet by mouth Daily As Needed (edema)., Disp: 30 tablet, Rfl: 1  •  glimepiride (AMARYL) 1 MG tablet, Take 1 mg by mouth Every Morning Before Breakfast. 1.5 daily, Disp: , Rfl:   •  hyoscyamine (LEVSIN) 0.125 MG SL tablet, Take 0.125 mg by mouth Every 4 (Four) Hours As Needed for Cramping., Disp: , Rfl:   •  lansoprazole (PREVACID) 30 MG capsule, Take 30 mg by mouth Daily., Disp: , Rfl:   •  losartan (COZAAR) 50 MG tablet, Take 1 tablet by mouth Every Night., Disp: 30 tablet, Rfl: 11  •  metFORMIN (GLUCOPHAGE) 500 MG tablet, Take 500 mg by mouth 2 (Two) Times a Day With Meals., Disp: , Rfl:   •  nitroglycerin (NITROSTAT) 0.4 MG SL tablet, Place 1 tablet under the tongue Every 5 (Five) Minutes As Needed for Chest Pain. Take no more than 3 doses in 15 minutes., Disp: 30 tablet, Rfl: 0  •  ondansetron (ZOFRAN) 4 MG tablet, Take 4 mg by mouth Every 8 (Eight) Hours As Needed for Nausea or Vomiting., Disp: , Rfl:   •  potassium chloride (K-DUR,KLOR-CON) 20 MEQ CR tablet, Take 1 tablet by mouth Daily As Needed (edema)., Disp: 30 tablet, Rfl: 1  •  Sod Picosulfate-Mag Ox-Cit Acd 10-3.5-12 MG-GM -GM/160ML solution, Take 1 kit by mouth Take As Directed. Follow instructions that were mailed to your home. If you didn't receive these call [phone]., Disp: 2 bottle, Rfl: 0        History of Present Illness    Patient returns today for annual follow-up of Takotsubo's cardiomyopathy and cardiovascular risk factors.  Since her last visit she is overall doing very well she is quite active has no cardiovascular complaints.    The following portions of the patient's history were reviewed and updated as appropriate: allergies, current medications, past family history, past medical history, past social history, past surgical history and problem list.      Social History     Tobacco Use   • Smoking status: Never Smoker   • Smokeless  "tobacco: Never Used   Substance Use Topics   • Alcohol use: Not Currently     Comment: very rarely   • Drug use: No         ROS       Objective:   /74   Pulse 63   Ht 167.6 cm (66\")   Wt 98.2 kg (216 lb 8 oz)   LMP  (LMP Unknown)   SpO2 97%   BMI 34.94 kg/m²         Vitals reviewed.   Constitutional:       Appearance: Well-developed and not in distress.   Neck:      Thyroid: No thyromegaly.      Vascular: No carotid bruit or JVD.   Pulmonary:      Breath sounds: Normal breath sounds.   Cardiovascular:      Regular rhythm.      No gallop. No S3 and S4 gallop.   Abdominal:      General: Bowel sounds are normal.      Palpations: Abdomen is soft. There is no abdominal mass.      Tenderness: There is no abdominal tenderness.   Musculoskeletal:         General: No deformity.      Extremities: No clubbing present.Skin:     General: Skin is warm and dry.      Findings: No rash.   Neurological:      Mental Status: Alert and oriented to person, place, and time.         Procedures          Assessment:   Assessment/Plan      Diagnoses and all orders for this visit:    1. Essential hypertension (Primary)    2. Pure hypercholesterolemia    3. Takotsubo cardiomyopathy      1.  History of Takotsubo's cardiomyopathy, resolved.  2.  Dyslipidemia on moderate intensity statin with an LDL of 55  3.  Hypertension well-controlled    Recommendations  1.  Continue current medical therapy  2.  Revisit annually apparent symptom check       Chase Iverson MD      Dictated utilizing Dragon dictation  "

## 2021-08-09 ENCOUNTER — TRANSCRIBE ORDERS (OUTPATIENT)
Dept: ADMINISTRATIVE | Facility: HOSPITAL | Age: 71
End: 2021-08-09

## 2021-08-09 DIAGNOSIS — Z12.31 VISIT FOR SCREENING MAMMOGRAM: Primary | ICD-10-CM

## 2021-09-20 ENCOUNTER — APPOINTMENT (OUTPATIENT)
Dept: MAMMOGRAPHY | Facility: HOSPITAL | Age: 71
End: 2021-09-20

## 2021-11-17 ENCOUNTER — HOSPITAL ENCOUNTER (OUTPATIENT)
Dept: MAMMOGRAPHY | Facility: HOSPITAL | Age: 71
Discharge: HOME | End: 2021-11-17
Admitting: FAMILY MEDICINE

## 2021-11-17 DIAGNOSIS — Z12.31 VISIT FOR SCREENING MAMMOGRAM: ICD-10-CM

## 2021-11-17 PROCEDURE — 77063 BREAST TOMOSYNTHESIS BI: CPT

## 2021-11-17 PROCEDURE — 77063 BREAST TOMOSYNTHESIS BI: CPT | Performed by: RADIOLOGY

## 2021-11-17 PROCEDURE — 77067 SCR MAMMO BI INCL CAD: CPT

## 2021-11-17 PROCEDURE — 77067 SCR MAMMO BI INCL CAD: CPT | Performed by: RADIOLOGY

## 2022-08-10 ENCOUNTER — OFFICE VISIT (OUTPATIENT)
Dept: CARDIOLOGY | Facility: CLINIC | Age: 72
End: 2022-08-10

## 2022-08-10 VITALS
OXYGEN SATURATION: 95 % | DIASTOLIC BLOOD PRESSURE: 62 MMHG | BODY MASS INDEX: 34.66 KG/M2 | HEART RATE: 56 BPM | SYSTOLIC BLOOD PRESSURE: 104 MMHG | HEIGHT: 65 IN | WEIGHT: 208 LBS

## 2022-08-10 DIAGNOSIS — I51.81 TAKOTSUBO CARDIOMYOPATHY: Primary | ICD-10-CM

## 2022-08-10 DIAGNOSIS — I10 ESSENTIAL HYPERTENSION: ICD-10-CM

## 2022-08-10 DIAGNOSIS — E78.2 MIXED HYPERLIPIDEMIA: ICD-10-CM

## 2022-08-10 PROCEDURE — 99214 OFFICE O/P EST MOD 30 MIN: CPT | Performed by: INTERNAL MEDICINE

## 2022-08-10 RX ORDER — NITROGLYCERIN 0.4 MG/1
0.4 TABLET SUBLINGUAL
Qty: 50 TABLET | Refills: 3 | Status: SHIPPED | OUTPATIENT
Start: 2022-08-10

## 2022-08-10 NOTE — PROGRESS NOTES
Subjective:     Encounter Date:08/10/2022    Primary Care Physician: Katya Howard MD      Patient ID: Micaela Mc is a 72 y.o. female.    Chief Complaint:Follow-up    PROBLEM LIST:  1. Takotsubo cardiomyopathy  1. February 18, 2019 cardiac catheterization normal coronaries.  EF 20-25%.  LV gram consistent with Takot subo.  2. Echocardiogram February 20, 2019 EF of 50%.  [place] hypokinetic.  3. 9/18/2019 echo EF 50%.  Mild mitral regurgitation.  4. 11/24/19 echo EF 20% c/w Takotsubo cardiomyopathy. Mild MR.  5. 7/21/2020 echo EF of 60%.  No significant valvular disease.  2. Hypertension.  3. Dyslipidemia.  4. Murmur.  5. Type 2 diabetes mellitus.  6. GERD.  7. Peptic ulcer disease.  8. Depression.  9. Obstructive sleep apnea, intolerant to CPAP.  10. Iron deficiency.  11. Tonsillectomy.  12. Partial hysterectomy.  13.  Appendectomy.  14. Right ankle fracture.    15. Remote LAP-BAND surgery      Allergies   Allergen Reactions   • Codeine Itching   • Rocephin [Ceftriaxone] Itching     Has tolerated amoxicillin in past         Current Outpatient Medications:   •  atorvastatin (LIPITOR) 80 MG tablet, Take 1 tablet by mouth Every Night., Disp: 30 tablet, Rfl: 1  •  busPIRone (BUSPAR) 5 MG tablet, Take 2.5 mg by mouth Daily., Disp: , Rfl:   •  carvedilol (COREG) 12.5 MG tablet, Take 1 tablet by mouth 2 (Two) Times a Day With Meals., Disp: 180 tablet, Rfl: 3  •  cefuroxime (CEFTIN) 500 MG tablet, Take 500 mg by mouth 2 (Two) Times a Day., Disp: , Rfl:   •  Cholecalciferol (VITAMIN D3) 50 MCG (2000 UT) capsule, Take 2,000 Units by mouth Daily., Disp: , Rfl:   •  Cyanocobalamin (VITAMIN B-12 PO), Take  by mouth Daily., Disp: , Rfl:   •  dicyclomine (BENTYL) 20 MG tablet, Take 1 tablet by mouth Every 6 (Six) Hours As Needed (spasm)., Disp: 20 tablet, Rfl: 0  •  ferrous sulfate 324 (65 FE) MG tablet delayed-release EC tablet, Take 324 mg by mouth Daily With Breakfast., Disp: , Rfl:   •  FLUoxetine (PROzac) 20 MG  capsule, Take 40 mg by mouth Daily., Disp: , Rfl:   •  furosemide (LASIX) 40 MG tablet, Take 1 tablet by mouth Daily As Needed (edema)., Disp: 30 tablet, Rfl: 1  •  glimepiride (AMARYL) 1 MG tablet, Take 1 mg by mouth Every Morning Before Breakfast. 1.5 daily, Disp: , Rfl:   •  hyoscyamine (LEVSIN) 0.125 MG SL tablet, Take 0.125 mg by mouth Every 4 (Four) Hours As Needed for Cramping., Disp: , Rfl:   •  lansoprazole (PREVACID) 30 MG capsule, Take 30 mg by mouth Daily., Disp: , Rfl:   •  losartan (COZAAR) 50 MG tablet, Take 1 tablet by mouth Every Night., Disp: 90 tablet, Rfl: 3  •  metFORMIN (GLUCOPHAGE) 500 MG tablet, Take 500 mg by mouth 2 (Two) Times a Day With Meals., Disp: , Rfl:   •  nitroglycerin (NITROSTAT) 0.4 MG SL tablet, Place 1 tablet under the tongue Every 5 (Five) Minutes As Needed for Chest Pain. Take no more than 3 doses in 15 minutes., Disp: 30 tablet, Rfl: 0  •  ondansetron (ZOFRAN) 4 MG tablet, Take 4 mg by mouth Every 8 (Eight) Hours As Needed for Nausea or Vomiting., Disp: , Rfl:   •  potassium chloride (K-DUR,KLOR-CON) 20 MEQ CR tablet, Take 1 tablet by mouth Daily As Needed (edema)., Disp: 90 tablet, Rfl: 1  •  Sod Picosulfate-Mag Ox-Cit Acd 10-3.5-12 MG-GM -GM/160ML solution, Take 1 kit by mouth Take As Directed. Follow instructions that were mailed to your home. If you didn't receive these call [phone]., Disp: 2 bottle, Rfl: 0        History of Present Illness    Patient returns today for annual follow-up of Takotsubo's cardiomyopathy and cardiovascular risk factors.  Since her last visit she is overall doing very well she is quite active has no cardiovascular complaints. She has lost 10 pounds since her last visit.  She is trying to make healthier food choices and increase her activity.  She denies chest pain, dyspnea, orthopnea, palpitations or syncope.  Last echocardiogram in 2020 showed normalization of her LVEF to 60%. She is on statin therapy and tolerating without myalgias.  "She reports having recent blood work with PCP but we do not have those results on file.    The following portions of the patient's history were reviewed and updated as appropriate: allergies, current medications, past family history, past medical history, past social history, past surgical history and problem list.      Social History     Tobacco Use   • Smoking status: Never Smoker   • Smokeless tobacco: Never Used   Substance Use Topics   • Alcohol use: Not Currently     Comment: very rarely   • Drug use: No         Review of Systems   Constitutional: Negative for malaise/fatigue.   Eyes: Negative for vision loss in left eye and vision loss in right eye.   Cardiovascular: Negative for chest pain, dyspnea on exertion, near-syncope, orthopnea, palpitations, paroxysmal nocturnal dyspnea and syncope.   Musculoskeletal: Negative for myalgias.   Neurological: Negative for brief paralysis, excessive daytime sleepiness, focal weakness, numbness, paresthesias and weakness.   All other systems reviewed and are negative.         Objective:   Ht 165.1 cm (65\")   Wt 94.3 kg (208 lb)   LMP  (LMP Unknown)   BMI 34.61 kg/m²         Vitals reviewed.   Constitutional:       Appearance: Well-developed and not in distress.   Neck:      Thyroid: No thyromegaly.      Vascular: No carotid bruit or JVD.   Pulmonary:      Breath sounds: Normal breath sounds.   Cardiovascular:      Regular rhythm.      No gallop. No S3 and S4 gallop.   Abdominal:      General: Bowel sounds are normal.      Palpations: Abdomen is soft. There is no abdominal mass.      Tenderness: There is no abdominal tenderness.   Musculoskeletal:         General: No deformity.      Extremities: No clubbing present.Skin:     General: Skin is warm and dry.      Findings: No rash.   Neurological:      Mental Status: Alert and oriented to person, place, and time.         Procedures          Assessment:   Assessment & Plan      Diagnoses and all orders for this visit:    1. " Takotsubo cardiomyopathy (Primary)  · Continue Coreg and Losartan  · No s/s CHF  2. Essential hypertension  · Controlled  3. Mixed hyperlipidemia  · Continue Lipitor  · Obtain recent blood work from PCP      F/U 12 months or sooner if needed      DIONNE Hewitt

## 2022-10-11 ENCOUNTER — TRANSCRIBE ORDERS (OUTPATIENT)
Dept: ADMINISTRATIVE | Facility: HOSPITAL | Age: 72
End: 2022-10-11

## 2022-10-11 DIAGNOSIS — Z12.31 VISIT FOR SCREENING MAMMOGRAM: Primary | ICD-10-CM

## 2023-01-24 ENCOUNTER — HOSPITAL ENCOUNTER (OUTPATIENT)
Dept: MAMMOGRAPHY | Facility: HOSPITAL | Age: 73
Discharge: HOME | End: 2023-01-24
Admitting: FAMILY MEDICINE
Payer: MEDICARE

## 2023-01-24 DIAGNOSIS — Z12.31 VISIT FOR SCREENING MAMMOGRAM: ICD-10-CM

## 2023-01-24 PROCEDURE — 77063 BREAST TOMOSYNTHESIS BI: CPT | Performed by: RADIOLOGY

## 2023-01-24 PROCEDURE — 77067 SCR MAMMO BI INCL CAD: CPT

## 2023-01-24 PROCEDURE — 77063 BREAST TOMOSYNTHESIS BI: CPT

## 2023-01-24 PROCEDURE — 77067 SCR MAMMO BI INCL CAD: CPT | Performed by: RADIOLOGY

## 2023-01-28 ENCOUNTER — HOSPITAL ENCOUNTER (EMERGENCY)
Facility: HOSPITAL | Age: 73
Discharge: HOME | End: 2023-01-28
Attending: EMERGENCY MEDICINE | Admitting: EMERGENCY MEDICINE
Payer: MEDICARE

## 2023-01-28 ENCOUNTER — APPOINTMENT (OUTPATIENT)
Dept: CT IMAGING | Facility: HOSPITAL | Age: 73
End: 2023-01-28
Payer: MEDICARE

## 2023-01-28 VITALS
BODY MASS INDEX: 32.78 KG/M2 | DIASTOLIC BLOOD PRESSURE: 76 MMHG | HEART RATE: 65 BPM | OXYGEN SATURATION: 96 % | HEIGHT: 66 IN | SYSTOLIC BLOOD PRESSURE: 128 MMHG | RESPIRATION RATE: 17 BRPM | TEMPERATURE: 98 F | WEIGHT: 204 LBS

## 2023-01-28 DIAGNOSIS — R11.2 NAUSEA AND VOMITING IN ADULT: Primary | ICD-10-CM

## 2023-01-28 DIAGNOSIS — E86.0 MILD DEHYDRATION: ICD-10-CM

## 2023-01-28 DIAGNOSIS — K57.92 DIVERTICULITIS: ICD-10-CM

## 2023-01-28 LAB
ALBUMIN SERPL-MCNC: 4.4 G/DL (ref 3.5–5.2)
ALBUMIN/GLOB SERPL: 1.3 G/DL
ALP SERPL-CCNC: 117 U/L (ref 39–117)
ALT SERPL W P-5'-P-CCNC: 18 U/L (ref 1–33)
ANION GAP SERPL CALCULATED.3IONS-SCNC: 12 MMOL/L (ref 5–15)
AST SERPL-CCNC: 17 U/L (ref 1–32)
BACTERIA UR QL AUTO: ABNORMAL /HPF
BASOPHILS # BLD AUTO: 0.02 10*3/MM3 (ref 0–0.2)
BASOPHILS NFR BLD AUTO: 0.2 % (ref 0–1.5)
BILIRUB SERPL-MCNC: 0.5 MG/DL (ref 0–1.2)
BILIRUB UR QL STRIP: NEGATIVE
BUN SERPL-MCNC: 15 MG/DL (ref 8–23)
BUN/CREAT SERPL: 12.9 (ref 7–25)
CALCIUM SPEC-SCNC: 9.8 MG/DL (ref 8.6–10.5)
CHLORIDE SERPL-SCNC: 102 MMOL/L (ref 98–107)
CLARITY UR: ABNORMAL
CO2 SERPL-SCNC: 27 MMOL/L (ref 22–29)
COLOR UR: YELLOW
CREAT SERPL-MCNC: 1.16 MG/DL (ref 0.57–1)
DEPRECATED RDW RBC AUTO: 45.1 FL (ref 37–54)
EGFRCR SERPLBLD CKD-EPI 2021: 49.9 ML/MIN/1.73
EOSINOPHIL # BLD AUTO: 0.11 10*3/MM3 (ref 0–0.4)
EOSINOPHIL NFR BLD AUTO: 1 % (ref 0.3–6.2)
ERYTHROCYTE [DISTWIDTH] IN BLOOD BY AUTOMATED COUNT: 13.2 % (ref 12.3–15.4)
FLUAV RNA RESP QL NAA+PROBE: NOT DETECTED
FLUBV RNA RESP QL NAA+PROBE: NOT DETECTED
GLOBULIN UR ELPH-MCNC: 3.4 GM/DL
GLUCOSE SERPL-MCNC: 193 MG/DL (ref 65–99)
GLUCOSE UR STRIP-MCNC: NEGATIVE MG/DL
HCT VFR BLD AUTO: 39.6 % (ref 34–46.6)
HGB BLD-MCNC: 12.9 G/DL (ref 12–15.9)
HGB UR QL STRIP.AUTO: NEGATIVE
HOLD SPECIMEN: NORMAL
HYALINE CASTS UR QL AUTO: ABNORMAL /LPF
IMM GRANULOCYTES # BLD AUTO: 0.04 10*3/MM3 (ref 0–0.05)
IMM GRANULOCYTES NFR BLD AUTO: 0.4 % (ref 0–0.5)
KETONES UR QL STRIP: ABNORMAL
LEUKOCYTE ESTERASE UR QL STRIP.AUTO: ABNORMAL
LIPASE SERPL-CCNC: 17 U/L (ref 13–60)
LYMPHOCYTES # BLD AUTO: 2.69 10*3/MM3 (ref 0.7–3.1)
LYMPHOCYTES NFR BLD AUTO: 24.6 % (ref 19.6–45.3)
MCH RBC QN AUTO: 30.6 PG (ref 26.6–33)
MCHC RBC AUTO-ENTMCNC: 32.6 G/DL (ref 31.5–35.7)
MCV RBC AUTO: 94.1 FL (ref 79–97)
MONOCYTES # BLD AUTO: 0.71 10*3/MM3 (ref 0.1–0.9)
MONOCYTES NFR BLD AUTO: 6.5 % (ref 5–12)
NEUTROPHILS NFR BLD AUTO: 67.3 % (ref 42.7–76)
NEUTROPHILS NFR BLD AUTO: 7.35 10*3/MM3 (ref 1.7–7)
NITRITE UR QL STRIP: NEGATIVE
NRBC BLD AUTO-RTO: 0 /100 WBC (ref 0–0.2)
PH UR STRIP.AUTO: <=5 [PH] (ref 5–8)
PLATELET # BLD AUTO: 211 10*3/MM3 (ref 140–450)
PMV BLD AUTO: 11.2 FL (ref 6–12)
POTASSIUM SERPL-SCNC: 4.1 MMOL/L (ref 3.5–5.2)
PROT SERPL-MCNC: 7.8 G/DL (ref 6–8.5)
PROT UR QL STRIP: NEGATIVE
RBC # BLD AUTO: 4.21 10*6/MM3 (ref 3.77–5.28)
RBC # UR STRIP: ABNORMAL /HPF
REF LAB TEST METHOD: ABNORMAL
SARS-COV-2 RNA RESP QL NAA+PROBE: NOT DETECTED
SODIUM SERPL-SCNC: 141 MMOL/L (ref 136–145)
SP GR UR STRIP: 1.02 (ref 1–1.03)
SQUAMOUS #/AREA URNS HPF: ABNORMAL /HPF
TROPONIN T SERPL-MCNC: <0.01 NG/ML (ref 0–0.03)
UROBILINOGEN UR QL STRIP: ABNORMAL
WBC # UR STRIP: ABNORMAL /HPF
WBC NRBC COR # BLD: 10.92 10*3/MM3 (ref 3.4–10.8)
WHOLE BLOOD HOLD COAG: NORMAL
WHOLE BLOOD HOLD SPECIMEN: NORMAL

## 2023-01-28 PROCEDURE — 84484 ASSAY OF TROPONIN QUANT: CPT | Performed by: EMERGENCY MEDICINE

## 2023-01-28 PROCEDURE — 80053 COMPREHEN METABOLIC PANEL: CPT | Performed by: EMERGENCY MEDICINE

## 2023-01-28 PROCEDURE — 81001 URINALYSIS AUTO W/SCOPE: CPT | Performed by: EMERGENCY MEDICINE

## 2023-01-28 PROCEDURE — 25010000002 ONDANSETRON PER 1 MG: Performed by: EMERGENCY MEDICINE

## 2023-01-28 PROCEDURE — 74176 CT ABD & PELVIS W/O CONTRAST: CPT

## 2023-01-28 PROCEDURE — 85025 COMPLETE CBC W/AUTO DIFF WBC: CPT | Performed by: EMERGENCY MEDICINE

## 2023-01-28 PROCEDURE — 99284 EMERGENCY DEPT VISIT MOD MDM: CPT

## 2023-01-28 PROCEDURE — 93005 ELECTROCARDIOGRAM TRACING: CPT | Performed by: EMERGENCY MEDICINE

## 2023-01-28 PROCEDURE — 96374 THER/PROPH/DIAG INJ IV PUSH: CPT

## 2023-01-28 PROCEDURE — 87636 SARSCOV2 & INF A&B AMP PRB: CPT | Performed by: EMERGENCY MEDICINE

## 2023-01-28 PROCEDURE — 93005 ELECTROCARDIOGRAM TRACING: CPT

## 2023-01-28 PROCEDURE — 83690 ASSAY OF LIPASE: CPT | Performed by: EMERGENCY MEDICINE

## 2023-01-28 RX ORDER — ONDANSETRON 4 MG/1
4 TABLET, ORALLY DISINTEGRATING ORAL EVERY 6 HOURS PRN
Qty: 20 TABLET | Refills: 0 | Status: SHIPPED | OUTPATIENT
Start: 2023-01-28 | End: 2023-02-02

## 2023-01-28 RX ORDER — OXYCODONE HYDROCHLORIDE AND ACETAMINOPHEN 5; 325 MG/1; MG/1
1 TABLET ORAL EVERY 6 HOURS PRN
Qty: 12 TABLET | Refills: 0 | Status: SHIPPED | OUTPATIENT
Start: 2023-01-28

## 2023-01-28 RX ORDER — ONDANSETRON 2 MG/ML
4 INJECTION INTRAMUSCULAR; INTRAVENOUS ONCE
Status: COMPLETED | OUTPATIENT
Start: 2023-01-28 | End: 2023-01-28

## 2023-01-28 RX ORDER — AMOXICILLIN AND CLAVULANATE POTASSIUM 875; 125 MG/1; MG/1
1 TABLET, FILM COATED ORAL EVERY 12 HOURS
Qty: 14 TABLET | Refills: 0 | Status: SHIPPED | OUTPATIENT
Start: 2023-01-28 | End: 2023-02-04

## 2023-01-28 RX ORDER — SODIUM CHLORIDE 0.9 % (FLUSH) 0.9 %
10 SYRINGE (ML) INJECTION AS NEEDED
Status: DISCONTINUED | OUTPATIENT
Start: 2023-01-28 | End: 2023-01-28 | Stop reason: HOSPADM

## 2023-01-28 RX ADMIN — ONDANSETRON 4 MG: 2 INJECTION INTRAMUSCULAR; INTRAVENOUS at 06:50

## 2023-01-28 RX ADMIN — SODIUM CHLORIDE 1000 ML: 9 INJECTION, SOLUTION INTRAVENOUS at 06:49

## 2023-02-28 LAB
QT INTERVAL: 364 MS
QTC INTERVAL: 401 MS

## 2023-05-27 ENCOUNTER — HOSPITAL ENCOUNTER (EMERGENCY)
Facility: HOSPITAL | Age: 73
Discharge: HOME | End: 2023-05-27
Attending: EMERGENCY MEDICINE
Payer: MEDICARE

## 2023-05-27 ENCOUNTER — APPOINTMENT (OUTPATIENT)
Dept: GENERAL RADIOLOGY | Facility: HOSPITAL | Age: 73
End: 2023-05-27
Payer: MEDICARE

## 2023-05-27 VITALS
HEART RATE: 61 BPM | OXYGEN SATURATION: 97 % | BODY MASS INDEX: 34.99 KG/M2 | DIASTOLIC BLOOD PRESSURE: 65 MMHG | RESPIRATION RATE: 17 BRPM | TEMPERATURE: 98.2 F | HEIGHT: 65 IN | WEIGHT: 210 LBS | SYSTOLIC BLOOD PRESSURE: 158 MMHG

## 2023-05-27 DIAGNOSIS — S42.214A CLOSED NONDISPLACED FRACTURE OF SURGICAL NECK OF RIGHT HUMERUS, UNSPECIFIED FRACTURE MORPHOLOGY, INITIAL ENCOUNTER: Primary | ICD-10-CM

## 2023-05-27 PROCEDURE — 73030 X-RAY EXAM OF SHOULDER: CPT

## 2023-05-27 PROCEDURE — 99283 EMERGENCY DEPT VISIT LOW MDM: CPT

## 2023-05-27 RX ORDER — HYDROCODONE BITARTRATE AND ACETAMINOPHEN 5; 325 MG/1; MG/1
1 TABLET ORAL ONCE
Status: COMPLETED | OUTPATIENT
Start: 2023-05-27 | End: 2023-05-27

## 2023-05-27 RX ORDER — NALOXONE HYDROCHLORIDE 4 MG/.1ML
SPRAY NASAL
Qty: 2 EACH | Refills: 0 | Status: SHIPPED | OUTPATIENT
Start: 2023-05-27

## 2023-05-27 RX ORDER — HYDROCODONE BITARTRATE AND ACETAMINOPHEN 5; 325 MG/1; MG/1
1 TABLET ORAL EVERY 6 HOURS PRN
Qty: 12 TABLET | Refills: 0 | Status: SHIPPED | OUTPATIENT
Start: 2023-05-27

## 2023-05-27 RX ADMIN — HYDROCODONE BITARTRATE AND ACETAMINOPHEN 1 TABLET: 5; 325 TABLET ORAL at 12:02

## 2023-05-27 NOTE — ED PROVIDER NOTES
EMERGENCY DEPARTMENT ENCOUNTER    Pt Name: Micaela Mc  MRN: [id number]  Pt :   1950  Room Number:  24SF/24  Date of encounter:  2023  PCP: Katya Howard MD  ED Provider: DIONNE Prince    Historian: Patient   HPI:  Chief Complaint: Rt shoulder pain.      Context: Micaela Mc is a 72 y.o. female who presents to the ED c/o Rt shoulder pain.  Patient reports a trip and fall last night.  Patient landed on her right shoulder.  She denies hitting her head or any loss of consciousness.  Patient's not on any blood thinners.  Patient complains of discomfort in her right upper extremity at the anterior shoulder with any movement.  She denies pain in her right hand, right wrist, right forearm and elbow.  HPI     REVIEW OF SYSTEMS  A chief complaint appropriate review of systems was completed and is negative except as noted in the HPI.     PAST MEDICAL HISTORY  Past Medical History:   Diagnosis Date   • Anemia    • CHF (congestive heart failure)    • Coronary artery disease    • Diabetes mellitus    • GERD (gastroesophageal reflux disease)    • Hyperlipidemia    • Hypertension        PAST SURGICAL HISTORY  Past Surgical History:   Procedure Laterality Date   • APPENDECTOMY     • BREAST CYST ASPIRATION      pt doesn't remember which breast   • CARDIAC CATHETERIZATION N/A 2019    Procedure: LEFT HEART CATH;  Surgeon: Martir Cutler MD;  Location:  [facility];  Service: Cardiovascular   • ENDOSCOPY N/A 5/15/2017    Procedure: ESOPHAGOGASTRODUODENOSCOPY;  Surgeon: Jennifer Ingram MD;  Location:  [facility];  Service:    • HYSTERECTOMY      age 41   • LAPAROSCOPIC GASTRIC BANDING         FAMILY HISTORY  Family History   Problem Relation Age of Onset   • Breast cancer Neg Hx    • Colon cancer Neg Hx    • Endometrial cancer Neg Hx    • Ovarian cancer Neg Hx        SOCIAL HISTORY  Social History     Socioeconomic History   • Marital status:     Tobacco Use   • Smoking status: Never   • Smokeless tobacco: Never   Substance and Sexual Activity   • Alcohol use: Not Currently     Comment: very rarely   • Drug use: No   • Sexual activity: Defer       ALLERGIES  Codeine and Rocephin [ceftriaxone]    PHYSICAL EXAM  Physical Exam  Vitals and nursing note reviewed.   Constitutional:       General: She is in acute distress.      Appearance: Normal appearance. She is not ill-appearing.      Comments: Patient is guarding her movements with her right upper extremity.   HENT:      Head: Normocephalic and atraumatic.      Nose: Nose normal.      Mouth/Throat:      Mouth: Mucous membranes are moist.   Eyes:      Extraocular Movements: Extraocular movements intact.      Pupils: Pupils are equal, round, and reactive to light.   Cardiovascular:      Rate and Rhythm: Normal rate and regular rhythm.      Pulses: Normal pulses.      Heart sounds: Normal heart sounds.   Pulmonary:      Effort: Pulmonary effort is normal.      Breath sounds: Normal breath sounds.   Musculoskeletal:         General: Tenderness (Rt anterior shoulder pain, Limited ROM.  pulses palpable. ) present.      Cervical back: Normal range of motion and neck supple. No tenderness.   Skin:     General: Skin is warm and dry.   Neurological:      Mental Status: She is alert and oriented to person, place, and time.   Psychiatric:         Behavior: Behavior normal.           LAB RESULTS  Results for orders placed or performed during the hospital encounter of 01/28/23   COVID-19 and FLU A/B PCR - Swab, Nasopharynx    Specimen: Nasopharynx; Swab   Result Value Ref Range    COVID19 Not Detected Not Detected - Ref. Range    Influenza A PCR Not Detected Not Detected    Influenza B PCR Not Detected Not Detected   Comprehensive Metabolic Panel    Specimen: Blood   Result Value Ref Range    Glucose 193 (H) 65 - 99 mg/dL    BUN 15 8 - 23 mg/dL    Creatinine 1.16 (H) 0.57 - 1.00 mg/dL    Sodium 141 136 - 145 mmol/L     Potassium 4.1 3.5 - 5.2 mmol/L    Chloride 102 98 - 107 mmol/L    CO2 27.0 22.0 - 29.0 mmol/L    Calcium 9.8 8.6 - 10.5 mg/dL    Total Protein 7.8 6.0 - 8.5 g/dL    Albumin 4.4 3.5 - 5.2 g/dL    ALT (SGPT) 18 1 - 33 U/L    AST (SGOT) 17 1 - 32 U/L    Alkaline Phosphatase 117 39 - 117 U/L    Total Bilirubin 0.5 0.0 - 1.2 mg/dL    Globulin 3.4 gm/dL    A/G Ratio 1.3 g/dL    BUN/Creatinine Ratio 12.9 7.0 - 25.0    Anion Gap 12.0 5.0 - 15.0 mmol/L    eGFR 49.9 (L) >60.0 mL/min/1.73   Lipase    Specimen: Blood   Result Value Ref Range    Lipase 17 13 - 60 U/L   Troponin    Specimen: Blood   Result Value Ref Range    Troponin T <0.010 0.000 - 0.030 ng/mL   Urinalysis With Microscopic If Indicated (No Culture) - Urine, Clean Catch    Specimen: Urine, Clean Catch   Result Value Ref Range    Color, UA Yellow Yellow, Straw    Appearance, UA Cloudy (A) Clear    pH, UA <=5.0 5.0 - 8.0    Specific Gravity, UA 1.023 1.001 - 1.030    Glucose, UA Negative Negative    Ketones, UA Trace (A) Negative    Bilirubin, UA Negative Negative    Blood, UA Negative Negative    Protein, UA Negative Negative    Leuk Esterase, UA Trace (A) Negative    Nitrite, UA Negative Negative    Urobilinogen, UA 0.2 E.U./dL 0.2 - 1.0 E.U./dL   CBC Auto Differential    Specimen: Blood   Result Value Ref Range    WBC 10.92 (H) 3.40 - 10.80 10*3/mm3    RBC 4.21 3.77 - 5.28 10*6/mm3    Hemoglobin 12.9 12.0 - 15.9 g/dL    Hematocrit 39.6 34.0 - 46.6 %    MCV 94.1 79.0 - 97.0 fL    MCH 30.6 26.6 - 33.0 pg    MCHC 32.6 31.5 - 35.7 g/dL    RDW 13.2 12.3 - 15.4 %    RDW-SD 45.1 37.0 - 54.0 fl    MPV 11.2 6.0 - 12.0 fL    Platelets 211 140 - 450 10*3/mm3    Neutrophil % 67.3 42.7 - 76.0 %    Lymphocyte % 24.6 19.6 - 45.3 %    Monocyte % 6.5 5.0 - 12.0 %    Eosinophil % 1.0 0.3 - 6.2 %    Basophil % 0.2 0.0 - 1.5 %    Immature Grans % 0.4 0.0 - 0.5 %    Neutrophils, Absolute 7.35 (H) 1.70 - 7.00 10*3/mm3    Lymphocytes, Absolute 2.69 0.70 - 3.10 10*3/mm3    Monocytes,  Absolute 0.71 0.10 - 0.90 10*3/mm3    Eosinophils, Absolute 0.11 0.00 - 0.40 10*3/mm3    Basophils, Absolute 0.02 0.00 - 0.20 10*3/mm3    Immature Grans, Absolute 0.04 0.00 - 0.05 10*3/mm3    nRBC 0.0 0.0 - 0.2 /100 WBC   Urinalysis, Microscopic Only - Urine, Clean Catch    Specimen: Urine, Clean Catch   Result Value Ref Range    RBC, UA 0-2 None Seen, 0-2 /HPF    WBC, UA 6-12 (A) None Seen, 0-2 /HPF    Bacteria, UA None Seen None Seen, Trace /HPF    Squamous Epithelial Cells, UA 3-6 (A) None Seen, 0-2 /HPF    Hyaline Casts, UA 7-12 0 - 6 /LPF    Methodology Automated Microscopy    ECG 12 Lead ED Triage Standing Order; Abdominal Pain (Upper)   Result Value Ref Range    QT Interval 364 ms    QTC Interval 401 ms   Green Top (Gel)   Result Value Ref Range    Extra Tube Hold for add-ons.    Lavender Top   Result Value Ref Range    Extra Tube hold for add-on    Gold Top - SST   Result Value Ref Range    Extra Tube Hold for add-ons.    Gray Top   Result Value Ref Range    Extra Tube Hold for add-ons.    Light Blue Top   Result Value Ref Range    Extra Tube Hold for add-ons.        If labs were ordered, I independently reviewed the results and considered them in treating the patient.    RADIOLOGY  XR Shoulder 2+ View Right   Final Result   Impression:   Right humeral surgical neck fracture         Electronically Signed: Ector Lozada     5/27/2023 12:39 PM EDT     Workstation ID: [id number]        [] Radiologist's Report Reviewed:  I ordered and independently reviewed the above noted radiographic studies.  See radiologist's dictation for official interpretation.      PROCEDURES    Procedures    No orders to display       MEDICATIONS GIVEN IN ER    Medications   HYDROcodone-acetaminophen (NORCO) 5-325 MG per tablet 1 tablet (1 tablet Oral Given 5/27/23 1202)       MEDICAL DECISION MAKING, PROGRESS, and CONSULTS   Medical Decision Making   Micaela Mc is a 72 y.o. female who presents to the ED c/o Rt shoulder  pain.  Patient reports a trip and fall last night.  Patient landed on her right shoulder.  She denies hitting her head or any loss of consciousness.  Patient's not on any blood thinners.  Patient complains of discomfort in her right upper extremity at the anterior shoulder with any movement.  She denies pain in her right hand, right wrist, right forearm and elbow.      Closed nondisplaced fracture of surgical neck of right humerus, unspecified fracture morphology, initial encounter: acute illness or injury     Details: Patient landed on her right upper extremity following a fall.  Patient has a fracture to the surgical neck of her right humerus.  Patient to be placed in a sling.  Patient to follow-up with Ortho.  Amount and/or Complexity of Data Reviewed  Radiology: ordered. Decision-making details documented in ED Course.      Risk  Prescription drug management.          All labs have been independently reviewed by me.  All radiology studies have been reviewed by me and the radiologist dictating the report.  All EKG's have been independently viewed by me.    [] Discussed with radiology regarding test interpretation:    Discussion below represents my analysis of pertinent findings related to patient's condition, differential diagnosis, treatment plan and final disposition.    Differential diagnosis:  The differential diagnosis associated with the patient's presentation includes: Fracture, dislocation, contusion.    Additional sources  Discussed/ obtained information from independent historians:   [] Spouse  [] Parent  [] Family member  [] Friend  [] EMS   [] Other:  External (non-ED) record review:   [x] Inpatient record:   [] Office record:   [] Outpatient record:   [x] Prior Outpatient labs:   [x] Prior Outpatient radiology:   [] Primary Care record:   [] Outside ED record:   [] Other:   Patient's care impacted by:   [x] Diabetes  [x] Hypertension  [x] Hyperlipidemia  [] Hypothyroidism   [x] Coronary Artery  Disease   [] COPD   [] Cancer   [] Obesity  [x] GERD   [] Tobacco Abuse   [] Substance Abuse    [] Anxiety   [] Depression   [] Other:   Care significantly affected by Social Determinants of Health (housing and economic circumstances, unemployment)    [] Yes     [x] No   If yes, Patient's care significantly limited by  Social Determinants of Health including:   [] Inadequate housing   [] Low income   [] Alcoholism and drug addiction in family   [] Problems related to primary support group   [] Unemployment   [] Problems related to employment   [] Other Social Determinants of Health:     Shared decision making: Shared decision making with patient.  Patient has a fracture to the right shoulder.  Pt to wear sling.  Pt to f/u with ortho. Pt to take meds as ordered.     Orders placed during this visit:  Orders Placed This Encounter   Procedures   • [facility] 02.  Shoulder Immobilizer/Sling   • XR Shoulder 2+ View Right       I considered prescription management  with:   [] Pain medication  [] Antiviral  [] Antibiotic   [] Other:   Rationale:  Additional orders considered but not ordered:  The following testing was considered but ultimately not selected after discussion with patient/family:    ED Course:    ED Course as of 05/27/23 1453   Sat May 27, 2023   1300 X-ray reviewed.  Patient has a fracture to her right surgical neck. [KG]      ED Course User Index  [KG] Lety Chow APRN            DIAGNOSIS  Final diagnoses:   Closed nondisplaced fracture of surgical neck of right humerus, unspecified fracture morphology, initial encounter       DISPOSITION    ED Disposition     ED Disposition   Discharge    Condition   Stable    Comment   --             Please note that portions of this document were completed with voice recognition software.      Lety Chow APRN  05/27/23 [id number]

## 2023-06-09 ENCOUNTER — OFFICE VISIT (OUTPATIENT)
Dept: ORTHOPEDIC SURGERY | Facility: CLINIC | Age: 73
End: 2023-06-09
Payer: MEDICARE

## 2023-06-09 VITALS
HEIGHT: 67 IN | SYSTOLIC BLOOD PRESSURE: 110 MMHG | WEIGHT: 205.2 LBS | DIASTOLIC BLOOD PRESSURE: 70 MMHG | BODY MASS INDEX: 32.21 KG/M2

## 2023-06-09 DIAGNOSIS — S42.291A CLOSED 3-PART FRACTURE OF PROXIMAL END OF RIGHT HUMERUS, INITIAL ENCOUNTER: Primary | ICD-10-CM

## 2023-06-09 DIAGNOSIS — S42.201A CLOSED FRACTURE OF PROXIMAL END OF RIGHT HUMERUS, UNSPECIFIED FRACTURE MORPHOLOGY, INITIAL ENCOUNTER: ICD-10-CM

## 2023-06-09 DIAGNOSIS — M25.511 RIGHT SHOULDER PAIN, UNSPECIFIED CHRONICITY: ICD-10-CM

## 2023-06-09 RX ORDER — FLUOXETINE HYDROCHLORIDE 40 MG/1
CAPSULE ORAL
COMMUNITY
Start: 2023-04-28

## 2023-06-09 RX ORDER — CARVEDILOL 25 MG/1
TABLET ORAL
COMMUNITY
Start: 2023-05-18

## 2023-06-09 RX ORDER — TRAMADOL HYDROCHLORIDE 50 MG/1
TABLET ORAL
COMMUNITY
Start: 2023-06-01

## 2023-06-09 NOTE — PROGRESS NOTES
[facility] [place] [facility] Office Visit - Marciano Masterson MD    Office Visit       Patient Name: Micaela Mc    Chief Complaint:   Chief Complaint   Patient presents with    Right Shoulder - Pain       Referring Physician: No ref. provider found    History of Present Illness:   Micaela Mc is a 73 y.o. female who presents with right body part: shoulder Reason: pain.  Onset:Onset: mechanical fall. The issue has been ongoing for 14 day(s). Pain is a 4/10 on the pain scale. Pain is described as Pain Characterization: dull. Associated symptoms include Symptoms: pain. The pain is worse with lying on affected side; resting, sitting, and pain medication and/or NSAID improve the pain. Previous treatments have included: bracing.  I have reviewed the patient's history of present illness as noted/entered above.    I have reviewed the patient's past medical history, surgical history, social history, family history, medications, and allergies as noted in the electronic medical record and as noted/entered.  I have reviewed the patient's review of systems as noted/enter and updated as noted in the patient's HPI.    ER visit 5/27/2023  ER note reviewed  Right shoulder proximal humeral fracture    Supportive  present  [facility]    Treated with sling      73 y.o. female  Body mass index is 32.62 kg/m².    Subjective   Subjective      Review of Systems   Constitutional: Negative.  Negative for chills, fatigue and fever.   HENT: Negative.  Negative for congestion and dental problem.    Eyes: Negative.  Negative for blurred vision.   Respiratory: Negative.  Negative for shortness of breath.    Cardiovascular: Negative.  Negative for leg swelling.   Gastrointestinal: Negative.  Negative for abdominal pain.   Endocrine: Negative.  Negative for polyuria.   Genitourinary: Negative.  Negative for difficulty urinating.    Musculoskeletal:  Positive for arthralgias.   Skin: Negative.    Allergic/Immunologic: Negative.    Neurological: Negative.    Hematological: Negative.  Negative for adenopathy.   Psychiatric/Behavioral: Negative.  Negative for behavioral problems.       Past Medical History:   Past Medical History:   Diagnosis Date    Anemia     CHF (congestive heart failure)     Coronary artery disease     Diabetes mellitus     GERD (gastroesophageal reflux disease)     Hyperlipidemia     Hypertension        Past Surgical History:   Past Surgical History:   Procedure Laterality Date    APPENDECTOMY      BREAST CYST ASPIRATION  1999    pt doesn't remember which breast    CARDIAC CATHETERIZATION N/A 2/18/2019    Procedure: LEFT HEART CATH;  Surgeon: Martir Cutler MD;  Location:  [facility]ASIVE [facility];  Service: Cardiovascular    ENDOSCOPY N/A 5/15/2017    Procedure: ESOPHAGOGASTRODUODENOSCOPY;  Surgeon: Jennifer Ingram MD;  Location:  [facility]OSCOPY;  Service:     HYSTERECTOMY      age 41    LAPAROSCOPIC GASTRIC BANDING         Family History:   Family History   Problem Relation Age of Onset    Breast cancer Neg Hx     Colon cancer Neg Hx     Endometrial cancer Neg Hx     Ovarian cancer Neg Hx        Social History:   Social History     Socioeconomic History    Marital status:    Tobacco Use    Smoking status: Never    Smokeless tobacco: Never   Substance and Sexual Activity    Alcohol use: Not Currently     Comment: very rarely    Drug use: No    Sexual activity: Defer       Medications:   Current Outpatient Medications:     atorvastatin (LIPITOR) 80 MG tablet, Take 1 tablet by mouth Every Night., Disp: 30 tablet, Rfl: 1    busPIRone (BUSPAR) 5 MG tablet, Take 2.5 mg by mouth Daily., Disp: , Rfl:     carvedilol (COREG) 25 MG tablet, , Disp: , Rfl:     Cholecalciferol (VITAMIN D3) 50 MCG (2000 UT) capsule, Take 1 capsule by mouth Daily., Disp: , Rfl:     Cyanocobalamin (VITAMIN B-12 PO), Take  by mouth Daily.,  Disp: , Rfl:     dicyclomine (BENTYL) 20 MG tablet, Take 1 tablet by mouth Every 6 (Six) Hours As Needed (spasm)., Disp: 20 tablet, Rfl: 0    ferrous sulfate 324 (65 FE) MG tablet delayed-release EC tablet, Take 1 tablet by mouth Daily With Breakfast., Disp: , Rfl:     FLUoxetine (PROzac) 40 MG capsule, , Disp: , Rfl:     furosemide (LASIX) 40 MG tablet, Take 1 tablet by mouth Daily As Needed (edema)., Disp: 30 tablet, Rfl: 1    glimepiride (AMARYL) 1 MG tablet, Take 1 tablet by mouth Every Morning Before Breakfast. 1.5 daily, Disp: , Rfl:     HYDROcodone-acetaminophen (NORCO) 5-325 MG per tablet, Take 1 tablet by mouth Every 6 (Six) Hours As Needed for Moderate Pain., Disp: 12 tablet, Rfl: 0    hyoscyamine (LEVSIN) 0.125 MG SL tablet, Take 1 tablet by mouth Every 4 (Four) Hours As Needed for Cramping., Disp: , Rfl:     lansoprazole (PREVACID) 30 MG capsule, Take 1 capsule by mouth Daily., Disp: , Rfl:     losartan (COZAAR) 50 MG tablet, Take 1 tablet by mouth Every Night., Disp: 90 tablet, Rfl: 3    metFORMIN (GLUCOPHAGE) 500 MG tablet, Take 1 tablet by mouth 2 (Two) Times a Day With Meals., Disp: , Rfl:     naloxone (NARCAN) 4 MG/0.1ML nasal spray, Call 911. Don't prime. [place] in 1 nostril for overdose. Repeat in 2-3 minutes in other nostril if no or minimal breathing/responsiveness., Disp: 2 each, Rfl: 0    nitroglycerin (NITROSTAT) 0.4 MG SL tablet, Place 1 tablet under the tongue Every 5 (Five) Minutes As Needed for Chest Pain. Take no more than 3 doses in 15 minutes., Disp: 50 tablet, Rfl: 3    ondansetron (ZOFRAN) 4 MG tablet, Take 1 tablet by mouth Every 8 (Eight) Hours As Needed for Nausea or Vomiting., Disp: , Rfl:     oxyCODONE-acetaminophen (PERCOCET) 5-325 MG per tablet, Take 1 tablet by mouth Every 6 (Six) Hours As Needed for Moderate Pain or Severe Pain for up to 12 doses., Disp: 12 tablet, Rfl: 0    potassium chloride (K-DUR,KLOR-CON) 20 MEQ CR tablet, Take 1 tablet by mouth Daily As Needed (edema).,  "Disp: 90 tablet, Rfl: 1    traMADol (ULTRAM) 50 MG tablet, , Disp: , Rfl:     Allergies:   Allergies   Allergen Reactions    Codeine Itching    Rocephin [Ceftriaxone] Itching     Has tolerated amoxicillin in past       The following portions of the patient's history were reviewed and updated as appropriate: allergies, current medications, past family history, past medical history, past social history, past surgical history and problem list.        Objective    Objective      Vital Signs:   Vitals:    06/09/23 1006   BP: 110/70   Weight: 93.1 kg (205 lb 3.2 oz)   Height: 168.9 cm (66.5\")       Ortho Exam:  Bruising more distally no elbow pain no forearm wrist or hand pain today pain at the proximal humerus with known proximal humeral fracture, sling in place    Results Review:   Imaging Results (Last 24 Hours)       Procedure Component Value Units Date/Time    XR Shoulder 2+ View Right [[id number]] Resulted: 06/09/23 1058     Updated: 06/09/23 1059    Narrative:      Imaging: shoulder x-rays 3 views - AP, axillary, and scapular-Y x-ray   views    Side: RIGHT SHOULDER    Indication for shoulder x-ray 3 views: shoulder pain    Comparison: ER comparison views available    Findings: RIGHT shoulder 3 part proximal humeral fracture with baseline   displacement - reasonable alignment is maintained.    I personally reviewed the above x-rays.          XR Shoulder 2+ View Right    Result Date: 5/27/2023  Impression: Right humeral surgical neck fracture Electronically Signed: Ector Lozada  5/27/2023 12:39 PM EDT  Workstation ID: [id number]      I personally reviewed the x-rays above surgical neck fracture, ER films reviewed    Procedures             Assessment / Plan      Assessment/Plan:   Problem List Items Addressed This Visit          Musculoskeletal and Injuries    Right shoulder pain    Relevant Orders    XR Shoulder 2+ View Right (Completed)    Ambulatory Referral to Physical Therapy Evaluate and treat, Ortho (Completed) "    Closed fracture of right proximal humerus    Relevant Orders    Ambulatory Referral to Physical Therapy Evaluate and treat, Ortho (Completed)    Closed 3-part fracture of proximal end of right humerus - Primary    Relevant Orders    Ambulatory Referral to Physical Therapy Evaluate and treat, Ortho (Completed)       RIGHT    Nonoperative Treatment of Proximal Humerus Fracture    Selective rest/activity modifications recommended to help with recovery.    Sling - I counseled regarding sling care and use and breaks to prevent stiffness. Typically only used for 2-3 weeks after the injury to prevent stiffness.    Activity - nonweight bearing affected shoulder, counseled    Physical therapy prescription provided and program discussed    NSAIDS -known history of upper GI bleed in the past and GERD, no NSAID Rx    Rest - to all time to heal the fracture    Icing -can help with pain and swelling    Follow-up - close follow-up was emphasized as the fracture can sometimes displace over time and it is necessary to follow the fracture closely with xrays.  The patient was instructed to call for a sooner follow-up if any significant changes are noted including worsening of pain.    Pain control - icing can help, rest, sling use    Potential complications discussed:  Post-traumatic stiffness - can occur following the injury and therapy will be necessary to help prevent stiffness  Non-healing or healing with a malunion - nonunion or malunion are possible  Weakness is possible  Displacement of the fracture is possible over time  Surgery is still considered a possibility until the fracture heals properly and the fracture will need to be followed closely  Healing time of the fracture - I counseled that typically 10-12 weeks to heal      Follow Up: 2 weeks, right shoulder 3 views at that time        Marciano Masterson MD, FAAOS  Orthopedic Surgeon  Fellowship Trained Shoulder and Elbow Surgeon  [facility]  Orthopedics and  Sports Medicine  [address], [address]  [place] [address]    06/09/23  11:03 EDT

## 2023-08-29 ENCOUNTER — OFFICE VISIT (OUTPATIENT)
Dept: ORTHOPEDIC SURGERY | Facility: CLINIC | Age: 73
End: 2023-08-29
Payer: MEDICARE

## 2023-08-29 VITALS — WEIGHT: 213 LBS | HEIGHT: 66 IN | BODY MASS INDEX: 34.23 KG/M2

## 2023-08-29 DIAGNOSIS — M25.511 RIGHT SHOULDER PAIN, UNSPECIFIED CHRONICITY: Primary | ICD-10-CM

## 2023-08-29 DIAGNOSIS — S42.291A CLOSED 3-PART FRACTURE OF PROXIMAL END OF RIGHT HUMERUS, INITIAL ENCOUNTER: ICD-10-CM

## 2023-08-29 DIAGNOSIS — Z09 FRACTURE FOLLOW-UP: ICD-10-CM

## 2023-08-29 NOTE — PROGRESS NOTES
[facility] [place] [facility] Office Follow Up       Office Follow Up Visit       Patient Name: Micaela Mc    Chief Complaint:   Chief Complaint   Patient presents with    Follow-up     7 week f/u-  Closed 3-part fracture of proximal end of right humerus DOI: 5/26/23           Referring Physician: No ref. provider found    History of Present Illness:   It has been 7  week(s) since Micaela Mc's last visit. Micaela Mc returns to clinic today for F/U: follow-up of right humerus Reason: fracture. The issue has been ongoing for 3 month(s). Micaela Mc rates HIS/HER: herpain at 1/10 on the pain scale. Previous/current treatments: bracing. Current symptoms:Symptoms: stiffness. The pain is worse with lying on affected side and any movement of the joint; resting, sitting, and pain medication and/or NSAID improves the pain. Overall, he/she: sheis doing better. I have reviewed the patient's history of present illness as noted/entered above.    I have reviewed the patient's past medical history, surgical history, social history, family history, medications, and allergies as noted in the electronic medical record and as noted/entered.  I have reviewed the patient's review of systems as noted/enter and updated as noted in the patient's HPI.      ER visit 5/27/2023  ER note reviewed  Right shoulder proximal humeral fracture     Supportive  present  [facility]     Treated with sling        73 y.o. female  Body mass index is 32.62 kg/mý        7/7/2023:  Right shoulder  6 weeks post injury date of injury is 5/26/2023 --interval healing and fracture consolidation noted    8/29/2023:  DOI 5/26/2023  3 months postop  Fracture healed radiographically        Subjective   Subjective      Review of Systems   Constitutional: Negative.  Negative for chills, fatigue and fever.   HENT: Negative.  Negative for  congestion and dental problem.    Eyes: Negative.  Negative for blurred vision.   Respiratory: Negative.  Negative for shortness of breath.    Cardiovascular: Negative.  Negative for leg swelling.   Gastrointestinal: Negative.  Negative for abdominal pain.   Endocrine: Negative.  Negative for polyuria.   Genitourinary: Negative.  Negative for difficulty urinating.   Musculoskeletal:  Positive for arthralgias.   Skin: Negative.    Allergic/Immunologic: Negative.    Neurological: Negative.    Hematological: Negative.  Negative for adenopathy.   Psychiatric/Behavioral: Negative.  Negative for behavioral problems.       Past Medical History:   Past Medical History:   Diagnosis Date    Anemia     CHF (congestive heart failure)     Coronary artery disease     Diabetes mellitus     GERD (gastroesophageal reflux disease)     Hyperlipidemia     Hypertension        Past Surgical History:   Past Surgical History:   Procedure Laterality Date    APPENDECTOMY      BREAST CYST ASPIRATION  1999    pt doesn't remember which breast    CARDIAC CATHETERIZATION N/A 2/18/2019    Procedure: LEFT HEART CATH;  Surgeon: Martir Cutler MD;  Location:  [facility] CATH INVASIVE LOCATION;  Service: Cardiovascular    ENDOSCOPY N/A 5/15/2017    Procedure: ESOPHAGOGASTRODUODENOSCOPY;  Surgeon: Jennifer Ingram MD;  Location:  [facility];  Service:     HYSTERECTOMY      age 41    LAPAROSCOPIC GASTRIC BANDING         Family History:   Family History   Problem Relation Age of Onset    Breast cancer Neg Hx     Colon cancer Neg Hx     Endometrial cancer Neg Hx     Ovarian cancer Neg Hx        Social History:   Social History     Socioeconomic History    Marital status:    Tobacco Use    Smoking status: Never    Smokeless tobacco: Never   Substance and Sexual Activity    Alcohol use: Not Currently     Comment: very rarely    Drug use: No    Sexual activity: Defer       Medications:   Current Outpatient Medications:     atorvastatin (LIPITOR) 80 MG  tablet, Take 1 tablet by mouth Every Night., Disp: 30 tablet, Rfl: 1    busPIRone (BUSPAR) 5 MG tablet, Take 0.5 tablets by mouth Daily., Disp: , Rfl:     carvedilol (COREG) 25 MG tablet, , Disp: , Rfl:     Cholecalciferol (VITAMIN D3) 50 MCG (2000 UT) capsule, Take 1 capsule by mouth Daily., Disp: , Rfl:     Cyanocobalamin (VITAMIN B-12 PO), Take  by mouth Daily., Disp: , Rfl:     dicyclomine (BENTYL) 20 MG tablet, Take 1 tablet by mouth Every 6 (Six) Hours As Needed (spasm)., Disp: 20 tablet, Rfl: 0    ferrous sulfate 324 (65 FE) MG tablet delayed-release EC tablet, Take 1 tablet by mouth Daily With Breakfast., Disp: , Rfl:     FLUoxetine (PROzac) 40 MG capsule, , Disp: , Rfl:     furosemide (LASIX) 40 MG tablet, Take 1 tablet by mouth Daily As Needed (edema)., Disp: 30 tablet, Rfl: 1    glimepiride (AMARYL) 1 MG tablet, Take 1 tablet by mouth Every Morning Before Breakfast. 1.5 daily, Disp: , Rfl:     hyoscyamine (LEVSIN) 0.125 MG SL tablet, Take 1 tablet by mouth Every 4 (Four) Hours As Needed for Cramping., Disp: , Rfl:     lansoprazole (PREVACID) 30 MG capsule, Take 1 capsule by mouth Daily., Disp: , Rfl:     losartan (COZAAR) 50 MG tablet, Take 1 tablet by mouth Every Night., Disp: 90 tablet, Rfl: 3    metFORMIN (GLUCOPHAGE) 500 MG tablet, Take 1 tablet by mouth 2 (Two) Times a Day With Meals., Disp: , Rfl:     naloxone (NARCAN) 4 MG/0.1ML nasal spray, Call 911. Don't prime. [place] in 1 nostril for overdose. Repeat in 2-3 minutes in other nostril if no or minimal breathing/responsiveness., Disp: 2 each, Rfl: 0    nitroglycerin (NITROSTAT) 0.4 MG SL tablet, Place 1 tablet under the tongue Every 5 (Five) Minutes As Needed for Chest Pain. Take no more than 3 doses in 15 minutes., Disp: 50 tablet, Rfl: 3    potassium chloride (K-DUR,KLOR-CON) 20 MEQ CR tablet, Take 1 tablet by mouth Daily As Needed (edema)., Disp: 90 tablet, Rfl: 1    Allergies:   Allergies   Allergen Reactions    Codeine Itching    Rocephin  "[Ceftriaxone] Itching     Has tolerated amoxicillin in past       The following portions of the patient's history were reviewed and updated as appropriate: allergies, current medications, past family history, past medical history, past social history, past surgical history and problem list.        Objective    Objective      Vital Signs:   Vitals:    08/29/23 0756   Weight: 96.6 kg (213 lb)   Height: 167.6 cm (65.98\")       Ortho Exam:  Right shoulder improvements in active and passive range of motion, smooth arc of motion, excellent rotator cuff strength    Results Review:  Imaging Results (Last 24 Hours)       Procedure Component Value Units Date/Time    XR Shoulder 2+ View Right [[id number]] Resulted: 08/29/23 0813     Updated: 08/29/23 0814    Narrative:      Imaging: shoulder x-rays 3 views - AP, axillary, and scapular-Y x-ray   views    Side: Right shoulder    Indication for shoulder x-ray 3 views: shoulder pain    Comparison: Serial post injury comparison views available    Findings: Right proximal humeral fracture showed initial displacement   following trauma but has maintained stable alignment and subsequent   consolidation.    I personally reviewed the above x-rays.            XR Shoulder 2+ View Right    Result Date: 5/27/2023  Impression: Right humeral surgical neck fracture Electronically Signed: Ector Lozada  5/27/2023 12:39 PM EDT  Workstation ID: [id number]          Procedures            Assessment / Plan      Assessment/Plan:   Problem List Items Addressed This Visit          Musculoskeletal and Injuries    Right shoulder pain - Primary    Relevant Orders    XR Shoulder 2+ View Right (Completed)    Closed 3-part fracture of proximal end of right humerus     Other Visit Diagnoses       Fracture follow-up                Right proximal humeral fracture-heal with nonoperative treatment.  Okay to gradually progress with activities as tolerated    Follow Up: As needed      Marciano Masterson MD, " FAAOS  Orthopedic Surgeon  Fellowship Trained Shoulder and Elbow Surgeon  [facility]  Orthopedics and Sports Medicine  [address], [address]  [place] [address]    08/29/23  08:15 EDT

## 2024-03-18 ENCOUNTER — TRANSCRIBE ORDERS (OUTPATIENT)
Dept: ADMINISTRATIVE | Facility: HOSPITAL | Age: 74
End: 2024-03-18
Payer: MEDICARE

## 2024-03-18 DIAGNOSIS — Z12.31 VISIT FOR SCREENING MAMMOGRAM: Primary | ICD-10-CM

## 2024-04-17 ENCOUNTER — HOSPITAL ENCOUNTER (OUTPATIENT)
Dept: MAMMOGRAPHY | Facility: HOSPITAL | Age: 74
Discharge: HOME | End: 2024-04-17
Admitting: FAMILY MEDICINE
Payer: MEDICARE

## 2024-04-17 DIAGNOSIS — Z12.31 VISIT FOR SCREENING MAMMOGRAM: ICD-10-CM

## 2024-04-17 PROCEDURE — 77067 SCR MAMMO BI INCL CAD: CPT

## 2024-04-17 PROCEDURE — 77063 BREAST TOMOSYNTHESIS BI: CPT

## 2024-06-05 ENCOUNTER — HOSPITAL ENCOUNTER (OUTPATIENT)
Dept: MAMMOGRAPHY | Facility: HOSPITAL | Age: 74
Discharge: HOME | End: 2024-06-05
Admitting: RADIOLOGY
Payer: MEDICARE

## 2024-06-05 DIAGNOSIS — R92.8 ABNORMAL MAMMOGRAM: ICD-10-CM

## 2024-06-05 PROCEDURE — G0279 TOMOSYNTHESIS, MAMMO: HCPCS

## 2024-06-05 PROCEDURE — G0279 TOMOSYNTHESIS, MAMMO: HCPCS | Performed by: RADIOLOGY

## 2024-06-05 PROCEDURE — 77065 DX MAMMO INCL CAD UNI: CPT | Performed by: RADIOLOGY

## 2024-06-05 PROCEDURE — 77065 DX MAMMO INCL CAD UNI: CPT

## 2024-09-24 ENCOUNTER — OFFICE VISIT (OUTPATIENT)
Dept: NEUROLOGY | Facility: CLINIC | Age: 74
End: 2024-09-24
Payer: MEDICARE

## 2024-09-24 VITALS
HEART RATE: 98 BPM | BODY MASS INDEX: 36.75 KG/M2 | OXYGEN SATURATION: 97 % | HEIGHT: 65 IN | WEIGHT: 220.6 LBS | DIASTOLIC BLOOD PRESSURE: 64 MMHG | SYSTOLIC BLOOD PRESSURE: 118 MMHG

## 2024-09-24 DIAGNOSIS — F41.9 ANXIETY: ICD-10-CM

## 2024-09-24 DIAGNOSIS — F03.A3 MILD DEMENTIA WITH MOOD DISTURBANCE, UNSPECIFIED DEMENTIA TYPE: Primary | ICD-10-CM

## 2024-09-24 PROCEDURE — 1160F RVW MEDS BY RX/DR IN RCRD: CPT | Performed by: NURSE PRACTITIONER

## 2024-09-24 PROCEDURE — 3078F DIAST BP <80 MM HG: CPT | Performed by: NURSE PRACTITIONER

## 2024-09-24 PROCEDURE — 3074F SYST BP LT 130 MM HG: CPT | Performed by: NURSE PRACTITIONER

## 2024-09-24 PROCEDURE — 99214 OFFICE O/P EST MOD 30 MIN: CPT | Performed by: NURSE PRACTITIONER

## 2024-09-24 PROCEDURE — 1159F MED LIST DOCD IN RCRD: CPT | Performed by: NURSE PRACTITIONER

## 2024-09-24 RX ORDER — BUSPIRONE HYDROCHLORIDE 5 MG/1
2.5 TABLET ORAL 2 TIMES DAILY
Qty: 60 TABLET | Refills: 5 | Status: SHIPPED | OUTPATIENT
Start: 2024-09-24

## 2024-09-24 RX ORDER — DONEPEZIL HYDROCHLORIDE 5 MG/1
5 TABLET, FILM COATED ORAL NIGHTLY
Qty: 30 TABLET | Refills: 5 | Status: SHIPPED | OUTPATIENT
Start: 2024-09-24 | End: 2025-09-24

## 2025-03-10 ENCOUNTER — TRANSCRIBE ORDERS (OUTPATIENT)
Dept: ADMINISTRATIVE | Facility: HOSPITAL | Age: 75
End: 2025-03-10
Payer: MEDICARE

## 2025-03-10 DIAGNOSIS — Z12.31 VISIT FOR SCREENING MAMMOGRAM: Primary | ICD-10-CM

## 2025-03-24 ENCOUNTER — OFFICE VISIT (OUTPATIENT)
Dept: NEUROLOGY | Facility: CLINIC | Age: 75
End: 2025-03-24
Payer: MEDICARE

## 2025-03-24 VITALS
HEART RATE: 88 BPM | SYSTOLIC BLOOD PRESSURE: 154 MMHG | BODY MASS INDEX: 35.25 KG/M2 | OXYGEN SATURATION: 97 % | DIASTOLIC BLOOD PRESSURE: 96 MMHG | HEIGHT: 65 IN | WEIGHT: 211.6 LBS

## 2025-03-24 DIAGNOSIS — R03.0 ELEVATED BLOOD PRESSURE READING: ICD-10-CM

## 2025-03-24 DIAGNOSIS — F03.A3 MILD DEMENTIA WITH MOOD DISTURBANCE, UNSPECIFIED DEMENTIA TYPE: Primary | ICD-10-CM

## 2025-03-24 DIAGNOSIS — F41.9 ANXIETY: ICD-10-CM

## 2025-03-24 DIAGNOSIS — F03.A3 MILD DEMENTIA WITH MOOD DISTURBANCE, UNSPECIFIED DEMENTIA TYPE: ICD-10-CM

## 2025-03-24 PROCEDURE — 3077F SYST BP >= 140 MM HG: CPT | Performed by: NURSE PRACTITIONER

## 2025-03-24 PROCEDURE — 1159F MED LIST DOCD IN RCRD: CPT | Performed by: NURSE PRACTITIONER

## 2025-03-24 PROCEDURE — 1160F RVW MEDS BY RX/DR IN RCRD: CPT | Performed by: NURSE PRACTITIONER

## 2025-03-24 PROCEDURE — 3080F DIAST BP >= 90 MM HG: CPT | Performed by: NURSE PRACTITIONER

## 2025-03-24 PROCEDURE — 99214 OFFICE O/P EST MOD 30 MIN: CPT | Performed by: NURSE PRACTITIONER

## 2025-03-24 RX ORDER — BUSPIRONE HYDROCHLORIDE 5 MG/1
2.5 TABLET ORAL 2 TIMES DAILY
Qty: 90 TABLET | Refills: 3 | Status: SHIPPED | OUTPATIENT
Start: 2025-03-24

## 2025-03-24 RX ORDER — DONEPEZIL HYDROCHLORIDE 5 MG/1
5 TABLET, FILM COATED ORAL NIGHTLY
Qty: 90 TABLET | Refills: 3 | Status: SHIPPED | OUTPATIENT
Start: 2025-03-24 | End: 2026-03-24

## 2025-03-24 NOTE — PROGRESS NOTES
Neuro Office Visit      Encounter Date: 2025   Patient Name: Micaela Mc  : 1950   MRN: [id number]   PCP: Dr Howard  Chief Complaint:    Chief Complaint   Patient presents with    Dementia       History of Present Illness: Micaela Mc is a 74 y.o. female who is here today in Neurology for  mild dementia and anxiety      Last visit 24 w me-donepezil and buspar  History of Present Illness       Memory Loss  MMSE 21/30 24  Forgetting to take meds.  puts them in pill box. But she stopped taking them again.  The patient presents for evaluation of memory loss. She is accompanied by her .     She reports a decline in her memory, often forgetting appointments or commitments made to others. This issue has been ongoing for approximately 2 years. Her long-term memory appears intact, but she struggles with short-term recall, such as remembering recent orders at a restaurant. She continues to cook, although with some difficulty following recipes. She can operate the TV remote but struggles with using a cell phone. She requires reminders to take her medications. Her  notes a tendency for her to collect unnecessary items and a need for repetition when communicating with her. She is able to watch a full movie without losing focus.     Despite these memory issues, she maintains a good sleep pattern and does not experience nightmares or sleep disturbances. Her appetite remains normal. Her mobility is unaffected, with no reported falls or tremors. She is not experiencing any hallucinations. Her hearing is unimpaired.      Last A1c 10.4     PMH: HTN, HLD, cardiomyopathy, GERD, GI bleed, T2DM  FH:alcoholism  SH:nurse, , -tob, -etoh  Subjective      Past Medical History:   Past Medical History:   Diagnosis Date    Anemia     CHF (congestive heart failure)     Coronary artery disease     Diabetes mellitus     GERD (gastroesophageal reflux disease)      Hyperlipidemia     Hypertension        Past Surgical History:   Past Surgical History:   Procedure Laterality Date    APPENDECTOMY      BREAST CYST ASPIRATION  1999    pt doesn't remember which breast    CARDIAC CATHETERIZATION N/A 02/18/2019    Procedure: LEFT HEART CATH;  Surgeon: Martir Cutler MD;  Location:  [facility];  Service: Cardiovascular    ENDOSCOPY N/A 05/15/2017    Procedure: ESOPHAGOGASTRODUODENOSCOPY;  Surgeon: Jennifer Ingram MD;  Location:  [facility];  Service:     HYSTERECTOMY      age 41    LAPAROSCOPIC GASTRIC BANDING      OOPHORECTOMY         Family History:   Family History   Problem Relation Age of Onset    Breast cancer Neg Hx     Colon cancer Neg Hx     Endometrial cancer Neg Hx     Ovarian cancer Neg Hx        Social History:   Social History     Socioeconomic History    Marital status:    Tobacco Use    Smoking status: Never    Smokeless tobacco: Never   Substance and Sexual Activity    Alcohol use: Not Currently     Comment: very rarely    Drug use: No    Sexual activity: Defer       Medications:     Current Outpatient Medications:     atorvastatin (LIPITOR) 80 MG tablet, Take 1 tablet by mouth Every Night., Disp: 30 tablet, Rfl: 1    busPIRone (BUSPAR) 5 MG tablet, Take 0.5 tablets by mouth 2 (Two) Times a Day., Disp: 90 tablet, Rfl: 3    carvedilol (COREG) 25 MG tablet, , Disp: , Rfl:     Cholecalciferol (VITAMIN D3) 50 MCG (2000 UT) capsule, Take 1 capsule by mouth Daily., Disp: , Rfl:     Cyanocobalamin (VITAMIN B-12 PO), Take  by mouth Daily., Disp: , Rfl:     donepezil (Aricept) 5 MG tablet, Take 1 tablet by mouth Every Night., Disp: 90 tablet, Rfl: 3    ferrous sulfate 324 (65 FE) MG tablet delayed-release EC tablet, Take 1 tablet by mouth Daily With Breakfast., Disp: , Rfl:     FLUoxetine (PROzac) 40 MG capsule, , Disp: , Rfl:     furosemide (LASIX) 40 MG tablet, Take 1 tablet by mouth Daily As Needed (edema)., Disp: 30 tablet, Rfl: 1    losartan  (COZAAR) 50 MG tablet, Take 1 tablet by mouth Every Night., Disp: 90 tablet, Rfl: 3    metFORMIN (GLUCOPHAGE) 500 MG tablet, Take 1 tablet by mouth 2 (Two) Times a Day With Meals., Disp: , Rfl:     nitroglycerin (NITROSTAT) 0.4 MG SL tablet, Place 1 tablet under the tongue Every 5 (Five) Minutes As Needed for Chest Pain. Take no more than 3 doses in 15 minutes., Disp: 50 tablet, Rfl: 3    potassium chloride (K-DUR,KLOR-CON) 20 MEQ CR tablet, Take 1 tablet by mouth Daily As Needed (edema)., Disp: 90 tablet, Rfl: 1    Allergies:   Allergies   Allergen Reactions    Codeine Itching    Rocephin [Ceftriaxone] Itching     Has tolerated amoxicillin in past       PHQ-9 Total Score:     STEADI Fall Risk Assessment was completed, and patient is at LOW risk for falls.Assessment completed on:3/24/2025    Objective     Physical Exam:   Physical Exam  Vitals and nursing note reviewed.   Constitutional:       General: She is not in acute distress.     Appearance: She is well-developed.   HENT:      Head: Normocephalic and atraumatic.      Right Ear: External ear normal.      Left Ear: External ear normal.      Nose: Nose normal.   Eyes:      General: No scleral icterus.        Right eye: No discharge.         Left eye: No discharge.      Conjunctiva/sclera: Conjunctivae normal.   Cardiovascular:      Rate and Rhythm: Normal rate.   Pulmonary:      Effort: Pulmonary effort is normal.   Musculoskeletal:         General: No deformity.      Cervical back: Neck supple.   Skin:     General: Skin is warm and dry.      Findings: No rash.   Neurological:      General: No focal deficit present.      Mental Status: She is alert. Mental status is at baseline.      Cranial Nerves: No cranial nerve deficit.      Sensory: No sensory deficit.      Motor: No weakness or abnormal muscle tone.      Coordination: Coordination normal.      Gait: Gait normal.      Deep Tendon Reflexes: Reflexes normal.   Psychiatric:         Mood and Affect: Mood  "normal.         Behavior: Behavior normal.         Thought Content: Thought content normal.         Judgment: Judgment normal.         Neurological Exam  Mental Status  Alert.    Gait   Normal gait.     Physical Exam        Vital Signs:   Vitals:    03/24/25 1319   BP: 154/96   Pulse: 88   SpO2: 97%   Weight: 96 kg (211 lb 9.6 oz)   Height: 165.1 cm (65\")     Body mass index is 35.21 kg/m².         Assessment / Plan      Assessment/Plan:   Diagnoses and all orders for this visit:    1. Mild dementia with mood disturbance, unspecified dementia type (Primary)  Comments:  cont donepezil 5mg  Orders:  -     donepezil (Aricept) 5 MG tablet; Take 1 tablet by mouth Every Night.  Dispense: 90 tablet; Refill: 3    2. Anxiety  Comments:  Cont buspar  Orders:  -     busPIRone (BUSPAR) 5 MG tablet; Take 0.5 tablets by mouth 2 (Two) Times a Day.  Dispense: 90 tablet; Refill: 3    3. Elevated blood pressure reading  Comments:  Has not been compliant with meds. Monitor bp at home.    4. Mild dementia with mood disturbance, unspecified dementia type  -     donepezil (Aricept) 5 MG tablet; Take 1 tablet by mouth Every Night.  Dispense: 90 tablet; Refill: 3    5. Anxiety  -     busPIRone (BUSPAR) 5 MG tablet; Take 0.5 tablets by mouth 2 (Two) Times a Day.  Dispense: 90 tablet; Refill: 3       Assessment & Plan    Recommend  watch patient take meds twice a day.      Patient Education:       Reviewed medications, potential side effects and signs and symptoms to report. Discussed risk versus benefits of treatment plan with patient and/or family-including medications, labs and radiology that may be ordered. Addressed questions and concerns during visit. Patient and/or family verbalized understanding and agree with plan. Instructed to call the office with any questions and report to ER with any life-threatening symptoms.     Follow Up:   Return in about 6 months (around 9/24/2025) for Recheck.    During this visit the following " were done:  Labs Reviewed [x]    Labs Ordered []    Radiology Reports Reviewed [x]    Radiology Ordered []    PCP Records Reviewed []    Referring Provider Records Reviewed []    ER Records Reviewed []    Hospital Records Reviewed []    History Obtained From Family [x]    Radiology Images Reviewed []    Other Reviewed []    Records Requested []      Patient or patient representative verbalized consent for the use of Ambient Listening during the visit with  Barbara Frost DNP, APRN for chart documentation. 3/24/2025  12:58 EDT      Barbara Frost DNP, APRN

## 2025-03-24 NOTE — LETTER
2025     Katya Howard MD  [address] [facility] [address]  [facility] [address]    Patient: Micaela Mc   YOB: 1950   Date of Visit: 3/24/2025     Dear Katya Howard MD:       Thank you for referring Micaela Mc to me for evaluation. Below are the relevant portions of my assessment and plan of care.    If you have questions, please do not hesitate to call me. I look forward to following Micaela along with you.         Sincerely,        Barbara Frost DNP, APRN        CC: No Recipients    Barbara Frost DNP, APRN  [phone]  Signed     Neuro Office Visit      Encounter Date: 2025   Patient Name: Micaela cM  : 1950   MRN: [id number]   PCP: Dr Howard  Chief Complaint:    Chief Complaint   Patient presents with   • Dementia       History of Present Illness: Micaela Mc is a 74 y.o. female who is here today in Neurology for  mild dementia and anxiety      Last visit 24 w me-donepezil and buspar  History of Present Illness       Memory Loss  MMSE 2124  Forgetting to take meds.  puts them in pill box. But she stopped taking them again.  The patient presents for evaluation of memory loss. She is accompanied by her .     She reports a decline in her memory, often forgetting appointments or commitments made to others. This issue has been ongoing for approximately 2 years. Her long-term memory appears intact, but she struggles with short-term recall, such as remembering recent orders at a restaurant. She continues to cook, although with some difficulty following recipes. She can operate the TV remote but struggles with using a cell phone. She requires reminders to take her medications. Her  notes a tendency for her to collect unnecessary items and a need for repetition when communicating with her. She is able to watch a full movie without losing focus.     Despite these memory issues, she maintains a  good sleep pattern and does not experience nightmares or sleep disturbances. Her appetite remains normal. Her mobility is unaffected, with no reported falls or tremors. She is not experiencing any hallucinations. Her hearing is unimpaired.      Last A1c 10.4     PMH: HTN, HLD, cardiomyopathy, GERD, GI bleed, T2DM  FH:alcoholism  SH:nurse, , -tob, -etoh  Subjective      Past Medical History:   Past Medical History:   Diagnosis Date   • Anemia    • CHF (congestive heart failure)    • Coronary artery disease    • Diabetes mellitus    • GERD (gastroesophageal reflux disease)    • Hyperlipidemia    • Hypertension        Past Surgical History:   Past Surgical History:   Procedure Laterality Date   • APPENDECTOMY     • BREAST CYST ASPIRATION  1999    pt doesn't remember which breast   • CARDIAC CATHETERIZATION N/A 02/18/2019    Procedure: LEFT HEART CATH;  Surgeon: Martir Cutler MD;  Location:  [facility];  Service: Cardiovascular   • ENDOSCOPY N/A 05/15/2017    Procedure: ESOPHAGOGASTRODUODENOSCOPY;  Surgeon: Jennifer Ingram MD;  Location:  [facility];  Service:    • HYSTERECTOMY      age 41   • LAPAROSCOPIC GASTRIC BANDING     • OOPHORECTOMY         Family History:   Family History   Problem Relation Age of Onset   • Breast cancer Neg Hx    • Colon cancer Neg Hx    • Endometrial cancer Neg Hx    • Ovarian cancer Neg Hx        Social History:   Social History     Socioeconomic History   • Marital status:    Tobacco Use   • Smoking status: Never   • Smokeless tobacco: Never   Substance and Sexual Activity   • Alcohol use: Not Currently     Comment: very rarely   • Drug use: No   • Sexual activity: Defer       Medications:     Current Outpatient Medications:   •  atorvastatin (LIPITOR) 80 MG tablet, Take 1 tablet by mouth Every Night., Disp: 30 tablet, Rfl: 1  •  busPIRone (BUSPAR) 5 MG tablet, Take 0.5 tablets by mouth 2 (Two) Times a Day., Disp: 90 tablet, Rfl: 3  •  carvedilol  (COREG) 25 MG tablet, , Disp: , Rfl:   •  Cholecalciferol (VITAMIN D3) 50 MCG (2000 UT) capsule, Take 1 capsule by mouth Daily., Disp: , Rfl:   •  Cyanocobalamin (VITAMIN B-12 PO), Take  by mouth Daily., Disp: , Rfl:   •  donepezil (Aricept) 5 MG tablet, Take 1 tablet by mouth Every Night., Disp: 90 tablet, Rfl: 3  •  ferrous sulfate 324 (65 FE) MG tablet delayed-release EC tablet, Take 1 tablet by mouth Daily With Breakfast., Disp: , Rfl:   •  FLUoxetine (PROzac) 40 MG capsule, , Disp: , Rfl:   •  furosemide (LASIX) 40 MG tablet, Take 1 tablet by mouth Daily As Needed (edema)., Disp: 30 tablet, Rfl: 1  •  losartan (COZAAR) 50 MG tablet, Take 1 tablet by mouth Every Night., Disp: 90 tablet, Rfl: 3  •  metFORMIN (GLUCOPHAGE) 500 MG tablet, Take 1 tablet by mouth 2 (Two) Times a Day With Meals., Disp: , Rfl:   •  nitroglycerin (NITROSTAT) 0.4 MG SL tablet, Place 1 tablet under the tongue Every 5 (Five) Minutes As Needed for Chest Pain. Take no more than 3 doses in 15 minutes., Disp: 50 tablet, Rfl: 3  •  potassium chloride (K-DUR,KLOR-CON) 20 MEQ CR tablet, Take 1 tablet by mouth Daily As Needed (edema)., Disp: 90 tablet, Rfl: 1    Allergies:   Allergies   Allergen Reactions   • Codeine Itching   • Rocephin [Ceftriaxone] Itching     Has tolerated amoxicillin in past       PHQ-9 Total Score:     STEADI Fall Risk Assessment was completed, and patient is at LOW risk for falls.Assessment completed on:3/24/2025    Objective     Physical Exam:   Physical Exam  Vitals and nursing note reviewed.   Constitutional:       General: She is not in acute distress.     Appearance: She is well-developed.   HENT:      Head: Normocephalic and atraumatic.      Right Ear: External ear normal.      Left Ear: External ear normal.      Nose: Nose normal.   Eyes:      General: No scleral icterus.        Right eye: No discharge.         Left eye: No discharge.      Conjunctiva/sclera: Conjunctivae normal.   Cardiovascular:      Rate and  "Rhythm: Normal rate.   Pulmonary:      Effort: Pulmonary effort is normal.   Musculoskeletal:         General: No deformity.      Cervical back: Neck supple.   Skin:     General: Skin is warm and dry.      Findings: No rash.   Neurological:      General: No focal deficit present.      Mental Status: She is alert. Mental status is at baseline.      Cranial Nerves: No cranial nerve deficit.      Sensory: No sensory deficit.      Motor: No weakness or abnormal muscle tone.      Coordination: Coordination normal.      Gait: Gait normal.      Deep Tendon Reflexes: Reflexes normal.   Psychiatric:         Mood and Affect: Mood normal.         Behavior: Behavior normal.         Thought Content: Thought content normal.         Judgment: Judgment normal.         Neurological Exam  Mental Status  Alert.    Gait   Normal gait.     Physical Exam        Vital Signs:   Vitals:    03/24/25 1319   BP: 154/96   Pulse: 88   SpO2: 97%   Weight: 96 kg (211 lb 9.6 oz)   Height: 165.1 cm (65\")     Body mass index is 35.21 kg/m².         Assessment / Plan      Assessment/Plan:   Diagnoses and all orders for this visit:    1. Mild dementia with mood disturbance, unspecified dementia type (Primary)  Comments:  cont donepezil 5mg  Orders:  -     donepezil (Aricept) 5 MG tablet; Take 1 tablet by mouth Every Night.  Dispense: 90 tablet; Refill: 3    2. Anxiety  Comments:  Cont buspar  Orders:  -     busPIRone (BUSPAR) 5 MG tablet; Take 0.5 tablets by mouth 2 (Two) Times a Day.  Dispense: 90 tablet; Refill: 3    3. Elevated blood pressure reading  Comments:  Has not been compliant with meds. Monitor bp at home.    4. Mild dementia with mood disturbance, unspecified dementia type  -     donepezil (Aricept) 5 MG tablet; Take 1 tablet by mouth Every Night.  Dispense: 90 tablet; Refill: 3    5. Anxiety  -     busPIRone (BUSPAR) 5 MG tablet; Take 0.5 tablets by mouth 2 (Two) Times a Day.  Dispense: 90 tablet; Refill: 3       Assessment & " Plan    Recommend  watch patient take meds twice a day.      Patient Education:       Reviewed medications, potential side effects and signs and symptoms to report. Discussed risk versus benefits of treatment plan with patient and/or family-including medications, labs and radiology that may be ordered. Addressed questions and concerns during visit. Patient and/or family verbalized understanding and agree with plan. Instructed to call the office with any questions and report to ER with any life-threatening symptoms.     Follow Up:   Return in about 6 months (around 9/24/2025) for Recheck.    During this visit the following were done:  Labs Reviewed [x]    Labs Ordered []    Radiology Reports Reviewed [x]    Radiology Ordered []    PCP Records Reviewed []    Referring Provider Records Reviewed []    ER Records Reviewed []    Hospital Records Reviewed []    History Obtained From Family [x]    Radiology Images Reviewed []    Other Reviewed []    Records Requested []      Patient or patient representative verbalized consent for the use of Ambient Listening during the visit with  Barbara Frost DNP, APRN for chart documentation. 3/24/2025  12:58 EDT      Barbara Frost DNP, APRN

## 2025-04-18 ENCOUNTER — HOSPITAL ENCOUNTER (OUTPATIENT)
Dept: MAMMOGRAPHY | Facility: HOSPITAL | Age: 75
Discharge: HOME | End: 2025-04-18
Admitting: FAMILY MEDICINE
Payer: MEDICARE

## 2025-04-18 DIAGNOSIS — Z12.31 VISIT FOR SCREENING MAMMOGRAM: ICD-10-CM

## 2025-04-18 PROCEDURE — 77063 BREAST TOMOSYNTHESIS BI: CPT

## 2025-04-18 PROCEDURE — 77067 SCR MAMMO BI INCL CAD: CPT

## (undated) DEVICE — "MH-948 A/W CHANNEL CLEANING ADPTR -VIDEO": Brand: AW CHANNEL CLEANING ADAPTE

## (undated) DEVICE — Device

## (undated) DEVICE — MODEL AT P65, P/N 701554-001KIT CONTENTS: HAND CONTROLLER, 3-WAY HIGH-PRESSURE STOPCOCK WITH ROTATING END AND PREMIUM HIGH-PRESSURE TUBING: Brand: ANGIOTOUCH® KIT

## (undated) DEVICE — SYR LUERLOK 50ML

## (undated) DEVICE — INTRO SHEATH PRELUDE EASE HC .025 6F 11CM

## (undated) DEVICE — Device: Brand: ENDOGATOR

## (undated) DEVICE — "MH-443 SUCTION VALVE F/EVIS140 EVIS160": Brand: SUCTION VALVE

## (undated) DEVICE — CANN NASL CO2 DIVIDED A/

## (undated) DEVICE — INTRO ACCSR BLNT TP

## (undated) DEVICE — CATH DIAG EXPO .056 FL3.5 6F 100CM

## (undated) DEVICE — PK CATH CARD 10

## (undated) DEVICE — CATH DIAG EXPO M/ PK 6FR FL4/FR4 PIG 3PK

## (undated) DEVICE — THE BITE BLOCK MAXI, LATEX FREE STRAP IS USED TO PROTECT THE ENDOSCOPE INSERTION TUBE FROM BEING BITTEN BY THE PATIENT.

## (undated) DEVICE — MODEL BT2000 P/N 700287-012KIT CONTENTS: MANIFOLD WITH SALINE AND CONTRAST PORTS, SALINE TUBING WITH SPIKE AND HAND SYRINGE, TRANSDUCER: Brand: BT2000 AUTOMATED MANIFOLD KIT

## (undated) DEVICE — DEV COMP RAD PRELUDESYNC 24CM

## (undated) DEVICE — TBG 02 CRUSH RESIST LF CLR 7FT

## (undated) DEVICE — "MH-438 A/W VLVE F/140 EVIS-140": Brand: AIR/WATER VALVE

## (undated) DEVICE — ENDOGATOR HYBRID TUBING KIT FOR USE WITH ENDOGATOR IRRIGATION PUMP, OLYMPUS PUMP, GI4000 ESU, AND TORRENT IRRIGATION PUMP.: Brand: ENDOGATOR KIT

## (undated) DEVICE — CONTN GRAD MEAS TRIANG 32OZ BLK